# Patient Record
Sex: FEMALE | Race: WHITE | Employment: OTHER | URBAN - METROPOLITAN AREA
[De-identification: names, ages, dates, MRNs, and addresses within clinical notes are randomized per-mention and may not be internally consistent; named-entity substitution may affect disease eponyms.]

---

## 2017-01-16 DIAGNOSIS — E78.00 PURE HYPERCHOLESTEROLEMIA: ICD-10-CM

## 2017-01-16 DIAGNOSIS — K58.0 IRRITABLE BOWEL SYNDROME WITH DIARRHEA: ICD-10-CM

## 2017-01-16 DIAGNOSIS — E03.9 ACQUIRED HYPOTHYROIDISM: ICD-10-CM

## 2017-01-16 LAB
A/G RATIO: 2 (ref 1.1–2.2)
ALBUMIN SERPL-MCNC: 3.9 G/DL (ref 3.4–5)
ALP BLD-CCNC: 48 U/L (ref 40–129)
ALT SERPL-CCNC: 9 U/L (ref 10–40)
ANION GAP SERPL CALCULATED.3IONS-SCNC: 12 MMOL/L (ref 3–16)
AST SERPL-CCNC: 16 U/L (ref 15–37)
BASOPHILS ABSOLUTE: 0.1 K/UL (ref 0–0.2)
BASOPHILS RELATIVE PERCENT: 1 %
BILIRUB SERPL-MCNC: 0.4 MG/DL (ref 0–1)
BUN BLDV-MCNC: 16 MG/DL (ref 7–20)
CALCIUM SERPL-MCNC: 9.9 MG/DL (ref 8.3–10.6)
CHLORIDE BLD-SCNC: 101 MMOL/L (ref 99–110)
CHOLESTEROL, TOTAL: 206 MG/DL (ref 0–199)
CO2: 28 MMOL/L (ref 21–32)
CREAT SERPL-MCNC: 1.1 MG/DL (ref 0.6–1.2)
EOSINOPHILS ABSOLUTE: 0.2 K/UL (ref 0–0.6)
EOSINOPHILS RELATIVE PERCENT: 4.5 %
GFR AFRICAN AMERICAN: 59
GFR NON-AFRICAN AMERICAN: 48
GLOBULIN: 2 G/DL
GLUCOSE BLD-MCNC: 90 MG/DL (ref 70–99)
HCT VFR BLD CALC: 40.3 % (ref 36–48)
HDLC SERPL-MCNC: 72 MG/DL (ref 40–60)
HEMOGLOBIN: 13.3 G/DL (ref 12–16)
LDL CHOLESTEROL CALCULATED: 117 MG/DL
LYMPHOCYTES ABSOLUTE: 1.9 K/UL (ref 1–5.1)
LYMPHOCYTES RELATIVE PERCENT: 36.2 %
MCH RBC QN AUTO: 28.5 PG (ref 26–34)
MCHC RBC AUTO-ENTMCNC: 33 G/DL (ref 31–36)
MCV RBC AUTO: 86.4 FL (ref 80–100)
MONOCYTES ABSOLUTE: 0.7 K/UL (ref 0–1.3)
MONOCYTES RELATIVE PERCENT: 13.6 %
NEUTROPHILS ABSOLUTE: 2.4 K/UL (ref 1.7–7.7)
NEUTROPHILS RELATIVE PERCENT: 44.7 %
PDW BLD-RTO: 13.9 % (ref 12.4–15.4)
PLATELET # BLD: 243 K/UL (ref 135–450)
PMV BLD AUTO: 10 FL (ref 5–10.5)
POTASSIUM SERPL-SCNC: 5 MMOL/L (ref 3.5–5.1)
RBC # BLD: 4.67 M/UL (ref 4–5.2)
SODIUM BLD-SCNC: 141 MMOL/L (ref 136–145)
TOTAL PROTEIN: 5.9 G/DL (ref 6.4–8.2)
TRIGL SERPL-MCNC: 85 MG/DL (ref 0–150)
TSH REFLEX: 2.32 UIU/ML (ref 0.27–4.2)
VLDLC SERPL CALC-MCNC: 17 MG/DL
WBC # BLD: 5.4 K/UL (ref 4–11)

## 2017-01-24 ENCOUNTER — OFFICE VISIT (OUTPATIENT)
Dept: FAMILY MEDICINE CLINIC | Age: 77
End: 2017-01-24

## 2017-01-24 VITALS
SYSTOLIC BLOOD PRESSURE: 104 MMHG | OXYGEN SATURATION: 97 % | HEART RATE: 68 BPM | TEMPERATURE: 98.7 F | DIASTOLIC BLOOD PRESSURE: 70 MMHG | BODY MASS INDEX: 27.19 KG/M2 | RESPIRATION RATE: 18 BRPM | WEIGHT: 163.4 LBS

## 2017-01-24 DIAGNOSIS — N28.9 RENAL INSUFFICIENCY, MILD: ICD-10-CM

## 2017-01-24 DIAGNOSIS — F41.1 ANXIETY STATE: Primary | ICD-10-CM

## 2017-01-24 DIAGNOSIS — E03.9 ACQUIRED HYPOTHYROIDISM: ICD-10-CM

## 2017-01-24 DIAGNOSIS — E78.00 PURE HYPERCHOLESTEROLEMIA: ICD-10-CM

## 2017-01-24 PROCEDURE — 99214 OFFICE O/P EST MOD 30 MIN: CPT | Performed by: FAMILY MEDICINE

## 2017-01-24 RX ORDER — ESCITALOPRAM OXALATE 10 MG/1
10 TABLET ORAL DAILY
Qty: 90 TABLET | Refills: 1 | Status: SHIPPED | OUTPATIENT
Start: 2017-01-24 | End: 2017-05-22 | Stop reason: SDUPTHER

## 2017-02-24 ENCOUNTER — OFFICE VISIT (OUTPATIENT)
Dept: FAMILY MEDICINE CLINIC | Age: 77
End: 2017-02-24

## 2017-02-24 VITALS
TEMPERATURE: 97.3 F | OXYGEN SATURATION: 100 % | SYSTOLIC BLOOD PRESSURE: 120 MMHG | HEART RATE: 84 BPM | BODY MASS INDEX: 27.06 KG/M2 | DIASTOLIC BLOOD PRESSURE: 65 MMHG | WEIGHT: 162.6 LBS

## 2017-02-24 DIAGNOSIS — E78.00 PURE HYPERCHOLESTEROLEMIA: Primary | ICD-10-CM

## 2017-02-24 DIAGNOSIS — N28.9 RENAL INSUFFICIENCY, MILD: ICD-10-CM

## 2017-02-24 DIAGNOSIS — Z12.11 COLON CANCER SCREENING: ICD-10-CM

## 2017-02-24 DIAGNOSIS — F41.1 ANXIETY STATE: ICD-10-CM

## 2017-02-24 PROCEDURE — 99214 OFFICE O/P EST MOD 30 MIN: CPT | Performed by: FAMILY MEDICINE

## 2017-02-24 RX ORDER — FENOFIBRATE 134 MG/1
CAPSULE ORAL
Qty: 90 CAPSULE | Refills: 1
Start: 2017-02-24 | End: 2017-05-22 | Stop reason: SDUPTHER

## 2017-03-28 ENCOUNTER — TELEPHONE (OUTPATIENT)
Dept: FAMILY MEDICINE CLINIC | Age: 77
End: 2017-03-28

## 2017-04-21 ENCOUNTER — TELEPHONE (OUTPATIENT)
Dept: FAMILY MEDICINE CLINIC | Age: 77
End: 2017-04-21

## 2017-04-21 DIAGNOSIS — E78.00 PURE HYPERCHOLESTEROLEMIA: Primary | ICD-10-CM

## 2017-04-25 DIAGNOSIS — E78.00 PURE HYPERCHOLESTEROLEMIA: ICD-10-CM

## 2017-04-25 LAB
A/G RATIO: 2.3 (ref 1.1–2.2)
ALBUMIN SERPL-MCNC: 4.1 G/DL (ref 3.4–5)
ALP BLD-CCNC: 55 U/L (ref 40–129)
ALT SERPL-CCNC: 12 U/L (ref 10–40)
ANION GAP SERPL CALCULATED.3IONS-SCNC: 14 MMOL/L (ref 3–16)
AST SERPL-CCNC: 18 U/L (ref 15–37)
BILIRUB SERPL-MCNC: <0.2 MG/DL (ref 0–1)
BUN BLDV-MCNC: 17 MG/DL (ref 7–20)
CALCIUM SERPL-MCNC: 9.8 MG/DL (ref 8.3–10.6)
CHLORIDE BLD-SCNC: 103 MMOL/L (ref 99–110)
CHOLESTEROL, TOTAL: 205 MG/DL (ref 0–199)
CO2: 27 MMOL/L (ref 21–32)
CREAT SERPL-MCNC: 1.1 MG/DL (ref 0.6–1.2)
GFR AFRICAN AMERICAN: 59
GFR NON-AFRICAN AMERICAN: 48
GLOBULIN: 1.8 G/DL
GLUCOSE BLD-MCNC: 85 MG/DL (ref 70–99)
HDLC SERPL-MCNC: 75 MG/DL (ref 40–60)
LDL CHOLESTEROL CALCULATED: 117 MG/DL
POTASSIUM SERPL-SCNC: 5.2 MMOL/L (ref 3.5–5.1)
SODIUM BLD-SCNC: 144 MMOL/L (ref 136–145)
TOTAL PROTEIN: 5.9 G/DL (ref 6.4–8.2)
TRIGL SERPL-MCNC: 67 MG/DL (ref 0–150)
TSH REFLEX: 2.01 UIU/ML (ref 0.27–4.2)
VLDLC SERPL CALC-MCNC: 13 MG/DL

## 2017-04-28 DIAGNOSIS — F41.1 ANXIETY STATE: ICD-10-CM

## 2017-04-28 RX ORDER — LORAZEPAM 1 MG/1
TABLET ORAL
Qty: 90 TABLET | Refills: 0 | Status: SHIPPED | OUTPATIENT
Start: 2017-04-28 | End: 2017-05-22 | Stop reason: SDUPTHER

## 2017-05-19 DIAGNOSIS — F41.1 ANXIETY STATE: ICD-10-CM

## 2017-05-22 DIAGNOSIS — F41.1 ANXIETY STATE: ICD-10-CM

## 2017-05-22 RX ORDER — LORAZEPAM 1 MG/1
TABLET ORAL
Qty: 90 TABLET | Refills: 0 | Status: CANCELLED | OUTPATIENT
Start: 2017-05-22

## 2017-05-22 RX ORDER — ESCITALOPRAM OXALATE 10 MG/1
10 TABLET ORAL DAILY
Qty: 90 TABLET | Refills: 1 | Status: SHIPPED | OUTPATIENT
Start: 2017-05-22 | End: 2017-10-31 | Stop reason: SDUPTHER

## 2017-05-22 RX ORDER — FENOFIBRATE 134 MG/1
CAPSULE ORAL
Qty: 90 CAPSULE | Refills: 1 | Status: SHIPPED | OUTPATIENT
Start: 2017-05-22 | End: 2017-06-13

## 2017-05-22 RX ORDER — LORAZEPAM 1 MG/1
TABLET ORAL
Qty: 90 TABLET | Refills: 0 | Status: SHIPPED | OUTPATIENT
Start: 2017-05-22 | End: 2017-06-13 | Stop reason: SDUPTHER

## 2017-06-13 ENCOUNTER — OFFICE VISIT (OUTPATIENT)
Dept: FAMILY MEDICINE CLINIC | Age: 77
End: 2017-06-13

## 2017-06-13 VITALS
RESPIRATION RATE: 12 BRPM | OXYGEN SATURATION: 100 % | HEART RATE: 75 BPM | BODY MASS INDEX: 26.83 KG/M2 | SYSTOLIC BLOOD PRESSURE: 129 MMHG | WEIGHT: 161.2 LBS | TEMPERATURE: 97.1 F | DIASTOLIC BLOOD PRESSURE: 70 MMHG

## 2017-06-13 DIAGNOSIS — R35.0 URINARY FREQUENCY: ICD-10-CM

## 2017-06-13 DIAGNOSIS — F41.1 ANXIETY STATE: ICD-10-CM

## 2017-06-13 DIAGNOSIS — N20.0 KIDNEY STONE: ICD-10-CM

## 2017-06-13 DIAGNOSIS — E78.00 PURE HYPERCHOLESTEROLEMIA: Primary | ICD-10-CM

## 2017-06-13 LAB
BILIRUBIN, POC: NORMAL
BLOOD URINE, POC: NORMAL
CLARITY, POC: NORMAL
COLOR, POC: YELLOW
GLUCOSE URINE, POC: NORMAL
KETONES, POC: NORMAL
LEUKOCYTE EST, POC: NORMAL
NITRITE, POC: NORMAL
PH, POC: 8.5
PROTEIN, POC: NORMAL
SPECIFIC GRAVITY, POC: 1
UROBILINOGEN, POC: 0.2

## 2017-06-13 PROCEDURE — 99214 OFFICE O/P EST MOD 30 MIN: CPT | Performed by: FAMILY MEDICINE

## 2017-06-13 PROCEDURE — 81002 URINALYSIS NONAUTO W/O SCOPE: CPT | Performed by: FAMILY MEDICINE

## 2017-06-13 RX ORDER — LORAZEPAM 1 MG/1
TABLET ORAL
Qty: 90 TABLET | Refills: 5 | Status: SHIPPED | OUTPATIENT
Start: 2017-06-13 | End: 2017-10-31 | Stop reason: SDUPTHER

## 2017-06-13 RX ORDER — SIMVASTATIN 20 MG
20 TABLET ORAL EVERY EVENING
Qty: 30 TABLET | Refills: 3 | Status: SHIPPED | OUTPATIENT
Start: 2017-06-13 | End: 2017-07-21

## 2017-06-14 ENCOUNTER — TELEPHONE (OUTPATIENT)
Dept: FAMILY MEDICINE CLINIC | Age: 77
End: 2017-06-14

## 2017-06-15 LAB — URINE CULTURE, ROUTINE: NORMAL

## 2017-06-22 ENCOUNTER — TELEPHONE (OUTPATIENT)
Dept: FAMILY MEDICINE CLINIC | Age: 77
End: 2017-06-22

## 2017-06-26 RX ORDER — OMEPRAZOLE 20 MG/1
CAPSULE, DELAYED RELEASE ORAL
Qty: 90 CAPSULE | Refills: 3 | Status: SHIPPED | OUTPATIENT
Start: 2017-06-26 | End: 2018-08-30 | Stop reason: SDUPTHER

## 2017-07-16 ENCOUNTER — TELEPHONE (OUTPATIENT)
Dept: FAMILY MEDICINE CLINIC | Age: 77
End: 2017-07-16

## 2017-07-21 ENCOUNTER — OFFICE VISIT (OUTPATIENT)
Dept: FAMILY MEDICINE CLINIC | Age: 77
End: 2017-07-21

## 2017-07-21 VITALS
WEIGHT: 161.2 LBS | TEMPERATURE: 97.5 F | RESPIRATION RATE: 16 BRPM | BODY MASS INDEX: 26.83 KG/M2 | HEART RATE: 78 BPM | SYSTOLIC BLOOD PRESSURE: 136 MMHG | DIASTOLIC BLOOD PRESSURE: 75 MMHG

## 2017-07-21 DIAGNOSIS — E78.00 PURE HYPERCHOLESTEROLEMIA: ICD-10-CM

## 2017-07-21 DIAGNOSIS — R91.8 MASS OF LOWER LOBE OF RIGHT LUNG: ICD-10-CM

## 2017-07-21 DIAGNOSIS — R10.9 RIGHT FLANK PAIN: Primary | ICD-10-CM

## 2017-07-21 DIAGNOSIS — R07.89 CHEST WALL PAIN: ICD-10-CM

## 2017-07-21 LAB
BILIRUBIN, POC: NEGATIVE
BLOOD URINE, POC: ABNORMAL
CLARITY, POC: CLEAR
COLOR, POC: YELLOW
GLUCOSE URINE, POC: NEGATIVE
KETONES, POC: NEGATIVE
LEUKOCYTE EST, POC: ABNORMAL
NITRITE, POC: NEGATIVE
PH, POC: 7.5
PROTEIN, POC: NEGATIVE
SPECIFIC GRAVITY, POC: 1
UROBILINOGEN, POC: 0.2

## 2017-07-21 PROCEDURE — 81002 URINALYSIS NONAUTO W/O SCOPE: CPT | Performed by: FAMILY MEDICINE

## 2017-07-21 PROCEDURE — 99214 OFFICE O/P EST MOD 30 MIN: CPT | Performed by: FAMILY MEDICINE

## 2017-07-21 RX ORDER — FENOFIBRATE 134 MG/1
134 CAPSULE ORAL
Qty: 30 CAPSULE | Refills: 5 | Status: SHIPPED | OUTPATIENT
Start: 2017-07-21 | End: 2018-01-16 | Stop reason: SDUPTHER

## 2017-07-23 LAB — URINE CULTURE, ROUTINE: NORMAL

## 2017-07-31 ENCOUNTER — TELEPHONE (OUTPATIENT)
Dept: FAMILY MEDICINE CLINIC | Age: 77
End: 2017-07-31

## 2017-08-01 ENCOUNTER — TELEPHONE (OUTPATIENT)
Dept: FAMILY MEDICINE CLINIC | Age: 77
End: 2017-08-01

## 2017-08-02 ENCOUNTER — TELEPHONE (OUTPATIENT)
Dept: FAMILY MEDICINE CLINIC | Age: 77
End: 2017-08-02

## 2017-08-07 ENCOUNTER — OFFICE VISIT (OUTPATIENT)
Dept: FAMILY MEDICINE CLINIC | Age: 77
End: 2017-08-07

## 2017-08-07 VITALS
HEART RATE: 78 BPM | OXYGEN SATURATION: 96 % | DIASTOLIC BLOOD PRESSURE: 61 MMHG | HEIGHT: 63 IN | SYSTOLIC BLOOD PRESSURE: 120 MMHG | RESPIRATION RATE: 16 BRPM | WEIGHT: 157.6 LBS | BODY MASS INDEX: 27.93 KG/M2

## 2017-08-07 DIAGNOSIS — R91.8 LUNG NODULES: Primary | ICD-10-CM

## 2017-08-07 PROCEDURE — 99213 OFFICE O/P EST LOW 20 MIN: CPT | Performed by: FAMILY MEDICINE

## 2017-09-20 RX ORDER — PROMETHAZINE HYDROCHLORIDE 12.5 MG/1
TABLET ORAL
Qty: 30 TABLET | Refills: 0 | Status: SHIPPED | OUTPATIENT
Start: 2017-09-20 | End: 2018-04-30 | Stop reason: SDUPTHER

## 2017-10-12 ENCOUNTER — TELEPHONE (OUTPATIENT)
Dept: FAMILY MEDICINE CLINIC | Age: 77
End: 2017-10-12

## 2017-10-12 ENCOUNTER — OFFICE VISIT (OUTPATIENT)
Dept: FAMILY MEDICINE CLINIC | Age: 77
End: 2017-10-12

## 2017-10-12 VITALS
DIASTOLIC BLOOD PRESSURE: 78 MMHG | WEIGHT: 158 LBS | SYSTOLIC BLOOD PRESSURE: 134 MMHG | BODY MASS INDEX: 27.65 KG/M2 | OXYGEN SATURATION: 100 % | HEART RATE: 70 BPM

## 2017-10-12 DIAGNOSIS — E03.9 ACQUIRED HYPOTHYROIDISM: ICD-10-CM

## 2017-10-12 DIAGNOSIS — R30.0 DYSURIA: ICD-10-CM

## 2017-10-12 DIAGNOSIS — R42 VERTIGO: Primary | ICD-10-CM

## 2017-10-12 PROCEDURE — 99214 OFFICE O/P EST MOD 30 MIN: CPT | Performed by: FAMILY MEDICINE

## 2017-10-12 RX ORDER — LEVOTHYROXINE SODIUM 0.07 MG/1
TABLET ORAL
Qty: 90 TABLET | Refills: 0 | Status: CANCELLED | OUTPATIENT
Start: 2017-10-12

## 2017-10-12 ASSESSMENT — PATIENT HEALTH QUESTIONNAIRE - PHQ9
1. LITTLE INTEREST OR PLEASURE IN DOING THINGS: 0
SUM OF ALL RESPONSES TO PHQ9 QUESTIONS 1 & 2: 1
2. FEELING DOWN, DEPRESSED OR HOPELESS: 1
SUM OF ALL RESPONSES TO PHQ QUESTIONS 1-9: 1

## 2017-10-12 NOTE — PROGRESS NOTES
Subjective:      Patient ID: Urszula Mac is a 78 y.o. female. HPI  Malina Durand is here for evaluation for dizziness. She complains of vertigo x 2 days. Occurs when lies down or gets up from lying down. Lasts a few seconds. Does not occur while up. No nausea, hearing loss or tinnitus. Has mild nasal congestion, sneezing and itchy eyes. FLonase and Zaditor are effective for this. She saw Dr. Willow Moss for her kidney stone. It is not obstructive. He wants to see her in a hear. She notes mild suprapubic pain on - this is chronic and unchanged. Feels a mild ache in the pelvis that lasts just a few seconds. Has had urine cx for this several times that were negative. Outpatient Prescriptions Marked as Taking for the 10/12/17 encounter (Office Visit) with Rosenda Bartlett MD   Medication Sig Dispense Refill    promethazine (PHENERGAN) 12.5 MG tablet TAKE 1 TABLET BY MOUTH EVERY 6 HOURS AS NEEDED FOR NAUSEA 30 tablet 0    levothyroxine (SYNTHROID) 75 MCG tablet TAKE 1 TABLET BY MOUTH EVERY DAY 90 tablet 0    fenofibrate micronized (LOFIBRA) 134 MG capsule Take 1 capsule by mouth every morning (before breakfast) 30 capsule 5    LORazepam (ATIVAN) 1 MG tablet TAKE 1 TABLET BY MOUTH 3 TIMES DAILY 90 tablet 5    escitalopram (LEXAPRO) 10 MG tablet Take 1 tablet by mouth daily TAKE 1 TABLET BY MOUTH DAILY 90 tablet 1    BIOTIN 5000 PO Take  by mouth.  vitamin D (CHOLECALCIFEROL) 1000 UNIT TABS tablet Take 1,000 Units by mouth 3 times daily.  Coenzyme Q10-Fish Oil-Vit E (CO-Q 10 OMEGA-3 FISH OIL) CAPS Take 1 capsule by mouth daily.  Magnesium 100 MG CAPS Take 1 tablet by mouth daily.  B Complex Vitamins (VITAMIN B COMPLEX PO) Take  by mouth.  Capsicum, Cayenne, (CAYENNE PEPPER PO) Take  by mouth.  Nutritional Supplements (JUICE PLUS FIBRE PO) Take 1 tablet by mouth daily.  Calcium & Magnesium Carbonates 311-232 MG CAPS Take 1 tablet by mouth 2 times daily.       Cranberry 500 MG CAPS Take 2 tablets by mouth daily. PMH, PSH, SH, Problem list reviewed. Social History   Substance Use Topics    Smoking status: Never Smoker    Smokeless tobacco: Never Used    Alcohol use No      Allergies   Allergen Reactions    Biaxin [Clarithromycin]     Erythromycin     Lescol     Lipitor     Macrodantin [Nitrofurantoin]     Paxil [Paroxetine]     Zoloft [Sertraline Hcl] Nausea Only and Other (See Comments)     C/o nausea and headaches     Bactrim [Sulfamethoxazole-Trimethoprim] Rash    Biaxin [Clarithromycin] Nausea Only    Ciprofloxacin Rash     Pain under arms.  Erythromycin Nausea Only      Review of Systems    Objective:   Physical Exam  Mood and affect are anxious. Skin is warm and dry. Well hydrated, no rash. PERRLA EOMI  CN intact.   + hallpike left. Ear exam - bilateral normal, TM intact without perforation or effusion, external canal normal. No significant ceruminosis noted. Nasal exam; septum midline, no deformities, nares patent, normal mucosa without swelling, no polyps, no bleeding. Pharynx normal, no oral lesions, dentition in good repair. No cervical, supraclavicular or submandibular LNS. Chest is clear, no wheezing or rales. Normal symmetric air entry throughout both lung fields. Heart regular with normal rate, no murmer or gallop  The abdomen is soft without tenderness, guarding, mass, rebound or organomegaly. Bowel sounds are normal. No CVA tenderness or inguinal adenopathy noted. Gait is normal  Assessment:      1. Vertigo  External Referral To Physical Therapy  Advised caution to prevent falls. Avoid driving with vertigo   2. Acquired hypothyroidism  levothyroxine refill   3. Dysuria  POCT Urinalysis no Micro - unable to provide spec. Has been ok in the past; will monitor          Plan:      Call or return to clinic prn if these symptoms worsen or fail to improve as anticipated.

## 2017-10-12 NOTE — TELEPHONE ENCOUNTER
Specialist name:Dr. Nati Alexis    Date of appointment: 10/13/17        Reason for referral:Vertigo    Diagnosis: Tomi Ma; 780.4    Insurance Name: [place] [address], [facility] [address] ID#: [id number]    Insurance Group #      Procedure Code:     Specialist IUZ#:[id number]    Specialist Tax VK#[id number]    Call back number is [phone]  Fax number: [phone]          Referrals require 7-10 business days notice for completion. It is the patient's responsibility to ensure the provider is in-network with their insurance company. If an insurance referral is required, authorization must be complete before the appointment or patient may be responsible for the bill.

## 2017-10-12 NOTE — TELEPHONE ENCOUNTER
This is a Referral, which does not go to the PA department. I am re-routing this to the correct inbox. Thank you!

## 2017-10-31 ENCOUNTER — OFFICE VISIT (OUTPATIENT)
Dept: FAMILY MEDICINE CLINIC | Age: 77
End: 2017-10-31

## 2017-10-31 VITALS
TEMPERATURE: 96.5 F | WEIGHT: 158 LBS | RESPIRATION RATE: 16 BRPM | DIASTOLIC BLOOD PRESSURE: 55 MMHG | BODY MASS INDEX: 27.65 KG/M2 | SYSTOLIC BLOOD PRESSURE: 118 MMHG | OXYGEN SATURATION: 99 % | HEART RATE: 71 BPM

## 2017-10-31 DIAGNOSIS — R42 VERTIGO: ICD-10-CM

## 2017-10-31 DIAGNOSIS — F41.1 ANXIETY STATE: ICD-10-CM

## 2017-10-31 DIAGNOSIS — N28.9 RENAL INSUFFICIENCY, MILD: ICD-10-CM

## 2017-10-31 DIAGNOSIS — E03.9 ACQUIRED HYPOTHYROIDISM: ICD-10-CM

## 2017-10-31 DIAGNOSIS — E78.00 PURE HYPERCHOLESTEROLEMIA: Primary | ICD-10-CM

## 2017-10-31 PROCEDURE — 1090F PRES/ABSN URINE INCON ASSESS: CPT | Performed by: FAMILY MEDICINE

## 2017-10-31 PROCEDURE — 4040F PNEUMOC VAC/ADMIN/RCVD: CPT | Performed by: FAMILY MEDICINE

## 2017-10-31 PROCEDURE — G8417 CALC BMI ABV UP PARAM F/U: HCPCS | Performed by: FAMILY MEDICINE

## 2017-10-31 PROCEDURE — 99214 OFFICE O/P EST MOD 30 MIN: CPT | Performed by: FAMILY MEDICINE

## 2017-10-31 PROCEDURE — 3017F COLORECTAL CA SCREEN DOC REV: CPT | Performed by: FAMILY MEDICINE

## 2017-10-31 PROCEDURE — 1036F TOBACCO NON-USER: CPT | Performed by: FAMILY MEDICINE

## 2017-10-31 PROCEDURE — G8399 PT W/DXA RESULTS DOCUMENT: HCPCS | Performed by: FAMILY MEDICINE

## 2017-10-31 PROCEDURE — G8427 DOCREV CUR MEDS BY ELIG CLIN: HCPCS | Performed by: FAMILY MEDICINE

## 2017-10-31 PROCEDURE — G8484 FLU IMMUNIZE NO ADMIN: HCPCS | Performed by: FAMILY MEDICINE

## 2017-10-31 PROCEDURE — 1123F ACP DISCUSS/DSCN MKR DOCD: CPT | Performed by: FAMILY MEDICINE

## 2017-10-31 RX ORDER — LORAZEPAM 1 MG/1
TABLET ORAL
Qty: 90 TABLET | Refills: 5 | Status: SHIPPED | OUTPATIENT
Start: 2017-10-31 | End: 2018-05-15 | Stop reason: SDUPTHER

## 2017-10-31 RX ORDER — ESCITALOPRAM OXALATE 10 MG/1
10 TABLET ORAL DAILY
Qty: 90 TABLET | Refills: 1 | Status: SHIPPED | OUTPATIENT
Start: 2017-10-31 | End: 2018-02-26 | Stop reason: SDUPTHER

## 2017-10-31 RX ORDER — ASCORBIC ACID 500 MG
500 TABLET ORAL DAILY
COMMUNITY
End: 2022-08-10

## 2017-10-31 NOTE — PROGRESS NOTES
90 capsule 3    LORazepam (ATIVAN) 1 MG tablet TAKE 1 TABLET BY MOUTH 3 TIMES DAILY 90 tablet 5    escitalopram (LEXAPRO) 10 MG tablet Take 1 tablet by mouth daily TAKE 1 TABLET BY MOUTH DAILY 90 tablet 1    vitamin D (CHOLECALCIFEROL) 1000 UNIT TABS tablet Take 1,000 Units by mouth 3 times daily.  Coenzyme Q10-Fish Oil-Vit E (CO-Q 10 OMEGA-3 FISH OIL) CAPS Take 1 capsule by mouth daily.  Magnesium 100 MG CAPS Take 1 tablet by mouth daily.  B Complex Vitamins (VITAMIN B COMPLEX PO) Take  by mouth.  Capsicum, Cayenne, (CAYENNE PEPPER PO) Take  by mouth.  Nutritional Supplements (JUICE PLUS FIBRE PO) Take 1 tablet by mouth daily.  Calcium & Magnesium Carbonates 311-232 MG CAPS Take 1 tablet by mouth 2 times daily.  Cranberry 500 MG CAPS Take 2 tablets by mouth daily. Patient Active Problem List   Diagnosis    Esophageal reflux    Anxiety state    Pure hypercholesterolemia    Hypothyroidism    Hydronephrosis    IBS (irritable bowel syndrome)    Seborrheic keratosis    TMJ arthralgia    Pruritic disorder    Lumbar spinal stenosis    Patellofemoral arthritis    Anal pruritus    Perennial allergic rhinitis    Eczema    Gastrocnemius muscle rupture    Renal insufficiency, mild    Kidney stone    Lung nodules      PMH, PSH, SH, Problem list reviewed. Social History   Substance Use Topics    Smoking status: Never Smoker    Smokeless tobacco: Never Used    Alcohol use No      Allergies   Allergen Reactions    Biaxin [Clarithromycin]     Erythromycin     Lescol     Lipitor     Macrodantin [Nitrofurantoin]     Paxil [Paroxetine]     Zoloft [Sertraline Hcl] Nausea Only and Other (See Comments)     C/o nausea and headaches     Bactrim [Sulfamethoxazole-Trimethoprim] Rash    Biaxin [Clarithromycin] Nausea Only    Ciprofloxacin Rash     Pain under arms.     Erythromycin Nausea Only        Review of Systems    Objective:   Physical Exam  NAD  Skin is warm and dry. Mood and affect are mildly anxious. No agitation or psychomotor retardation. No pressured speech, grandiosity or tangential thoughts. Insight and judgement are intact. Not suicidal.   PERRLA  EOMI   The neck is supple and free of adenopathy or masses, the thyroid is normal without enlargement or nodules. Chest is clear, no wheezing or rales. Normal symmetric air entry throughout both lung fields. Heart regular with normal rate, no murmer or gallop    The abdomen is soft without tenderness, guarding, mass, rebound or organomegaly. Bowel sounds are normal. No CVA tenderness or inguinal adenopathy noted. Gait is normal.   Assessment:      1. Pure hypercholesterolemia  Comprehensive Metabolic Panel    Lipid Panel  Tolerating fenofibrate; intolerant statins. Diet and exercise emphasized in decrease CV risk    2. Anxiety state  escitalopram (LEXAPRO) 10 MG tablet    LORazepam (ATIVAN) 1 MG tablet  Ativan risks reviewed; she does not drink alcohol. No pharmacologic strategies addressed. Controlled Substances Monitoring:     Documentation: Possible medication side effects, risk of tolerance and/or dependence, and alternative treatments discussed., No signs of potential drug abuse or diversion identified. Mari Taylor MD)  Chronic Pain: Dose reduction has been attempted., Functional status reviewed - continues with improved or maintaining ADL's. Mari Taylor MD)    3. Acquired hypothyroidism  TSH with Reflex   4. Renal insufficiency, mild  CBC  Continue to avoid NSAIDS and maintain hydration    5. Vertigo  Resolved after PT          Plan:      Side effects of current medications reviewed and questions answered. Follow up in 3 months or prn. Laura Coulter

## 2017-12-06 ENCOUNTER — TELEPHONE (OUTPATIENT)
Dept: FAMILY MEDICINE CLINIC | Age: 77
End: 2017-12-06

## 2017-12-07 DIAGNOSIS — E03.9 ACQUIRED HYPOTHYROIDISM: ICD-10-CM

## 2017-12-07 DIAGNOSIS — E78.00 PURE HYPERCHOLESTEROLEMIA: ICD-10-CM

## 2017-12-07 LAB
A/G RATIO: 2.3 (ref 1.1–2.2)
ALBUMIN SERPL-MCNC: 4.3 G/DL (ref 3.4–5)
ALP BLD-CCNC: 42 U/L (ref 40–129)
ALT SERPL-CCNC: 12 U/L (ref 10–40)
ANION GAP SERPL CALCULATED.3IONS-SCNC: 13 MMOL/L (ref 3–16)
AST SERPL-CCNC: 19 U/L (ref 15–37)
BILIRUB SERPL-MCNC: 0.5 MG/DL (ref 0–1)
BUN BLDV-MCNC: 17 MG/DL (ref 7–20)
CALCIUM SERPL-MCNC: 9.8 MG/DL (ref 8.3–10.6)
CHLORIDE BLD-SCNC: 102 MMOL/L (ref 99–110)
CHOLESTEROL, TOTAL: 193 MG/DL (ref 0–199)
CO2: 27 MMOL/L (ref 21–32)
CREAT SERPL-MCNC: 1 MG/DL (ref 0.6–1.2)
GFR AFRICAN AMERICAN: >60
GFR NON-AFRICAN AMERICAN: 54
GLOBULIN: 1.9 G/DL
GLUCOSE BLD-MCNC: 82 MG/DL (ref 70–99)
HCT VFR BLD CALC: 40.6 % (ref 36–48)
HDLC SERPL-MCNC: 69 MG/DL (ref 40–60)
HEMOGLOBIN: 13.1 G/DL (ref 12–16)
LDL CHOLESTEROL CALCULATED: 105 MG/DL
MCH RBC QN AUTO: 28.7 PG (ref 26–34)
MCHC RBC AUTO-ENTMCNC: 32.2 G/DL (ref 31–36)
MCV RBC AUTO: 89.1 FL (ref 80–100)
PDW BLD-RTO: 15.4 % (ref 12.4–15.4)
PLATELET # BLD: 256 K/UL (ref 135–450)
PMV BLD AUTO: 10.4 FL (ref 5–10.5)
POTASSIUM SERPL-SCNC: 4.3 MMOL/L (ref 3.5–5.1)
RBC # BLD: 4.56 M/UL (ref 4–5.2)
SODIUM BLD-SCNC: 142 MMOL/L (ref 136–145)
TOTAL PROTEIN: 6.2 G/DL (ref 6.4–8.2)
TRIGL SERPL-MCNC: 94 MG/DL (ref 0–150)
TSH REFLEX: 2.2 UIU/ML (ref 0.27–4.2)
VLDLC SERPL CALC-MCNC: 19 MG/DL
WBC # BLD: 6.1 K/UL (ref 4–11)

## 2017-12-12 ENCOUNTER — OFFICE VISIT (OUTPATIENT)
Dept: FAMILY MEDICINE CLINIC | Age: 77
End: 2017-12-12

## 2017-12-12 VITALS
DIASTOLIC BLOOD PRESSURE: 63 MMHG | TEMPERATURE: 97.8 F | WEIGHT: 157 LBS | HEART RATE: 70 BPM | OXYGEN SATURATION: 99 % | SYSTOLIC BLOOD PRESSURE: 114 MMHG | BODY MASS INDEX: 25.23 KG/M2 | RESPIRATION RATE: 20 BRPM | HEIGHT: 66 IN

## 2017-12-12 DIAGNOSIS — N28.9 RENAL INSUFFICIENCY, MILD: Primary | ICD-10-CM

## 2017-12-12 DIAGNOSIS — Z12.39 BREAST CANCER SCREENING: ICD-10-CM

## 2017-12-12 DIAGNOSIS — F41.1 ANXIETY STATE: ICD-10-CM

## 2017-12-12 DIAGNOSIS — E78.00 PURE HYPERCHOLESTEROLEMIA: ICD-10-CM

## 2017-12-12 DIAGNOSIS — R91.8 LUNG NODULES: ICD-10-CM

## 2017-12-12 DIAGNOSIS — Z86.010 HISTORY OF COLON POLYPS: ICD-10-CM

## 2017-12-12 PROCEDURE — 3017F COLORECTAL CA SCREEN DOC REV: CPT | Performed by: FAMILY MEDICINE

## 2017-12-12 PROCEDURE — 1123F ACP DISCUSS/DSCN MKR DOCD: CPT | Performed by: FAMILY MEDICINE

## 2017-12-12 PROCEDURE — 1036F TOBACCO NON-USER: CPT | Performed by: FAMILY MEDICINE

## 2017-12-12 PROCEDURE — 99214 OFFICE O/P EST MOD 30 MIN: CPT | Performed by: FAMILY MEDICINE

## 2017-12-12 PROCEDURE — 1090F PRES/ABSN URINE INCON ASSESS: CPT | Performed by: FAMILY MEDICINE

## 2017-12-12 PROCEDURE — 4040F PNEUMOC VAC/ADMIN/RCVD: CPT | Performed by: FAMILY MEDICINE

## 2017-12-12 PROCEDURE — G8427 DOCREV CUR MEDS BY ELIG CLIN: HCPCS | Performed by: FAMILY MEDICINE

## 2017-12-12 PROCEDURE — G8399 PT W/DXA RESULTS DOCUMENT: HCPCS | Performed by: FAMILY MEDICINE

## 2017-12-12 PROCEDURE — G8484 FLU IMMUNIZE NO ADMIN: HCPCS | Performed by: FAMILY MEDICINE

## 2017-12-12 PROCEDURE — G8417 CALC BMI ABV UP PARAM F/U: HCPCS | Performed by: FAMILY MEDICINE

## 2017-12-13 NOTE — PROGRESS NOTES
Subjective:      Patient ID: Gwen Mandel is a 78 y.o. female. HPI  Ronnell Montano is here for follow up on hyperlipidemia, anxiety, renal insufficiency   She would like to discuss mammogram and colonoscopy. Hyperlipidemia:  No new myalgias or GI upset on fenofibrate (Tricor, Trilipix). Medication compliance: compliant all of the time. Patient is  following a low fat, low cholesterol diet. She is not exercising regularly. She drinks a lot of water and avoids NSAIDS>   Patient denies any exertional chest pain, dyspnea, palpitations, syncope, orthopnea, edema or paroxysmal nocturnal dyspnea. The patient denies any symptoms of neurological impairment or TIA's; no amaurosis, diplopia, dysphasia, or unilateral disturbance of motor or sensory function. No loss of balance or vertigo. Anxiety is increased at the holidays. Overwhelmed. Does not feel depressed. Is trying to use mindfulness and strategies she learned at therapy but admits she often forgets to use these. Uses Ativan regularly and feels it is important to her quality of life. No abuse or misuse. Keeps medication secure. Had a mammogram one year ago. Wonders if she should continue with mammograms. She feels she would treat if cancer sound. She is overdue follow up for colonoscopy. Is concerned about the prep. Had a poly on colonoscopy in 2009. The patient denies abdominal or flank pain, anorexia, nausea or vomiting, dysphagia, change in bowel habits or black or bloody stools or weight loss.      Lab Results   Component Value Date    CHOL 193 12/07/2017    TRIG 94 12/07/2017    HDL 69 (H) 12/07/2017    LDLCALC 105 (H) 12/07/2017     Lab Results   Component Value Date    ALT 12 12/07/2017    AST 19 12/07/2017           Outpatient Prescriptions Marked as Taking for the 12/12/17 encounter (Office Visit) with Juris Fothergill, MD   Medication Sig Dispense Refill    vitamin C (ASCORBIC ACID) 500 MG tablet Take 500 mg by mouth daily      escitalopram (LEXAPRO) 10 MG tablet Take 1 tablet by mouth daily TAKE 1 TABLET BY MOUTH DAILY 90 tablet 1    LORazepam (ATIVAN) 1 MG tablet TAKE 1 TABLET BY MOUTH 3 TIMES DAILY 90 tablet 5    promethazine (PHENERGAN) 12.5 MG tablet TAKE 1 TABLET BY MOUTH EVERY 6 HOURS AS NEEDED FOR NAUSEA 30 tablet 0    levothyroxine (SYNTHROID) 75 MCG tablet TAKE 1 TABLET BY MOUTH EVERY DAY 90 tablet 0    fenofibrate micronized (LOFIBRA) 134 MG capsule Take 1 capsule by mouth every morning (before breakfast) 30 capsule 5    omeprazole (PRILOSEC) 20 MG delayed release capsule TAKE 1 CAPSULE BY MOUTH DAILY 90 capsule 3    vitamin D (CHOLECALCIFEROL) 1000 UNIT TABS tablet Take 1,000 Units by mouth 3 times daily.  Coenzyme Q10-Fish Oil-Vit E (CO-Q 10 OMEGA-3 FISH OIL) CAPS Take 1 capsule by mouth daily.  Magnesium 100 MG CAPS Take 1 tablet by mouth daily.  B Complex Vitamins (VITAMIN B COMPLEX PO) Take  by mouth.  Capsicum, Cayenne, (CAYENNE PEPPER PO) Take  by mouth.  Nutritional Supplements (JUICE PLUS FIBRE PO) Take 1 tablet by mouth daily.  Calcium & Magnesium Carbonates 311-232 MG CAPS Take 1 tablet by mouth 2 times daily.  Cranberry 500 MG CAPS Take 2 tablets by mouth daily. Patient Active Problem List   Diagnosis    Esophageal reflux    Anxiety state    Pure hypercholesterolemia    Hypothyroidism    Hydronephrosis    IBS (irritable bowel syndrome)    Seborrheic keratosis    TMJ arthralgia    Pruritic disorder    Lumbar spinal stenosis    Patellofemoral arthritis    Anal pruritus    Perennial allergic rhinitis    Eczema    Gastrocnemius muscle rupture    Renal insufficiency, mild    Kidney stone    Lung nodules      PMH, PSH, SH, Problem list reviewed. .  Social History     Social History    Marital status:      Spouse name: N/A    Number of children: N/A    Years of education: N/A     Occupational History    Not on file.      Social History Main

## 2018-01-16 DIAGNOSIS — E78.00 PURE HYPERCHOLESTEROLEMIA: ICD-10-CM

## 2018-01-16 RX ORDER — FENOFIBRATE 134 MG/1
134 CAPSULE ORAL
Qty: 30 CAPSULE | Refills: 5 | Status: SHIPPED | OUTPATIENT
Start: 2018-01-16 | End: 2018-07-09 | Stop reason: SDUPTHER

## 2018-02-06 ENCOUNTER — TELEPHONE (OUTPATIENT)
Dept: FAMILY MEDICINE CLINIC | Age: 78
End: 2018-02-06

## 2018-02-06 DIAGNOSIS — R91.8 LUNG NODULES: Primary | ICD-10-CM

## 2018-02-12 ENCOUNTER — TELEPHONE (OUTPATIENT)
Dept: FAMILY MEDICINE CLINIC | Age: 78
End: 2018-02-12

## 2018-02-12 NOTE — TELEPHONE ENCOUNTER
Patient called regarded encounter on 2/6/18. Patient wanted to verify the lesion that was seen on last ct and asking if she could wait until next week to schedule her f/u ct lung.  Advised patient that she could call and schedule her ct for next and that it was advised for her to get a f/u ct on her lung to make her there was no changes from what was seen back in aug

## 2018-02-23 ENCOUNTER — HOSPITAL ENCOUNTER (OUTPATIENT)
Dept: CT IMAGING | Age: 78
Discharge: HOME | End: 2018-02-23
Attending: FAMILY MEDICINE | Admitting: FAMILY MEDICINE

## 2018-02-23 DIAGNOSIS — R91.8 OTHER NONSPECIFIC ABNORMAL FINDING OF LUNG FIELD (CODE): ICD-10-CM

## 2018-02-23 DIAGNOSIS — R91.8 LUNG NODULES: ICD-10-CM

## 2018-02-26 ENCOUNTER — OFFICE VISIT (OUTPATIENT)
Dept: FAMILY MEDICINE CLINIC | Age: 78
End: 2018-02-26

## 2018-02-26 VITALS
DIASTOLIC BLOOD PRESSURE: 72 MMHG | HEART RATE: 70 BPM | TEMPERATURE: 96.8 F | WEIGHT: 157 LBS | RESPIRATION RATE: 12 BRPM | SYSTOLIC BLOOD PRESSURE: 124 MMHG | OXYGEN SATURATION: 100 % | BODY MASS INDEX: 25.34 KG/M2

## 2018-02-26 DIAGNOSIS — R10.31 RIGHT LOWER QUADRANT ABDOMINAL PAIN: ICD-10-CM

## 2018-02-26 DIAGNOSIS — G25.0 BENIGN HEAD TREMOR: ICD-10-CM

## 2018-02-26 DIAGNOSIS — N20.0 KIDNEY STONE: ICD-10-CM

## 2018-02-26 DIAGNOSIS — R91.8 PULMONARY NODULES: Primary | ICD-10-CM

## 2018-02-26 LAB
BILIRUBIN, POC: NEGATIVE
BLOOD URINE, POC: NEGATIVE
CLARITY, POC: CLEAR
COLOR, POC: YELLOW
GLUCOSE URINE, POC: NEGATIVE
KETONES, POC: NEGATIVE
LEUKOCYTE EST, POC: NORMAL
NITRITE, POC: NEGATIVE
PH, POC: 7.5
PROTEIN, POC: NORMAL
SPECIFIC GRAVITY, POC: 1
UROBILINOGEN, POC: 0.2

## 2018-02-26 PROCEDURE — 81002 URINALYSIS NONAUTO W/O SCOPE: CPT | Performed by: FAMILY MEDICINE

## 2018-02-26 PROCEDURE — 99214 OFFICE O/P EST MOD 30 MIN: CPT | Performed by: FAMILY MEDICINE

## 2018-02-26 RX ORDER — ESCITALOPRAM OXALATE 10 MG/1
10 TABLET ORAL DAILY
Qty: 90 TABLET | Refills: 1 | Status: SHIPPED | OUTPATIENT
Start: 2018-02-26 | End: 2018-09-14 | Stop reason: SDUPTHER

## 2018-02-26 NOTE — PROGRESS NOTES
2 times daily.  Cranberry 500 MG CAPS Take 2 tablets by mouth daily. Patient Active Problem List   Diagnosis    Esophageal reflux    Anxiety state    Pure hypercholesterolemia    Hypothyroidism    Hydronephrosis    IBS (irritable bowel syndrome)    Seborrheic keratosis    TMJ arthralgia    Pruritic disorder    Lumbar spinal stenosis    Patellofemoral arthritis    Anal pruritus    Perennial allergic rhinitis    Eczema    Gastrocnemius muscle rupture    Renal insufficiency, mild    Kidney stone    Lung nodules      PMH, PSH, SH, Problem list reviewed. Social History   Substance Use Topics    Smoking status: Never Smoker    Smokeless tobacco: Never Used    Alcohol use No     Allergies   Allergen Reactions    Biaxin [Clarithromycin]     Erythromycin     Lescol     Lipitor     Macrodantin [Nitrofurantoin]     Paxil [Paroxetine]     Zoloft [Sertraline Hcl] Nausea Only and Other (See Comments)     C/o nausea and headaches     Bactrim [Sulfamethoxazole-Trimethoprim] Rash    Biaxin [Clarithromycin] Nausea Only    Ciprofloxacin Rash     Pain under arms.  Erythromycin Nausea Only      . CT chest 2/23/18  1. Numerous bilateral parenchymal nodules majority 1-3 mm in size   as well as several larger nodules 4 mm. Some of the lower lobe   nodules were noted on prior CT 3/12 unchanged and therefore benign   in view of stable appearance. Following the Fleischner Society   2017 guidelines for multiple solid nodules <6 mm, if patient is   low risk no routine follow-up is suggested.  If patient is high   risk, then optional CT at 12 months is suggested.  qzxv       Remainder exam unremarkable. Review of Systems    Objective:   Physical Exam  NAD  Skin is warm and dry. Well hydrated, no rash. Mild tremor head; high frequency, low amplitude. No extremity tremor. The neck is supple and free of adenopathy or masses, the thyroid is normal without enlargement or nodules.   Chest is

## 2018-02-28 LAB — URINE CULTURE, ROUTINE: NORMAL

## 2018-05-01 RX ORDER — PROMETHAZINE HYDROCHLORIDE 12.5 MG/1
TABLET ORAL
Qty: 30 TABLET | Refills: 0 | Status: SHIPPED | OUTPATIENT
Start: 2018-05-01 | End: 2019-10-07

## 2018-05-15 ENCOUNTER — OFFICE VISIT (OUTPATIENT)
Dept: FAMILY MEDICINE CLINIC | Age: 78
End: 2018-05-15

## 2018-05-15 VITALS
OXYGEN SATURATION: 99 % | HEART RATE: 73 BPM | WEIGHT: 156.4 LBS | SYSTOLIC BLOOD PRESSURE: 135 MMHG | BODY MASS INDEX: 25.24 KG/M2 | DIASTOLIC BLOOD PRESSURE: 69 MMHG | RESPIRATION RATE: 12 BRPM | TEMPERATURE: 97.9 F

## 2018-05-15 DIAGNOSIS — K64.8 INTERNAL HEMORRHOID: ICD-10-CM

## 2018-05-15 DIAGNOSIS — F41.1 ANXIETY STATE: ICD-10-CM

## 2018-05-15 DIAGNOSIS — R42 VERTIGO: Primary | ICD-10-CM

## 2018-05-15 DIAGNOSIS — R30.0 DYSURIA: ICD-10-CM

## 2018-05-15 DIAGNOSIS — Z12.31 ENCOUNTER FOR SCREENING MAMMOGRAM FOR BREAST CANCER: ICD-10-CM

## 2018-05-15 LAB
BILIRUBIN, POC: NEGATIVE
BLOOD URINE, POC: NEGATIVE
CLARITY, POC: NORMAL
COLOR, POC: NORMAL
GLUCOSE URINE, POC: NEGATIVE
KETONES, POC: NEGATIVE
LEUKOCYTE EST, POC: NORMAL
NITRITE, POC: NEGATIVE
PH, POC: 8.5
PROTEIN, POC: NEGATIVE
SPECIFIC GRAVITY, POC: 1.02
UROBILINOGEN, POC: 0.2

## 2018-05-15 PROCEDURE — 81002 URINALYSIS NONAUTO W/O SCOPE: CPT | Performed by: FAMILY MEDICINE

## 2018-05-15 PROCEDURE — 99214 OFFICE O/P EST MOD 30 MIN: CPT | Performed by: FAMILY MEDICINE

## 2018-05-15 PROCEDURE — 1111F DSCHRG MED/CURRENT MED MERGE: CPT | Performed by: FAMILY MEDICINE

## 2018-05-15 RX ORDER — LORAZEPAM 1 MG/1
TABLET ORAL
Qty: 90 TABLET | Refills: 5 | Status: SHIPPED | OUTPATIENT
Start: 2018-05-15 | End: 2018-10-12 | Stop reason: SDUPTHER

## 2018-05-18 LAB
ORGANISM: ABNORMAL
URINE CULTURE, ROUTINE: ABNORMAL
URINE CULTURE, ROUTINE: ABNORMAL

## 2018-05-18 RX ORDER — CEPHALEXIN 500 MG/1
500 CAPSULE ORAL 3 TIMES DAILY
Qty: 15 CAPSULE | Refills: 0 | Status: SHIPPED | OUTPATIENT
Start: 2018-05-18 | End: 2018-05-23

## 2018-07-09 DIAGNOSIS — E78.00 PURE HYPERCHOLESTEROLEMIA: ICD-10-CM

## 2018-07-09 RX ORDER — FENOFIBRATE 134 MG/1
134 CAPSULE ORAL
Qty: 30 CAPSULE | Refills: 5 | Status: SHIPPED | OUTPATIENT
Start: 2018-07-09 | End: 2019-01-13 | Stop reason: SDUPTHER

## 2018-07-13 ENCOUNTER — HOSPITAL ENCOUNTER (EMERGENCY)
Dept: EMERGENCY DEPT | Age: 78
Discharge: ANOTHER HOSPITAL | End: 2018-07-14
Attending: EMERGENCY MEDICINE
Payer: MEDICARE

## 2018-07-13 VITALS
WEIGHT: 145 LBS | HEIGHT: 67 IN | TEMPERATURE: 97.4 F | OXYGEN SATURATION: 100 % | DIASTOLIC BLOOD PRESSURE: 70 MMHG | HEART RATE: 58 BPM | RESPIRATION RATE: 26 BRPM | SYSTOLIC BLOOD PRESSURE: 134 MMHG | BODY MASS INDEX: 22.76 KG/M2

## 2018-07-13 DIAGNOSIS — S27.321A CONTUSION OF RIGHT LUNG, INITIAL ENCOUNTER: ICD-10-CM

## 2018-07-13 DIAGNOSIS — J94.2 HEMOPNEUMOTHORAX ON RIGHT: ICD-10-CM

## 2018-07-13 DIAGNOSIS — S22.41XA CLOSED FRACTURE OF MULTIPLE RIBS OF RIGHT SIDE, INITIAL ENCOUNTER: ICD-10-CM
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
## 2018-07-13 LAB
APTT: 23.6 SEC (ref 26–36)
BASOPHILS ABSOLUTE: 0 K/UL (ref 0–0.2)
BASOPHILS RELATIVE PERCENT: 0.5 %
CALCIUM IONIZED: 1.22 MMOL/L (ref 1.12–1.32)
CO2: 30 MMOL/L (ref 21–32)
EOSINOPHILS ABSOLUTE: 0.1 K/UL (ref 0–0.6)
EOSINOPHILS RELATIVE PERCENT: 1.6 %
GFR AFRICAN AMERICAN: 58
GFR NON-AFRICAN AMERICAN: 48
GLUCOSE BLD-MCNC: 104 MG/DL (ref 70–99)
HCT VFR BLD CALC: 38.5 % (ref 36–48)
HEMOGLOBIN: 12.7 G/DL (ref 12–16)
INR BLD: 0.99 (ref 0.86–1.14)
LYMPHOCYTES ABSOLUTE: 2 K/UL (ref 1–5.1)
LYMPHOCYTES RELATIVE PERCENT: 23.4 %
MCH RBC QN AUTO: 28.3 PG (ref 26–34)
MCHC RBC AUTO-ENTMCNC: 32.9 G/DL (ref 31–36)
MCV RBC AUTO: 86 FL (ref 80–100)
MONOCYTES ABSOLUTE: 0.8 K/UL (ref 0–1.3)
MONOCYTES RELATIVE PERCENT: 9.5 %
NEUTROPHILS ABSOLUTE: 5.5 K/UL (ref 1.7–7.7)
NEUTROPHILS RELATIVE PERCENT: 65 %
PDW BLD-RTO: 14.9 % (ref 12.4–15.4)
PERFORMED ON: ABNORMAL
PLATELET # BLD: 258 K/UL (ref 135–450)
PMV BLD AUTO: 9.7 FL (ref 5–10.5)
POC ANION GAP: 10 (ref 10–20)
POC BUN: 26 MG/DL (ref 7–18)
POC CHLORIDE: 101 MMOL/L (ref 99–110)
POC CREATININE: 1.1 MG/DL (ref 0.6–1.2)
POC POTASSIUM: 4.5 MMOL/L (ref 3.5–5.1)
POC SAMPLE TYPE: ABNORMAL
POC SODIUM: 141 MMOL/L (ref 136–145)
PROTHROMBIN TIME: 11.3 SEC (ref 9.8–13)
RBC # BLD: 4.47 M/UL (ref 4–5.2)
WBC # BLD: 8.5 K/UL (ref 4–11)

## 2018-07-13 PROCEDURE — 85730 THROMBOPLASTIN TIME PARTIAL: CPT

## 2018-07-13 PROCEDURE — 71046 X-RAY EXAM CHEST 2 VIEWS: CPT

## 2018-07-13 PROCEDURE — 80047 BASIC METABLC PNL IONIZED CA: CPT

## 2018-07-13 PROCEDURE — 96374 THER/PROPH/DIAG INJ IV PUSH: CPT

## 2018-07-13 PROCEDURE — 86900 BLOOD TYPING SEROLOGIC ABO: CPT

## 2018-07-13 PROCEDURE — 90715 TDAP VACCINE 7 YRS/> IM: CPT

## 2018-07-13 PROCEDURE — 90471 IMMUNIZATION ADMIN: CPT

## 2018-07-13 PROCEDURE — 94664 DEMO&/EVAL PT USE INHALER: CPT

## 2018-07-13 PROCEDURE — 74177 CT ABD & PELVIS W/CONTRAST: CPT

## 2018-07-13 PROCEDURE — 99284 EMERGENCY DEPT VISIT MOD MDM: CPT

## 2018-07-13 PROCEDURE — 94150 VITAL CAPACITY TEST: CPT

## 2018-07-13 PROCEDURE — 9990 CHARGE CONVERSION

## 2018-07-13 PROCEDURE — 94761 N-INVAS EAR/PLS OXIMETRY MLT: CPT

## 2018-07-13 PROCEDURE — 71260 CT THORAX DX C+: CPT

## 2018-07-13 PROCEDURE — 73090 X-RAY EXAM OF FOREARM: CPT

## 2018-07-13 PROCEDURE — 96375 TX/PRO/DX INJ NEW DRUG ADDON: CPT

## 2018-07-13 PROCEDURE — 86901 BLOOD TYPING SEROLOGIC RH(D): CPT

## 2018-07-13 PROCEDURE — 86850 RBC ANTIBODY SCREEN: CPT

## 2018-07-13 PROCEDURE — 85025 COMPLETE CBC W/AUTO DIFF WBC: CPT

## 2018-07-13 PROCEDURE — 72125 CT NECK SPINE W/O DYE: CPT

## 2018-07-13 PROCEDURE — 85610 PROTHROMBIN TIME: CPT

## 2018-07-13 RX ORDER — ONDANSETRON 2 MG/ML
4 INJECTION INTRAMUSCULAR; INTRAVENOUS ONCE
Status: COMPLETED | OUTPATIENT
Start: 2018-07-13 | End: 2018-07-13

## 2018-07-13 RX ORDER — BACITRACIN ZINC AND POLYMYXIN B SULFATE 500; 1000 [USP'U]/G; [USP'U]/G
OINTMENT TOPICAL ONCE
Status: COMPLETED | OUTPATIENT
Start: 2018-07-13 | End: 2018-07-13

## 2018-07-13 RX ORDER — FENTANYL CITRATE 50 UG/ML
50 INJECTION, SOLUTION INTRAMUSCULAR; INTRAVENOUS ONCE
Status: COMPLETED | OUTPATIENT
Start: 2018-07-13 | End: 2018-07-13

## 2018-07-13 RX ORDER — HYDROCODONE BITARTRATE AND ACETAMINOPHEN 5; 325 MG/1; MG/1
1 TABLET ORAL ONCE
Status: COMPLETED | OUTPATIENT
Start: 2018-07-13 | End: 2018-07-13

## 2018-07-13 RX ADMIN — BACITRACIN ZINC AND POLYMYXIN B SULFATE: 500; 1000 OINTMENT TOPICAL at 21:59

## 2018-07-13 RX ADMIN — ONDANSETRON 4 MG: 2 INJECTION INTRAMUSCULAR; INTRAVENOUS at 21:17

## 2018-07-13 RX ADMIN — HYDROCODONE BITARTRATE AND ACETAMINOPHEN 1 TABLET: 5; 325 TABLET ORAL at 21:59

## 2018-07-13 RX ADMIN — FENTANYL CITRATE 50 MCG: 50 INJECTION, SOLUTION INTRAMUSCULAR; INTRAVENOUS at 21:18

## 2018-07-13 ASSESSMENT — ENCOUNTER SYMPTOMS
VOMITING: 0
RESPIRATORY NEGATIVE: 1
SHORTNESS OF BREATH: 0
NAUSEA: 0
BACK PAIN: 0
GASTROINTESTINAL NEGATIVE: 1
ABDOMINAL PAIN: 0

## 2018-07-13 ASSESSMENT — PAIN DESCRIPTION - DESCRIPTORS: DESCRIPTORS: ACHING

## 2018-07-13 ASSESSMENT — PAIN DESCRIPTION - LOCATION: LOCATION: BACK;CHEST

## 2018-07-13 ASSESSMENT — PAIN SCALES - GENERAL
PAINLEVEL_OUTOF10: 10

## 2018-07-13 ASSESSMENT — PAIN DESCRIPTION - FREQUENCY: FREQUENCY: CONTINUOUS

## 2018-07-13 ASSESSMENT — PAIN DESCRIPTION - PAIN TYPE: TYPE: ACUTE PAIN

## 2018-07-14 LAB
ABO/RH: NORMAL
ANTIBODY SCREEN: NORMAL
BACTERIA: ABNORMAL /HPF
BILIRUBIN URINE: NEGATIVE
BILIRUBIN URINE: NEGATIVE MG/DL
BLOOD, URINE: NEGATIVE
BLOOD, URINE: NEGATIVE
CLARITY: ABNORMAL
CLARITY: CLEAR
COLOR: ABNORMAL
COLOR: YELLOW
EPITHELIAL CELLS, UA: ABNORMAL /HPF
GLUCOSE URINE: NEGATIVE MG/DL
GLUCOSE URINE: NEGATIVE MG/DL
KETONES, URINE: NEGATIVE MG/DL
KETONES, URINE: NEGATIVE MG/DL
LEUKOCYTE ESTERASE, URINE: ABNORMAL
LEUKOCYTE ESTERASE, URINE: ABNORMAL
MICROSCOPIC EXAMINATION: YES
MICROSCOPIC EXAMINATION: YES
NITRITE, URINE: NEGATIVE
NITRITE, URINE: NEGATIVE
PH UA: 8.5
PH UA: 8.5
PROTEIN UA: ABNORMAL MG/DL
PROTEIN UA: ABNORMAL MG/DL
RBC UA: ABNORMAL /HPF (ref 0–2)
RENAL EPITHELIAL, UA: ABNORMAL /HPF
SPECIFIC GRAVITY UA: 1.01
SPECIFIC GRAVITY UA: 1.01
URINE REFLEX TO CULTURE: YES
URINE TYPE: ABNORMAL
UROBILINOGEN, URINE: 0.2 E.U./DL
UROBILINOGEN, URINE: 0.2 E.U./DL
WBC UA: ABNORMAL /HPF (ref 0–5)

## 2018-07-14 PROCEDURE — 87086 URINE CULTURE/COLONY COUNT: CPT

## 2018-07-14 PROCEDURE — 81001 URINALYSIS AUTO W/SCOPE: CPT

## 2018-07-14 NOTE — ED PROVIDER NOTES
never used smokeless tobacco. She reports that she does not drink alcohol or use drugs. Medications     Discharge Medication List as of 7/14/2018  6:24 AM      CONTINUE these medications which have NOT CHANGED    Details   fenofibrate micronized (LOFIBRA) 134 MG capsule TAKE 1 CAPSULE BY MOUTH EVERY MORNING (BEFORE BREAKFAST), Disp-30 capsule, R-5Normal      LORazepam (ATIVAN) 1 MG tablet TAKE 1 TABLET BY MOUTH 3 TIMES DAILY. , Disp-90 tablet, R-5Normal      promethazine (PHENERGAN) 12.5 MG tablet TAKE 1 TABLET BY MOUTH EVERY 6 HOURS AS NEEDED FOR NAUSEA, Disp-30 tablet, R-0Normal      escitalopram (LEXAPRO) 10 MG tablet Take 1 tablet by mouth daily TAKE 1 TABLET BY MOUTH DAILY, Disp-90 tablet, R-1Normal      levothyroxine (SYNTHROID) 75 MCG tablet TAKE 1 TABLET BY MOUTH EVERY DAY, Disp-90 tablet, R-3Normal      vitamin C (ASCORBIC ACID) 500 MG tablet Take 500 mg by mouth dailyHistorical Med      omeprazole (PRILOSEC) 20 MG delayed release capsule TAKE 1 CAPSULE BY MOUTH DAILY, Disp-90 capsule, R-3Normal      vitamin D (CHOLECALCIFEROL) 1000 UNIT TABS tablet Take 2,000 Units by mouth daily Historical Med      Coenzyme Q10-Fish Oil-Vit E (CO-Q 10 OMEGA-3 FISH OIL) CAPS Take 1 capsule by mouth daily. Magnesium 100 MG CAPS Take 1 tablet by mouth daily. Capsicum, Cayenne, (CAYENNE PEPPER PO) Take  by mouth. Nutritional Supplements (JUICE PLUS FIBRE PO) Take 1 tablet by mouth daily. Calcium & Magnesium Carbonates 311-232 MG CAPS Take 1 tablet by mouth 2 times daily. Cranberry 500 MG CAPS Take 2 tablets by mouth daily. Allergies     She is allergic to biaxin [clarithromycin]; erythromycin; lescol; lipitor; macrodantin [nitrofurantoin]; paxil [paroxetine]; zoloft [sertraline hcl]; bactrim [sulfamethoxazole-trimethoprim]; biaxin [clarithromycin]; ciprofloxacin; and erythromycin.     Physical Exam     INITIAL VITALS: BP: (!) 116/54, Temp: 97.4 °F (36.3 °C), Pulse: 52, Resp: 16, SpO2: 100 %   Physical Exam   Constitutional: She is oriented to person, place, and time. She appears well-developed and well-nourished. No distress. HENT:   Head: Normocephalic and atraumatic. Right Ear: External ear normal.   Left Ear: External ear normal.   Mouth/Throat: Oropharynx is clear and moist.   Eyes: Conjunctivae and EOM are normal. Pupils are equal, round, and reactive to light. Neck: Normal range of motion. Neck supple. No midline tenderness. Cardiovascular: Normal rate, regular rhythm, normal heart sounds and intact distal pulses. Pulmonary/Chest: Effort normal and breath sounds normal. No respiratory distress. Tenderness to the right chest wall anteriorly and posteriorly. No open wounds. No ecchymosis. No crepitus. Breath sounds auscultated bilaterally. Abdominal: Soft. She exhibits no distension. There is no tenderness. Musculoskeletal: Normal range of motion. No cervical, thoracic, lumbar midline tenderness. Negative logroll bilaterally. Extensive skin tears to the right forearm without bony tenderness. Neurological: She is alert and oriented to person, place, and time. No cranial nerve deficit. She exhibits normal muscle tone. Coordination normal.   Skin: Skin is warm and dry. She is not diaphoretic. Psychiatric: She has a normal mood and affect. Her behavior is normal. Judgment and thought content normal.   Nursing note and vitals reviewed. Diagnostic Results       RADIOLOGY:  CT CHEST W CONTRAST   Final Result      1. Right-sided hemopneumothorax, pleural air measures 1 cm in AP dimension. 2.  Small contusion involving the anterior basilar segment of the right lower lobe. 3.  Displaced right lateral sixth through ninth rib fractures, with the fracture ninth rib displaced into the pleura. 4.  No acute CT abnormality in the abdomen and pelvis.          Electronically signed by:  Mars Brunson M.D.    7/14/2018 12:22:00 AM      CT ABDOMEN PELVIS W IV CONTRAST

## 2018-07-14 NOTE — ED NOTES
Pt was walking to [place] and slipped on wood floor and fell on a dressed and trash can. Pt denies losing consciousness Pt does not use an ambulatory aid. Pt this may have hit head. Pt has full range on motion of neck. Pt states fell on right side and has a skin tear on right arm. Pt complains is sob because of pain form fall.       Chun Horn RN  07/13/18 [id number]

## 2018-07-16 LAB — URINE CULTURE, ROUTINE: NORMAL

## 2018-07-24 DIAGNOSIS — L30.9 ECZEMA, UNSPECIFIED TYPE: ICD-10-CM

## 2018-07-24 RX ORDER — MOMETASONE FUROATE 1 MG/G
CREAM TOPICAL DAILY
Qty: 1 TUBE | Refills: 3 | Status: SHIPPED | OUTPATIENT
Start: 2018-07-24 | End: 2019-02-11 | Stop reason: ALTCHOICE

## 2018-08-07 ENCOUNTER — TELEPHONE (OUTPATIENT)
Dept: PHARMACY | Facility: CLINIC | Age: 78
End: 2018-08-07
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
## 2018-08-07 PROCEDURE — 1111F DSCHRG MED/CURRENT MED MERGE: CPT

## 2018-08-07 RX ORDER — ACETAMINOPHEN 325 MG/1
975 TABLET ORAL EVERY 8 HOURS PRN
COMMUNITY

## 2018-08-07 RX ORDER — OXYCODONE HYDROCHLORIDE 5 MG/1
5 TABLET ORAL EVERY 6 HOURS PRN
COMMUNITY
End: 2018-09-14 | Stop reason: ALTCHOICE

## 2018-08-07 RX ORDER — BACITRACIN ZINC AND POLYMYXIN B SULFATE 500; 1000 [USP'U]/G; [USP'U]/G
OINTMENT TOPICAL 2 TIMES DAILY PRN
COMMUNITY
End: 2018-10-12 | Stop reason: ALTCHOICE

## 2018-08-07 NOTE — TELEPHONE ENCOUNTER
CLINICAL PHARMACY NOTE  Post-Discharge Transitions of Care (HEIDI)    Non-face-to-face services provided:  Assessment and support for treatment adherence and medication management-Medication Reconciliation    Subjective/Objective:  Earlene Dunbar is a 70 y.o. female. Patient was discharged from [facility] on 7/19/18 with a diagnosis of Fall. Patient outreach to review discharge medications and provide medication review and management. Spoke with patient    Allergies   Allergen Reactions    Biaxin [Clarithromycin]     Erythromycin     Lescol     Lipitor     Macrodantin [Nitrofurantoin]     Paxil [Paroxetine]     Zoloft [Sertraline Hcl] Nausea Only and Other (See Comments)     C/o nausea and headaches     Bactrim [Sulfamethoxazole-Trimethoprim] Rash    Biaxin [Clarithromycin] Nausea Only    Ciprofloxacin Rash     Pain under arms.  Erythromycin Nausea Only       Discharge Medications (as per discharging medication list found in Care Everywhere):  Medication Sig Comments    acetaminophen (TYLENOL) 325 MG tablet Take 975 mg by mouth every 8 hours as needed for Pain Added per patient and UC list.    bacitracin-polymyxin b (POLYSPORIN) 500-20390 UNIT/GM ointment Apply topically 2 times daily as needed (skin tear on forearm)  Added per patient and UC list.    oxyCODONE (ROXICODONE) 5 MG immediate release tablet Take 5 mg by mouth every 6 hours as needed for Pain. . Added per patient and UC list. Patient states she only has a few pills left and has been splitting them in half.  mometasone (ELOCON) 0.1 % cream Apply topically daily Apply topically daily. (Patient taking differently: Apply topically daily as needed (ezcema) ) Updated per patient. States she uses as needed for ezcema.     fenofibrate micronized (LOFIBRA) 134 MG capsule TAKE 1 CAPSULE BY MOUTH EVERY MORNING (BEFORE BREAKFAST) Taking as prescribed      LORazepam (ATIVAN) 1 MG tablet TAKE 1 TABLET BY MOUTH 3 TIMES DAILY. (Patient taking differently: Take 1 mg by mouth every 8 hours as needed for Anxiety. Costa Jaramillo ) Updated per patient and UC List. Patient takes approximately two times per day.  promethazine (PHENERGAN) 12.5 MG tablet TAKE 1 TABLET BY MOUTH EVERY 6 HOURS AS NEEDED FOR NAUSEA Not on UC list. Patient states she has if she needs it, but has not needed for a while.  escitalopram (LEXAPRO) 10 MG tablet Take 1 tablet by mouth daily TAKE 1 TABLET BY MOUTH DAILY Taking as prescribed        levothyroxine (SYNTHROID) 75 MCG tablet TAKE 1 TABLET BY MOUTH EVERY DAY Taking as prescribed      vitamin C (ASCORBIC ACID) 500 MG tablet Take 500 mg by mouth daily Not on UC list. Patient unable to confirm dose. States she is in pain and cannot go to her bottles. Asked her to bring in bottles to upcoming office visit. She replied \"my doctor knows what I am on\".  omeprazole (PRILOSEC) 20 MG delayed release capsule TAKE 1 CAPSULE BY MOUTH DAILY Taking as prescribed      vitamin D (CHOLECALCIFEROL) 1000 UNIT TABS tablet Take 2,000 Units by mouth daily  Not on UC list. Patient unable to confirm dose. States she is in pain and cannot go to her bottles. Asked her to bring in bottles to upcoming office visit. She replied \"my doctor knows what I am on\".  Coenzyme Q10-Fish Oil-Vit E (CO-Q 10 OMEGA-3 FISH OIL) CAPS Take 1 capsule by mouth daily. Not on UC list. Patient unable to confirm dose. States she is in pain and cannot go to her bottles. Asked her to bring in bottles to upcoming office visit. She replied \"my doctor knows what I am on\".  Nutritional Supplements (JUICE PLUS FIBRE PO) Take 1 tablet by mouth daily. Not on UC list. Patient unable to confirm dose. States she is in pain and cannot go to her bottles. Asked her to bring in bottles to upcoming office visit. She replied \"my doctor knows what I am on\".  Cranberry 500 MG CAPS Take 2 tablets by mouth daily.  Not on UC list. Patient unable to confirm dose. States she is in pain and cannot go to her bottles. Asked her to bring in bottles to upcoming office visit. She replied \"my doctor knows what I am on\".  Magnesium 100 MG CAPS Take 1 tablet by mouth daily. Not on UC list. Patient unable to confirm dose. States she is in pain and cannot go to her bottles. Asked her to bring in bottles to upcoming office visit. She replied \"my doctor knows what I am on\".  Capsicum, Cayenne, (CAYENNE PEPPER PO) Take  by mouth. Not on UC list. Patient unable to confirm dose. States she is in pain and cannot go to her bottles. Asked her to bring in bottles to upcoming office visit. She replied \"my doctor knows what I am on\".  Calcium & Magnesium Carbonates 311-232 MG CAPS Take 1 tablet by mouth 2 times daily. Not on UC list. Patient unable to confirm dose. States she is in pain and cannot go to her bottles. Asked her to bring in bottles to upcoming office visit. She replied \"my doctor knows what I am on\". Patient states she is no longer taking senna-docusate as prescribed at discharge. Denies issues with constipation while on opioid. Meds to Beds:NA    Estimated Creatinine Clearance: 42 mL/min (based on SCr of 1.1 mg/dL). Assessment/Plan:  - Medication reconciliation completed. Number of medications reviewed: 18    - Pt is taking medications as directed by discharging physician.    Number of discrepancies: 0.     - [facility] active medication list updated:  · Medications Added (3):  Tylenol, Polysporin, Oxycodone  · Medications Removed (0):    · Medications Changed (2):  Ativan, Elocon    - Identified Potential Medication Interactions: No clinically significant interactions identified via Lexicomp Interaction Analysis as category D or higher.    - Renal Dosing: No renal adjustments necessary.    - Follow up appointment date (7 days for more severe illness, 14 days for others):    · Patient encouraged to schedule follow up with PCP - she said she would

## 2018-08-23 PROBLEM — J94.2 HEMOTHORAX ON RIGHT: Status: ACTIVE | Noted: 2018-08-23

## 2018-08-23 PROBLEM — S22.41XD CLOSED FRACTURE OF MULTIPLE RIBS OF RIGHT SIDE WITH ROUTINE HEALING: Status: ACTIVE | Noted: 2018-08-23
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
## 2018-08-23 NOTE — PROGRESS NOTES
needed (ezcema) ) 1 Tube 3    fenofibrate micronized (LOFIBRA) 134 MG capsule TAKE 1 CAPSULE BY MOUTH EVERY MORNING (BEFORE BREAKFAST) 30 capsule 5    LORazepam (ATIVAN) 1 MG tablet TAKE 1 TABLET BY MOUTH 3 TIMES DAILY. (Patient taking differently: Take 1 mg by mouth every 8 hours as needed for Anxiety. Costa Clint ) 90 tablet 5    promethazine (PHENERGAN) 12.5 MG tablet TAKE 1 TABLET BY MOUTH EVERY 6 HOURS AS NEEDED FOR NAUSEA 30 tablet 0    escitalopram (LEXAPRO) 10 MG tablet Take 1 tablet by mouth daily TAKE 1 TABLET BY MOUTH DAILY 90 tablet 1    levothyroxine (SYNTHROID) 75 MCG tablet TAKE 1 TABLET BY MOUTH EVERY DAY 90 tablet 3    vitamin C (ASCORBIC ACID) 500 MG tablet Take 500 mg by mouth daily      omeprazole (PRILOSEC) 20 MG delayed release capsule TAKE 1 CAPSULE BY MOUTH DAILY 90 capsule 3    vitamin D (CHOLECALCIFEROL) 1000 UNIT TABS tablet Take 2,000 Units by mouth daily       Coenzyme Q10-Fish Oil-Vit E (CO-Q 10 OMEGA-3 FISH OIL) CAPS Take 1 capsule by mouth daily.  Magnesium 100 MG CAPS Take 1 tablet by mouth daily.  Capsicum, Cayenne, (CAYENNE PEPPER PO) Take  by mouth.  Nutritional Supplements (JUICE PLUS FIBRE PO) Take 1 tablet by mouth daily.  Calcium & Magnesium Carbonates 311-232 MG CAPS Take 1 tablet by mouth 2 times daily.  Cranberry 500 MG CAPS Take 2 tablets by mouth daily. Allergies   Allergen Reactions    Biaxin [Clarithromycin]     Erythromycin     Lescol     Lipitor     Macrodantin [Nitrofurantoin]     Paxil [Paroxetine]     Zoloft [Sertraline Hcl] Nausea Only and Other (See Comments)     C/o nausea and headaches     Bactrim [Sulfamethoxazole-Trimethoprim] Rash    Biaxin [Clarithromycin] Nausea Only    Ciprofloxacin Rash     Pain under arms.     Erythromycin Nausea Only       Patient Active Problem List   Diagnosis    Esophageal reflux    Anxiety state    Pure hypercholesterolemia    Hypothyroidism    Hydronephrosis    IBS (irritable bowel syndrome)    Seborrheic keratosis    TMJ arthralgia    Pruritic disorder    Lumbar spinal stenosis    Patellofemoral arthritis    Anal pruritus    Perennial allergic rhinitis    Eczema    Gastrocnemius muscle rupture    Renal insufficiency, mild    Kidney stone    Lung nodules    Benign head tremor    Closed fracture of multiple ribs of right side with routine healing    Hemothorax on right    Fall       Past Medical History:   Diagnosis Date    Diastolic dysfunction 0/24/6597    Falls 07/13/2018    Postmenopausal atrophic vaginitis 11/14/2011    Shingles 9/18/2014       Past Surgical History:   Procedure Laterality Date    TONSILLECTOMY          Family History   Problem Relation Age of Onset    Thyroid Cancer Daughter 52       Social History   Substance Use Topics    Smoking status: Never Smoker    Smokeless tobacco: Never Used    Alcohol use No            Review of Systems  Review of Systems    Objective:   Physical Exam  Vitals:    08/24/18 1451   BP: 124/63   Pulse: 84   Resp: 10   Temp: 97.9 °F (36.6 °C)   TempSrc: Oral   SpO2: 97%   Weight: 150 lb 9.6 oz (68.3 kg)   Height: 5' 3\" (1.6 m)       Physical Exam  NAD  Skin is warm and dry. Right forearm skin tears healing well; no induration or discharge. No bony tenderness. Mood and affect are mildly anxious. No agitation or psychomotor retardation. No pressured speech, grandiosity or tangential thoughts. Insight and judgement are intact. Not suicidal.   PERRLA  EOMI  Fundi are grossly normal.  CN intact. The neck is supple and free of adenopathy or masses, the thyroid is normal without enlargement or nodules. Chest is clear, no wheezing or rales. Normal symmetric air entry throughout both lung fields. Mild right lateral chest wall tenderness; no deformity. Heart regular with normal rate, no murmer or gallop   The abdomen is soft without tenderness, guarding, mass, rebound or organomegaly.  Bowel sounds are normal.  Aorta,

## 2018-08-24 ENCOUNTER — TELEPHONE (OUTPATIENT)
Dept: FAMILY MEDICINE CLINIC | Age: 78
End: 2018-08-24

## 2018-08-24 ENCOUNTER — OFFICE VISIT (OUTPATIENT)
Dept: FAMILY MEDICINE CLINIC | Age: 78
End: 2018-08-24

## 2018-08-24 VITALS
TEMPERATURE: 97.9 F | DIASTOLIC BLOOD PRESSURE: 63 MMHG | OXYGEN SATURATION: 97 % | HEIGHT: 63 IN | BODY MASS INDEX: 26.68 KG/M2 | RESPIRATION RATE: 10 BRPM | HEART RATE: 84 BPM | SYSTOLIC BLOOD PRESSURE: 124 MMHG | WEIGHT: 150.6 LBS
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
## 2018-08-24 DIAGNOSIS — S41.111S SKIN TEAR OF RIGHT UPPER ARM WITHOUT COMPLICATION, SEQUELA: ICD-10-CM

## 2018-08-24 DIAGNOSIS — Z91.81 AT HIGH RISK FOR FALLS: ICD-10-CM

## 2018-08-24 DIAGNOSIS — H53.8 BLURRED VISION: ICD-10-CM

## 2018-08-24 DIAGNOSIS — J94.2 HEMOPNEUMOTHORAX ON RIGHT: ICD-10-CM

## 2018-08-24 DIAGNOSIS — R35.0 URINARY FREQUENCY: ICD-10-CM

## 2018-08-24 DIAGNOSIS — S22.41XD CLOSED FRACTURE OF MULTIPLE RIBS OF RIGHT SIDE WITH ROUTINE HEALING: Primary | ICD-10-CM

## 2018-08-24 DIAGNOSIS — F41.1 ANXIETY STATE: ICD-10-CM

## 2018-08-24 LAB
BILIRUBIN, POC: NORMAL
BLOOD URINE, POC: NORMAL
CLARITY, POC: NORMAL
COLOR, POC: YELLOW
GLUCOSE URINE, POC: NORMAL
KETONES, POC: NORMAL
LEUKOCYTE EST, POC: NORMAL
NITRITE, POC: NORMAL
PH, POC: 7.5
PROTEIN, POC: NORMAL
SPECIFIC GRAVITY, POC: 1.01
UROBILINOGEN, POC: 0.2

## 2018-08-24 PROCEDURE — 81002 URINALYSIS NONAUTO W/O SCOPE: CPT | Performed by: FAMILY MEDICINE

## 2018-08-24 PROCEDURE — 99214 OFFICE O/P EST MOD 30 MIN: CPT | Performed by: FAMILY MEDICINE

## 2018-08-24 NOTE — LETTER
MEDICATION AGREEMENT     Manish Riley  [birth year]      For certain conditions, multiple classes of medications may be used to help better manage your symptoms, and to improve your ability to function at home, work and in social settings. However, these medications do have risks, which will be discussed with you, including addiction and dependency. The following prescribed medications need frequent monitoring and will require you to partner and assist in your healthcare. Medication  Dose, instructions and quantity as indicated on current prescription bottle Diagnosis/Reason(s) for Taking Category     Ativan 1 mg Anxiety  Benzodiazepine. Benefits and goals of Controlled Substance Medications: There are two potential goals for your treatment: (1) decreased pain and suffering (2) improved daily life functions. There are many possible treatments for your chronic condition(s), and, in addition to controlled substance medications, we will try alternatives such as physical therapy, yoga, massage, home daily exercise, meditation, relaxation techniques, injections, chiropractic manipulations, surgery, cognitive therapy, hypnosis and many medications that are not habit-forming. Use of controlled substance medications may be helpful, but they are unlikely to resolve all of your symptoms or restore all function. Risks of Controlled Substance Medications:    Opioid pain medications: These medications can lead to problems such as addiction/dependence, sedation, lightheadedness/dizziness, memory issues, falls, constipation, nausea, or vomiting. They may also impair the ability to drive or operate machinery. Additionally, these medications may lower testosterone levels, leading to loss of bone strength, stamina and sex drive.   They may cause problems with breathing, sleep apnea and reduced coughing, which are especially dangerous for patients with lung disease. Overdose or dangerous interactions with alcohol and other medications may occur, leading to death. Hyperalgesia may develop, in which patients receiving opioids for the treatment of pain may actually become more sensitive to certain painful stimuli, and in some cases, experience pain from ordinarily non-painful stimuli. Women between the ages of 14-53 who could become pregnant should carefully weigh the risks and benefits of opioids with their physicians, as these medications increase the risk of pregnancy complications, including miscarriage,  delivery and stillbirth. It is also possible for babies to be born addicted to opioids. Opioid dependence withdrawal symptoms may include; feelings of uneasiness, increased pain, irritability, belly pain, diarrhea, sweats and goose-flesh. Benzodiazepines and non-benzodiazepine sleep medications: These medications can lead to problems such as addiction/dependence, sedation, fatigue, lightheadedness, dizziness, incoordination, falls, depression, hallucinations, and impaired judgment, memory and concentration. The ability to drive and operate machinery may also be affected. Abnormal sleep-related behaviors have been reported, including sleep walking, driving, making telephone calls, eating, or having sex while not fully awake. These medications can suppress breathing and worsen sleep apnea, particularly when combined with alcohol or other sedating medications, potentially leading to death. Dependence withdrawal symptoms may include tremors, anxiety, hallucinations and seizures. Stimulants:  Common adverse effects include addiction/dependence, increased blood pressure and heart rate, decreased appetite, nausea, involuntary weight loss, insomnia, irritability, and headaches.   These risks may increase when these medications are combined with other stimulants, such as caffeine pills or energy drinks, certain weight loss assessments recommended by my provider. · I will actively participate in any program designed to improve function, including social, physical, psychological and daily or work activities. 2. I will not use illegal or street drugs or another person's prescription. If I have an addiction problem with drugs or alcohol and my provider asks me to enter a program to address this issue, I agree to follow through. Such programs may include:  · 12-Step program and securing a sponsor  · Individual counseling   · Inpatient or outpatient treatment  · Other:_____________________________________________________________________________________________________________________________________________    If in treatment, I will request that a copy of the programs initial evaluation and treatment recommendations be sent to this provider and will not expect refills until that is received. I will also request written monthly updates be sent to this provider to verify my continuing treatment. 3. I will consent to drug screening upon my providers request to assure I am only taking the prescribed drugs, described in this MEDICATION AGREEMENT. I understand that a drug screen is a laboratory test in which a sample of my urine, blood or saliva is checked to see what drugs I have been taking. 4. I agree that I will treat the providers and staff at this office with respect at all times. I will keep all of my scheduled appointments, but if I need to cancel my appointment, I will do so a minimum of 24 hours before it is scheduled. 5. I understand that this provider may stop prescribing the medications listed if:  · I do not show any improvement in pain, or my activity has not improved. · I develop rapid tolerance or loss of improvement, as described in my treatment plan. · I develop significant side effects from the medication.   · My behavior is inconsistent with the responsibilities outlined above, which may also result in my being prevented from receiving further care from this office. · Other:____________________________________________________________________    AGREEMENT:    I have read the above and have had all of my questions answered. For chronic disease management, I know that my symptoms can be managed with many types of treatments. A chronic medication trial may be part of my treatment, but I must be an active participant in my care. Medication therapy is only one part of my symptom management plan. In some cases, there may be limited scientific evidence to support the chronic use of certain medications to improve symptoms and daily function. Furthermore, in certain circumstances, there may be scientific information that suggests that use of chronic controlled substances may actually worsen my symptoms and increase my risk of unintentional death directly related to this medication therapy. I know that if my provider feels my risk from controlled medications is greater than my benefit, I will have my controlled substance medication(s) compassionately lowered or removed altogether. I agree to a controlled substance medication trial.      I further agree to allow this office to contact family or friends if there are concerns about my safety and use of the controlled medications. I have agreed to use the following medications above as instructed by my physician and as stated in this [facility] [id number].      Patient Signature:  ______________________  Date:8/24/2018 or _____________    Provider Signature:______________________  Date:8/24/2018 or _____________

## 2018-08-27 LAB
ORGANISM: ABNORMAL
URINE CULTURE, ROUTINE: ABNORMAL
URINE CULTURE, ROUTINE: ABNORMAL

## 2018-08-27 RX ORDER — AMPICILLIN 500 MG/1
500 CAPSULE ORAL 3 TIMES DAILY
Qty: 15 CAPSULE | Refills: 0 | Status: SHIPPED | OUTPATIENT
Start: 2018-08-27 | End: 2018-09-01

## 2018-08-31 RX ORDER — OMEPRAZOLE 20 MG/1
CAPSULE, DELAYED RELEASE ORAL
Qty: 90 CAPSULE | Refills: 3 | Status: SHIPPED | OUTPATIENT
Start: 2018-08-31 | End: 2019-10-07 | Stop reason: SDUPTHER

## 2018-09-14 ENCOUNTER — OFFICE VISIT (OUTPATIENT)
Dept: FAMILY MEDICINE CLINIC | Age: 78
End: 2018-09-14

## 2018-09-14 VITALS
BODY MASS INDEX: 25.79 KG/M2 | HEART RATE: 72 BPM | TEMPERATURE: 97.8 F | OXYGEN SATURATION: 100 % | RESPIRATION RATE: 12 BRPM | SYSTOLIC BLOOD PRESSURE: 126 MMHG | WEIGHT: 145.6 LBS | DIASTOLIC BLOOD PRESSURE: 78 MMHG

## 2018-09-14 DIAGNOSIS — F41.1 ANXIETY STATE: ICD-10-CM

## 2018-09-14 DIAGNOSIS — R48.2 GAIT APRAXIA: ICD-10-CM

## 2018-09-14 DIAGNOSIS — B35.9 TINEA: ICD-10-CM

## 2018-09-14 DIAGNOSIS — M70.61 TROCHANTERIC BURSITIS OF RIGHT HIP: Primary | ICD-10-CM

## 2018-09-14 DIAGNOSIS — Z23 NEED FOR INFLUENZA VACCINATION: ICD-10-CM

## 2018-09-14 DIAGNOSIS — R30.0 DYSURIA: ICD-10-CM

## 2018-09-14 PROBLEM — S22.41XD CLOSED FRACTURE OF MULTIPLE RIBS OF RIGHT SIDE WITH ROUTINE HEALING: Status: RESOLVED | Noted: 2018-08-23 | Resolved: 2018-09-14

## 2018-09-14 LAB
BILIRUBIN, POC: NEGATIVE
BLOOD URINE, POC: ABNORMAL
CLARITY, POC: ABNORMAL
COLOR, POC: YELLOW
GLUCOSE URINE, POC: NEGATIVE
KETONES, POC: NEGATIVE
LEUKOCYTE EST, POC: ABNORMAL
NITRITE, POC: NEGATIVE
PH, POC: 70
PROTEIN, POC: ABNORMAL
SPECIFIC GRAVITY, POC: 1010
UROBILINOGEN, POC: 0.2

## 2018-09-14 PROCEDURE — G0008 ADMIN INFLUENZA VIRUS VAC: HCPCS | Performed by: FAMILY MEDICINE

## 2018-09-14 PROCEDURE — 81002 URINALYSIS NONAUTO W/O SCOPE: CPT | Performed by: FAMILY MEDICINE

## 2018-09-14 PROCEDURE — 90662 IIV NO PRSV INCREASED AG IM: CPT | Performed by: FAMILY MEDICINE

## 2018-09-14 PROCEDURE — 99214 OFFICE O/P EST MOD 30 MIN: CPT | Performed by: FAMILY MEDICINE

## 2018-09-14 RX ORDER — ESCITALOPRAM OXALATE 10 MG/1
10 TABLET ORAL DAILY
Qty: 90 TABLET | Refills: 1 | Status: SHIPPED | OUTPATIENT
Start: 2018-09-14 | End: 2019-02-11 | Stop reason: SDUPTHER

## 2018-09-14 ASSESSMENT — PATIENT HEALTH QUESTIONNAIRE - PHQ9
SUM OF ALL RESPONSES TO PHQ QUESTIONS 1-9: 0
SUM OF ALL RESPONSES TO PHQ9 QUESTIONS 1 & 2: 0
SUM OF ALL RESPONSES TO PHQ QUESTIONS 1-9: 0
1. LITTLE INTEREST OR PLEASURE IN DOING THINGS: 0
2. FEELING DOWN, DEPRESSED OR HOPELESS: 0

## 2018-09-14 NOTE — PROGRESS NOTES
Vaccine Information Sheet, \"Influenza - Inactivated\"  given to Lulu Chavez, or parent/legal guardian of  Lulu Chavez and verbalized understanding. Patient responses:    Have you ever had a reaction to a flu vaccine? No  Are you able to eat eggs without adverse effects? Yes  Do you have any current illness? No  Have you ever had Guillian Frederick Syndrome? No    Flu vaccine given per order. Please see immunization tab. Current Influenza vaccine VIS given to patient. Influenza consent form/questionnaire completed and signed. Patient responses to  Influenza consent form / questionnaire  reviewed. Vaccine given per protocol.
MORNING (BEFORE BREAKFAST) 30 capsule 5    LORazepam (ATIVAN) 1 MG tablet TAKE 1 TABLET BY MOUTH 3 TIMES DAILY. (Patient taking differently: Take 1 mg by mouth every 8 hours as needed for Anxiety. Lella Isaiah ) 90 tablet 5    promethazine (PHENERGAN) 12.5 MG tablet TAKE 1 TABLET BY MOUTH EVERY 6 HOURS AS NEEDED FOR NAUSEA 30 tablet 0    escitalopram (LEXAPRO) 10 MG tablet Take 1 tablet by mouth daily TAKE 1 TABLET BY MOUTH DAILY 90 tablet 1    levothyroxine (SYNTHROID) 75 MCG tablet TAKE 1 TABLET BY MOUTH EVERY DAY 90 tablet 3    vitamin C (ASCORBIC ACID) 500 MG tablet Take 500 mg by mouth daily      vitamin D (CHOLECALCIFEROL) 1000 UNIT TABS tablet Take 2,000 Units by mouth daily       Coenzyme Q10-Fish Oil-Vit E (CO-Q 10 OMEGA-3 FISH OIL) CAPS Take 1 capsule by mouth daily.  Magnesium 100 MG CAPS Take 1 tablet by mouth daily.  Capsicum, Cayenne, (CAYENNE PEPPER PO) Take  by mouth.  Nutritional Supplements (JUICE PLUS FIBRE PO) Take 1 tablet by mouth daily.  Calcium & Magnesium Carbonates 311-232 MG CAPS Take 1 tablet by mouth 2 times daily.  Cranberry 500 MG CAPS Take 2 tablets by mouth daily. Allergies   Allergen Reactions    Ampicillin Nausea Only    Biaxin [Clarithromycin]     Erythromycin     Lescol     Lipitor     Macrodantin [Nitrofurantoin]     Paxil [Paroxetine]     Zoloft [Sertraline Hcl] Nausea Only and Other (See Comments)     C/o nausea and headaches     Bactrim [Sulfamethoxazole-Trimethoprim] Rash    Biaxin [Clarithromycin] Nausea Only    Ciprofloxacin Rash     Pain under arms.     Erythromycin Nausea Only       Patient Active Problem List   Diagnosis    Esophageal reflux    Anxiety state    Pure hypercholesterolemia    Hypothyroidism    Hydronephrosis    IBS (irritable bowel syndrome)    TMJ arthralgia    Lumbar spinal stenosis    Patellofemoral arthritis    Perennial allergic rhinitis    Eczema    Renal insufficiency, mild    Kidney stone   

## 2018-09-16 LAB
ORGANISM: ABNORMAL
URINE CULTURE, ROUTINE: ABNORMAL
URINE CULTURE, ROUTINE: ABNORMAL

## 2018-09-16 RX ORDER — CEFUROXIME AXETIL 500 MG/1
500 TABLET ORAL 2 TIMES DAILY
Qty: 10 TABLET | Refills: 0 | Status: SHIPPED | OUTPATIENT
Start: 2018-09-16 | End: 2018-09-21

## 2018-09-20 RX ORDER — KETOCONAZOLE 20 MG/G
CREAM TOPICAL
Qty: 30 G | Refills: 1 | Status: SHIPPED | OUTPATIENT
Start: 2018-09-20 | End: 2019-02-11 | Stop reason: ALTCHOICE
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
## 2018-10-10 PROBLEM — J94.2 HEMOPNEUMOTHORAX ON RIGHT: Status: RESOLVED | Noted: 2018-08-23 | Resolved: 2018-10-10

## 2018-10-12 ENCOUNTER — OFFICE VISIT (OUTPATIENT)
Dept: FAMILY MEDICINE CLINIC | Age: 78
End: 2018-10-12
Payer: MEDICARE

## 2018-10-12 VITALS
OXYGEN SATURATION: 98 % | TEMPERATURE: 95.9 F | HEART RATE: 70 BPM | SYSTOLIC BLOOD PRESSURE: 119 MMHG | WEIGHT: 148.4 LBS | DIASTOLIC BLOOD PRESSURE: 58 MMHG | BODY MASS INDEX: 26.29 KG/M2

## 2018-10-12 DIAGNOSIS — R35.0 URINARY FREQUENCY: ICD-10-CM

## 2018-10-12 DIAGNOSIS — M70.61 TROCHANTERIC BURSITIS OF RIGHT HIP: ICD-10-CM

## 2018-10-12 DIAGNOSIS — Z87.81 HISTORY OF RIB FRACTURE: ICD-10-CM

## 2018-10-12 DIAGNOSIS — B35.4 TINEA CORPORIS: ICD-10-CM

## 2018-10-12 DIAGNOSIS — M76.31 IT BAND SYNDROME, RIGHT: ICD-10-CM

## 2018-10-12 DIAGNOSIS — F41.1 ANXIETY STATE: Primary | ICD-10-CM

## 2018-10-12 LAB
BILIRUBIN, POC: NORMAL
BLOOD URINE, POC: NORMAL
CLARITY, POC: CLEAR
COLOR, POC: YELLOW
GLUCOSE URINE, POC: NORMAL
KETONES, POC: NORMAL
LEUKOCYTE EST, POC: NORMAL
NITRITE, POC: NORMAL
PH, POC: 7
PROTEIN, POC: NORMAL
SPECIFIC GRAVITY, POC: 1
UROBILINOGEN, POC: 0.2

## 2018-10-12 PROCEDURE — 99214 OFFICE O/P EST MOD 30 MIN: CPT | Performed by: FAMILY MEDICINE

## 2018-10-12 PROCEDURE — 81002 URINALYSIS NONAUTO W/O SCOPE: CPT | Performed by: FAMILY MEDICINE

## 2018-10-12 RX ORDER — LORAZEPAM 1 MG/1
TABLET ORAL
Qty: 90 TABLET | Refills: 2 | Status: SHIPPED | OUTPATIENT
Start: 2018-10-12 | End: 2019-02-07 | Stop reason: SDUPTHER

## 2019-01-05 DIAGNOSIS — E03.9 ACQUIRED HYPOTHYROIDISM: ICD-10-CM

## 2019-01-07 RX ORDER — LEVOTHYROXINE SODIUM 0.07 MG/1
TABLET ORAL
Qty: 90 TABLET | Refills: 0 | Status: SHIPPED | OUTPATIENT
Start: 2019-01-07 | End: 2019-04-13 | Stop reason: SDUPTHER

## 2019-02-01 ENCOUNTER — TELEPHONE (OUTPATIENT)
Dept: FAMILY MEDICINE CLINIC | Age: 79
End: 2019-02-01

## 2019-02-07 ENCOUNTER — TELEPHONE (OUTPATIENT)
Dept: FAMILY MEDICINE CLINIC | Age: 79
End: 2019-02-07

## 2019-02-07 DIAGNOSIS — F41.1 ANXIETY STATE: ICD-10-CM

## 2019-02-08 RX ORDER — LORAZEPAM 1 MG/1
TABLET ORAL
Qty: 21 TABLET | Refills: 0 | Status: SHIPPED | OUTPATIENT
Start: 2019-02-08 | End: 2019-02-11 | Stop reason: SDUPTHER

## 2019-02-10 PROBLEM — R91.8 LUNG NODULES: Status: RESOLVED | Noted: 2017-08-07 | Resolved: 2019-02-10

## 2019-02-11 ENCOUNTER — OFFICE VISIT (OUTPATIENT)
Dept: FAMILY MEDICINE CLINIC | Age: 79
End: 2019-02-11
Payer: MEDICARE

## 2019-02-11 VITALS
TEMPERATURE: 96.8 F | BODY MASS INDEX: 26.57 KG/M2 | SYSTOLIC BLOOD PRESSURE: 116 MMHG | WEIGHT: 150 LBS | DIASTOLIC BLOOD PRESSURE: 56 MMHG | RESPIRATION RATE: 12 BRPM | OXYGEN SATURATION: 100 % | HEART RATE: 73 BPM

## 2019-02-11 DIAGNOSIS — N28.9 RENAL INSUFFICIENCY, MILD: ICD-10-CM

## 2019-02-11 DIAGNOSIS — F41.1 ANXIETY STATE: ICD-10-CM

## 2019-02-11 DIAGNOSIS — M48.061 SPINAL STENOSIS OF LUMBAR REGION WITHOUT NEUROGENIC CLAUDICATION: ICD-10-CM

## 2019-02-11 DIAGNOSIS — E78.00 PURE HYPERCHOLESTEROLEMIA: Primary | ICD-10-CM

## 2019-02-11 DIAGNOSIS — M70.61 TROCHANTERIC BURSITIS OF RIGHT HIP: ICD-10-CM

## 2019-02-11 DIAGNOSIS — R30.0 DYSURIA: ICD-10-CM

## 2019-02-11 LAB
BILIRUBIN, POC: NEGATIVE
BLOOD URINE, POC: NORMAL
CLARITY, POC: CLEAR
COLOR, POC: YELLOW
GLUCOSE URINE, POC: NEGATIVE
KETONES, POC: NEGATIVE
LEUKOCYTE EST, POC: NORMAL
NITRITE, POC: POSITIVE
PH, POC: 8
PROTEIN, POC: NEGATIVE
SPECIFIC GRAVITY, POC: 1
UROBILINOGEN, POC: 0.2

## 2019-02-11 PROCEDURE — 99214 OFFICE O/P EST MOD 30 MIN: CPT | Performed by: FAMILY MEDICINE

## 2019-02-11 PROCEDURE — 81002 URINALYSIS NONAUTO W/O SCOPE: CPT | Performed by: FAMILY MEDICINE

## 2019-02-11 RX ORDER — FENOFIBRATE 134 MG/1
134 CAPSULE ORAL
Qty: 30 CAPSULE | Refills: 2 | Status: SHIPPED | OUTPATIENT
Start: 2019-02-11 | End: 2019-05-06 | Stop reason: SDUPTHER

## 2019-02-11 RX ORDER — LORAZEPAM 1 MG/1
TABLET ORAL
Qty: 90 TABLET | Refills: 2 | Status: SHIPPED | OUTPATIENT
Start: 2019-02-11 | End: 2019-05-06 | Stop reason: SDUPTHER

## 2019-02-11 RX ORDER — ESCITALOPRAM OXALATE 10 MG/1
10 TABLET ORAL DAILY
Qty: 30 TABLET | Refills: 2 | Status: SHIPPED | OUTPATIENT
Start: 2019-02-11 | End: 2019-05-06 | Stop reason: SDUPTHER

## 2019-02-11 ASSESSMENT — PATIENT HEALTH QUESTIONNAIRE - PHQ9
1. LITTLE INTEREST OR PLEASURE IN DOING THINGS: 1
SUM OF ALL RESPONSES TO PHQ QUESTIONS 1-9: 2
SUM OF ALL RESPONSES TO PHQ QUESTIONS 1-9: 2
2. FEELING DOWN, DEPRESSED OR HOPELESS: 1
SUM OF ALL RESPONSES TO PHQ9 QUESTIONS 1 & 2: 2

## 2019-02-13 DIAGNOSIS — N30.00 ACUTE CYSTITIS WITHOUT HEMATURIA: Primary | ICD-10-CM

## 2019-02-13 LAB
ORGANISM: ABNORMAL
URINE CULTURE, ROUTINE: ABNORMAL
URINE CULTURE, ROUTINE: ABNORMAL

## 2019-02-13 RX ORDER — AMOXICILLIN AND CLAVULANATE POTASSIUM 875; 125 MG/1; MG/1
1 TABLET, FILM COATED ORAL 2 TIMES DAILY
Qty: 10 TABLET | Refills: 0 | Status: SHIPPED | OUTPATIENT
Start: 2019-02-13 | End: 2019-02-18

## 2019-02-14 RX ORDER — ONDANSETRON 4 MG/1
4 TABLET, ORALLY DISINTEGRATING ORAL 3 TIMES DAILY PRN
Qty: 6 TABLET | Refills: 0 | Status: SHIPPED | OUTPATIENT
Start: 2019-02-14 | End: 2019-02-27 | Stop reason: SDUPTHER

## 2019-02-14 RX ORDER — CEPHALEXIN 500 MG/1
500 CAPSULE ORAL 3 TIMES DAILY
Qty: 15 CAPSULE | Refills: 0 | Status: SHIPPED | OUTPATIENT
Start: 2019-02-14 | End: 2019-02-19

## 2019-04-30 ENCOUNTER — TELEPHONE (OUTPATIENT)
Dept: FAMILY MEDICINE CLINIC | Age: 79
End: 2019-04-30

## 2019-04-30 DIAGNOSIS — E78.00 PURE HYPERCHOLESTEROLEMIA: ICD-10-CM

## 2019-04-30 DIAGNOSIS — N28.9 RENAL INSUFFICIENCY, MILD: ICD-10-CM

## 2019-04-30 LAB
A/G RATIO: 1.7 (ref 1.1–2.2)
ALBUMIN SERPL-MCNC: 4.1 G/DL (ref 3.4–5)
ALP BLD-CCNC: 57 U/L (ref 40–129)
ALT SERPL-CCNC: 12 U/L (ref 10–40)
ANION GAP SERPL CALCULATED.3IONS-SCNC: 13 MMOL/L (ref 3–16)
AST SERPL-CCNC: 19 U/L (ref 15–37)
BILIRUB SERPL-MCNC: 0.3 MG/DL (ref 0–1)
BUN BLDV-MCNC: 21 MG/DL (ref 7–20)
CALCIUM SERPL-MCNC: 10.6 MG/DL (ref 8.3–10.6)
CHLORIDE BLD-SCNC: 107 MMOL/L (ref 99–110)
CHOLESTEROL, TOTAL: 190 MG/DL (ref 0–199)
CO2: 26 MMOL/L (ref 21–32)
CREAT SERPL-MCNC: 1.2 MG/DL (ref 0.6–1.2)
GFR AFRICAN AMERICAN: 53
GFR NON-AFRICAN AMERICAN: 44
GLOBULIN: 2.4 G/DL
GLUCOSE BLD-MCNC: 101 MG/DL (ref 70–99)
HCT VFR BLD CALC: 40.9 % (ref 36–48)
HDLC SERPL-MCNC: 66 MG/DL (ref 40–60)
HEMOGLOBIN: 13.1 G/DL (ref 12–16)
LDL CHOLESTEROL CALCULATED: 108 MG/DL
MCH RBC QN AUTO: 27.5 PG (ref 26–34)
MCHC RBC AUTO-ENTMCNC: 32 G/DL (ref 31–36)
MCV RBC AUTO: 85.9 FL (ref 80–100)
PDW BLD-RTO: 15.8 % (ref 12.4–15.4)
PLATELET # BLD: 262 K/UL (ref 135–450)
PMV BLD AUTO: 10.2 FL (ref 5–10.5)
POTASSIUM SERPL-SCNC: 4.6 MMOL/L (ref 3.5–5.1)
RBC # BLD: 4.76 M/UL (ref 4–5.2)
SODIUM BLD-SCNC: 146 MMOL/L (ref 136–145)
T4 FREE: 1.6 NG/DL (ref 0.9–1.8)
TOTAL PROTEIN: 6.5 G/DL (ref 6.4–8.2)
TRIGL SERPL-MCNC: 81 MG/DL (ref 0–150)
TSH REFLEX: 7.24 UIU/ML (ref 0.27–4.2)
VLDLC SERPL CALC-MCNC: 16 MG/DL
WBC # BLD: 5.1 K/UL (ref 4–11)

## 2019-04-30 NOTE — TELEPHONE ENCOUNTER
Pt states she is going to get her blood drawn today and she needs a appointment on Friday to come in and be seen to talk about her results. Please advise.

## 2019-04-30 NOTE — TELEPHONE ENCOUNTER
Patient is requesting an appointment on Friday. Nothing available except \"same-day\". Is it ok to schedule in one of those slots? Please advise. Thanks.

## 2019-05-06 DIAGNOSIS — E78.00 PURE HYPERCHOLESTEROLEMIA: ICD-10-CM

## 2019-05-06 DIAGNOSIS — F41.1 ANXIETY STATE: ICD-10-CM

## 2019-05-07 RX ORDER — ESCITALOPRAM OXALATE 10 MG/1
10 TABLET ORAL DAILY
Qty: 30 TABLET | Refills: 2 | Status: SHIPPED | OUTPATIENT
Start: 2019-05-07 | End: 2019-09-14 | Stop reason: SDUPTHER

## 2019-05-07 RX ORDER — LORAZEPAM 1 MG/1
TABLET ORAL
Qty: 90 TABLET | Refills: 0 | Status: SHIPPED | OUTPATIENT
Start: 2019-05-12 | End: 2019-05-13 | Stop reason: SDUPTHER

## 2019-05-07 RX ORDER — FENOFIBRATE 134 MG/1
CAPSULE ORAL
Qty: 30 CAPSULE | Refills: 5 | Status: SHIPPED | OUTPATIENT
Start: 2019-05-07 | End: 2019-10-07 | Stop reason: SDUPTHER

## 2019-05-07 NOTE — TELEPHONE ENCOUNTER
Controlled Substances Monitoring:     RX Monitoring 5/7/2019   Attestation The Prescription Monitoring Report for this patient was reviewed today.    Chronic Pain Routine Monitoring No signs of potential drug abuse or diversion identified: otherwise, see note documentation   Chronic Pain > 80 MEDD -

## 2019-05-12 NOTE — PROGRESS NOTES
Medications Marked as Taking for the 5/13/19 encounter (Office Visit) with Emelia Cruz MD   Medication Sig Dispense Refill    LORazepam (ATIVAN) 1 MG tablet TAKE 1 TABLET BY MOUTH THREE TIMES A DAY 90 tablet 0    fenofibrate micronized (LOFIBRA) 134 MG capsule TAKE 1 CAPSULE BY MOUTH EVERY DAY IN THE MORNING BEFORE BREAKFAST 30 capsule 5    escitalopram (LEXAPRO) 10 MG tablet TAKE 1 TABLET BY MOUTH DAILY TAKE 1 TABLET BY MOUTH DAILY 30 tablet 2    levothyroxine (SYNTHROID) 75 MCG tablet TAKE 1 TABLET BY MOUTH EVERY DAY 90 tablet 0    ondansetron (ZOFRAN-ODT) 4 MG disintegrating tablet TAKE 1 TABLET BY MOUTH 3 TIMES DAILY AS NEEDED FOR NAUSEA OR VOMITING 6 tablet 0    omeprazole (PRILOSEC) 20 MG delayed release capsule TAKE 1 CAPSULE BY MOUTH DAILY 90 capsule 3    acetaminophen (TYLENOL) 325 MG tablet Take 975 mg by mouth every 8 hours as needed for Pain      vitamin C (ASCORBIC ACID) 500 MG tablet Take 500 mg by mouth daily      vitamin D (CHOLECALCIFEROL) 1000 UNIT TABS tablet Take 2,000 Units by mouth daily       Coenzyme Q10-Fish Oil-Vit E (CO-Q 10 OMEGA-3 FISH OIL) CAPS Take 1 capsule by mouth daily.  Magnesium 100 MG CAPS Take 1 tablet by mouth daily.  Capsicum, Cayenne, (CAYENNE PEPPER PO) Take  by mouth.  Nutritional Supplements (JUICE PLUS FIBRE PO) Take 1 tablet by mouth daily.  Calcium & Magnesium Carbonates 311-232 MG CAPS Take 1 tablet by mouth 2 times daily.  Cranberry 500 MG CAPS Take 2 tablets by mouth daily. Allergies   Allergen Reactions    Biaxin [Clarithromycin]     Erythromycin     Lescol     Lipitor     Macrodantin [Nitrofurantoin]     Paxil [Paroxetine]     Zoloft [Sertraline Hcl] Nausea Only and Other (See Comments)     C/o nausea and headaches     Bactrim [Sulfamethoxazole-Trimethoprim] Rash    Biaxin [Clarithromycin] Nausea Only    Ciprofloxacin Rash     Pain under arms.     Erythromycin Nausea Only       Patient Active Problem List   Diagnosis    Esophageal reflux    Anxiety state    Pure hypercholesterolemia    Hypothyroidism    Hydronephrosis    IBS (irritable bowel syndrome)    TMJ arthralgia    Lumbar spinal stenosis    Patellofemoral arthritis    Perennial allergic rhinitis    Eczema    Renal insufficiency, mild    Kidney stone    Benign head tremor       Past Medical History:   Diagnosis Date    Anal pruritus 7/21/2015    Closed fracture of multiple ribs of right side with routine healing 7/89/2786    Diastolic dysfunction 7/09/4906    Falls 07/13/2018    Gastrocnemius muscle rupture 7/18/2016    Hemopneumothorax on right 8/23/2018    Lung nodules 8/7/2017     CT chest 2/2018 stable; no follow up needed    Postmenopausal atrophic vaginitis 11/14/2011    Pruritic disorder 9/27/2013    Seborrheic keratosis 5/15/2013    Shingles 9/18/2014       Past Surgical History:   Procedure Laterality Date    TONSILLECTOMY          Family History   Problem Relation Age of Onset    Thyroid Cancer Daughter 52       Social History     Tobacco Use    Smoking status: Never Smoker    Smokeless tobacco: Never Used   Substance Use Topics    Alcohol use: No    Drug use: No            Review of Systems  Review of Systems    Objective:   Physical Exam  Vitals:    05/13/19 1552   BP: 132/60   Pulse: 67   Resp: 12   Temp: 96.4 °F (35.8 °C)   TempSrc: Oral   SpO2: 99%   Weight: 154 lb 3.2 oz (69.9 kg)       Physical Exam    NAD    Skin is warm and dry. No rash. Well hydrated  Alert and oriented x 3. Mood and affect are normal.  The neck is supple and free of adenopathy or masses, the thyroid is normal without enlargement or nodules. Chest: clear with no wheezes or rales. No retractions, or use of accessory muscles noted. Cardiovascular: PMI is not displaced, and no thrill noted. Regular rate and rhythm with no rub, murmur or gallop. No peripheral edema. No carotid bruits.     The abdomen is soft without tenderness, guarding, mass, rebound or organomegaly. Aorta, femoral, DP and PT pulses intact. Assessment:       Diagnosis Orders   1. Pure hypercholesterolemia  At goal LDL < 130 with fenofibrate. Intolerant statins   2. Acquired hypothyroidism  levothyroxine (SYNTHROID) 88 MCG tablet    TSH with Reflex  TSH not at goal 1 -2. Increase levothyroxine   TSH in 6 weeks. 3. Renal insufficiency, mild  Basic Metabolic Panel  Worsening renal fx. Push fluids, erum water - she admits to not drinking enough recently. Avoid NSAIDS. Repeat labs 6 weeks        5. Anxiety state  LORazepam (ATIVAN) 1 MG tablet  Controlled Substances Monitoring:     RX Monitoring 5/13/2019   Attestation The Prescription Monitoring Report for this patient was reviewed today. Chronic Pain Routine Monitoring Possible medication side effects, risk of tolerance/dependence & alternative treatments discussed. ;No signs of potential drug abuse or diversion identified: otherwise, see note documentation   Chronic Pain > 80 MEDD -       6. Hematuria, unspecified type  POCT Urinalysis no Micro    Urine Culture  Hold tx pending cx  Continue follow up with Dr. Andrew Alcaraz:      Side effects of current medications reviewed and questions answered. Follow up in 6 months or prn.

## 2019-05-13 ENCOUNTER — OFFICE VISIT (OUTPATIENT)
Dept: FAMILY MEDICINE CLINIC | Age: 79
End: 2019-05-13
Payer: MEDICARE

## 2019-05-13 VITALS
RESPIRATION RATE: 12 BRPM | HEART RATE: 67 BPM | OXYGEN SATURATION: 99 % | SYSTOLIC BLOOD PRESSURE: 128 MMHG | WEIGHT: 154.2 LBS | DIASTOLIC BLOOD PRESSURE: 68 MMHG | BODY MASS INDEX: 27.32 KG/M2 | TEMPERATURE: 96.4 F

## 2019-05-13 DIAGNOSIS — F41.1 ANXIETY STATE: ICD-10-CM

## 2019-05-13 DIAGNOSIS — E03.9 ACQUIRED HYPOTHYROIDISM: ICD-10-CM

## 2019-05-13 DIAGNOSIS — N28.9 RENAL INSUFFICIENCY, MILD: ICD-10-CM

## 2019-05-13 DIAGNOSIS — E78.00 PURE HYPERCHOLESTEROLEMIA: Primary | ICD-10-CM

## 2019-05-13 DIAGNOSIS — R31.9 HEMATURIA, UNSPECIFIED TYPE: ICD-10-CM

## 2019-05-13 DIAGNOSIS — N18.30 CHRONIC KIDNEY DISEASE, STAGE III (MODERATE) (HCC): ICD-10-CM

## 2019-05-13 LAB
BILIRUBIN, POC: NEGATIVE
BLOOD URINE, POC: ABNORMAL
CLARITY, POC: ABNORMAL
COLOR, POC: YELLOW
GLUCOSE URINE, POC: NEGATIVE
KETONES, POC: NEGATIVE
LEUKOCYTE EST, POC: ABNORMAL
NITRITE, POC: NEGATIVE
PH, POC: 8.5
PROTEIN, POC: ABNORMAL
SPECIFIC GRAVITY, POC: 1.01
UROBILINOGEN, POC: 0.2

## 2019-05-13 PROCEDURE — 99214 OFFICE O/P EST MOD 30 MIN: CPT | Performed by: FAMILY MEDICINE

## 2019-05-13 PROCEDURE — 81002 URINALYSIS NONAUTO W/O SCOPE: CPT | Performed by: FAMILY MEDICINE

## 2019-05-13 RX ORDER — LEVOTHYROXINE SODIUM 88 UG/1
TABLET ORAL
Qty: 90 TABLET | Refills: 1 | Status: SHIPPED | OUTPATIENT
Start: 2019-05-13 | End: 2019-11-03 | Stop reason: SDUPTHER

## 2019-05-13 RX ORDER — ONDANSETRON 4 MG/1
4 TABLET, ORALLY DISINTEGRATING ORAL 3 TIMES DAILY PRN
Qty: 6 TABLET | Refills: 0 | Status: CANCELLED | OUTPATIENT
Start: 2019-05-13

## 2019-05-13 RX ORDER — LORAZEPAM 1 MG/1
1 TABLET ORAL 3 TIMES DAILY
Qty: 90 TABLET | Refills: 2 | Status: SHIPPED | OUTPATIENT
Start: 2019-05-13 | End: 2019-07-08 | Stop reason: SDUPTHER

## 2019-05-13 RX ORDER — ONDANSETRON 4 MG/1
4 TABLET, ORALLY DISINTEGRATING ORAL EVERY 8 HOURS PRN
Qty: 30 TABLET | Refills: 5 | Status: SHIPPED | OUTPATIENT
Start: 2019-05-13 | End: 2020-04-03 | Stop reason: SDUPTHER

## 2019-05-16 LAB
ORGANISM: ABNORMAL
URINE CULTURE, ROUTINE: ABNORMAL
URINE CULTURE, ROUTINE: ABNORMAL

## 2019-05-16 RX ORDER — CEPHALEXIN 250 MG/1
250 CAPSULE ORAL 3 TIMES DAILY
Qty: 15 CAPSULE | Refills: 0 | Status: SHIPPED | OUTPATIENT
Start: 2019-05-16 | End: 2019-05-21

## 2019-07-03 DIAGNOSIS — E03.9 ACQUIRED HYPOTHYROIDISM: ICD-10-CM

## 2019-07-03 DIAGNOSIS — N28.9 RENAL INSUFFICIENCY, MILD: ICD-10-CM

## 2019-07-04 LAB
ANION GAP SERPL CALCULATED.3IONS-SCNC: 12 MMOL/L (ref 3–16)
BUN BLDV-MCNC: 19 MG/DL (ref 7–20)
CALCIUM SERPL-MCNC: 10.1 MG/DL (ref 8.3–10.6)
CHLORIDE BLD-SCNC: 102 MMOL/L (ref 99–110)
CO2: 27 MMOL/L (ref 21–32)
CREAT SERPL-MCNC: 1 MG/DL (ref 0.6–1.2)
GFR AFRICAN AMERICAN: >60
GFR NON-AFRICAN AMERICAN: 54
GLUCOSE BLD-MCNC: 102 MG/DL (ref 70–99)
POTASSIUM SERPL-SCNC: 4.3 MMOL/L (ref 3.5–5.1)
SODIUM BLD-SCNC: 141 MMOL/L (ref 136–145)
TSH REFLEX: 1.75 UIU/ML (ref 0.27–4.2)

## 2019-07-08 ENCOUNTER — OFFICE VISIT (OUTPATIENT)
Dept: FAMILY MEDICINE CLINIC | Age: 79
End: 2019-07-08
Payer: MEDICARE

## 2019-07-08 VITALS
DIASTOLIC BLOOD PRESSURE: 62 MMHG | SYSTOLIC BLOOD PRESSURE: 112 MMHG | TEMPERATURE: 98 F | HEART RATE: 72 BPM | RESPIRATION RATE: 12 BRPM | BODY MASS INDEX: 24.17 KG/M2 | OXYGEN SATURATION: 98 % | HEIGHT: 67 IN | WEIGHT: 154 LBS

## 2019-07-08 DIAGNOSIS — E78.00 PURE HYPERCHOLESTEROLEMIA: Primary | ICD-10-CM

## 2019-07-08 DIAGNOSIS — E03.9 ACQUIRED HYPOTHYROIDISM: ICD-10-CM

## 2019-07-08 DIAGNOSIS — R30.0 DYSURIA: ICD-10-CM

## 2019-07-08 DIAGNOSIS — F41.1 ANXIETY STATE: ICD-10-CM

## 2019-07-08 LAB
BILIRUBIN, POC: NEGATIVE
BLOOD URINE, POC: NEGATIVE
CLARITY, POC: ABNORMAL
COLOR, POC: YELLOW
GLUCOSE URINE, POC: NEGATIVE
KETONES, POC: NEGATIVE
LEUKOCYTE EST, POC: ABNORMAL
NITRITE, POC: NEGATIVE
PH, POC: 7
PROTEIN, POC: NEGATIVE
SPECIFIC GRAVITY, POC: 1.01
UROBILINOGEN, POC: 0.2

## 2019-07-08 PROCEDURE — 99214 OFFICE O/P EST MOD 30 MIN: CPT | Performed by: FAMILY MEDICINE

## 2019-07-08 PROCEDURE — 81002 URINALYSIS NONAUTO W/O SCOPE: CPT | Performed by: FAMILY MEDICINE

## 2019-07-08 RX ORDER — LORAZEPAM 1 MG/1
1 TABLET ORAL 3 TIMES DAILY
Qty: 90 TABLET | Refills: 2 | Status: SHIPPED | OUTPATIENT
Start: 2019-08-03 | End: 2019-10-07 | Stop reason: SDUPTHER

## 2019-07-11 LAB
ORGANISM: ABNORMAL
URINE CULTURE, ROUTINE: ABNORMAL
URINE CULTURE, ROUTINE: ABNORMAL

## 2019-07-11 RX ORDER — CEFUROXIME AXETIL 500 MG/1
500 TABLET ORAL 2 TIMES DAILY
Qty: 10 TABLET | Refills: 0 | Status: SHIPPED | OUTPATIENT
Start: 2019-07-11 | End: 2019-07-16

## 2019-08-19 ENCOUNTER — TELEPHONE (OUTPATIENT)
Dept: FAMILY MEDICINE CLINIC | Age: 79
End: 2019-08-19

## 2019-08-19 DIAGNOSIS — R30.0 DYSURIA: Primary | ICD-10-CM

## 2019-08-20 DIAGNOSIS — R30.0 DYSURIA: ICD-10-CM

## 2019-08-20 LAB
BACTERIA: ABNORMAL /HPF
BILIRUBIN URINE: NEGATIVE
BLOOD, URINE: NEGATIVE
CLARITY: ABNORMAL
COLOR: YELLOW
EPITHELIAL CELLS, UA: 1 /HPF (ref 0–5)
GLUCOSE URINE: NEGATIVE MG/DL
HYALINE CASTS: 0 /LPF (ref 0–8)
KETONES, URINE: NEGATIVE MG/DL
LEUKOCYTE ESTERASE, URINE: ABNORMAL
MICROSCOPIC EXAMINATION: YES
NITRITE, URINE: NEGATIVE
PH UA: 8 (ref 5–8)
PROTEIN UA: NEGATIVE MG/DL
RBC UA: 4 /HPF (ref 0–4)
SPECIFIC GRAVITY UA: 1.01 (ref 1–1.03)
URINE REFLEX TO CULTURE: YES
URINE TYPE: ABNORMAL
UROBILINOGEN, URINE: 0.2 E.U./DL
WBC UA: 21 /HPF (ref 0–5)

## 2019-08-22 ENCOUNTER — TELEPHONE (OUTPATIENT)
Dept: FAMILY MEDICINE CLINIC | Age: 79
End: 2019-08-22

## 2019-08-22 NOTE — TELEPHONE ENCOUNTER
Pharmacy calling, asking PCP Caro to allow override for pt's LORazepam (ATIVAN) 1 MG tablet. Per their records, pt is not due for next fill until 8/29/19, but pt states her  was in hospital a few days ago, and admitted to taking more than usual due to anxiety. OK to allow early fill?     Pharmacy Contact # [phone]    Pharmacy:  [facility] [address] [address] [address], [address] [facility] - F [phone]

## 2019-08-22 NOTE — TELEPHONE ENCOUNTER
I will ok this time but she really needs to let me know BEFORE she increases the dose since this is a controlled substance.

## 2019-08-23 RX ORDER — CEPHALEXIN 500 MG/1
500 CAPSULE ORAL 3 TIMES DAILY
Qty: 21 CAPSULE | Refills: 0 | Status: SHIPPED | OUTPATIENT
Start: 2019-08-23 | End: 2019-08-30

## 2019-08-23 RX ORDER — TRIAMCINOLONE ACETONIDE 0.1 %
PASTE (GRAM) DENTAL 3 TIMES DAILY
Qty: 1 TUBE | Refills: 1 | Status: SHIPPED | OUTPATIENT
Start: 2019-08-23 | End: 2019-10-07

## 2019-08-24 LAB
ORGANISM: ABNORMAL
ORGANISM: ABNORMAL
URINE CULTURE, ROUTINE: ABNORMAL
URINE CULTURE, ROUTINE: ABNORMAL

## 2019-08-27 ENCOUNTER — OFFICE VISIT (OUTPATIENT)
Dept: FAMILY MEDICINE CLINIC | Age: 79
End: 2019-08-27
Payer: MEDICARE

## 2019-08-27 VITALS
HEART RATE: 70 BPM | TEMPERATURE: 97 F | RESPIRATION RATE: 12 BRPM | OXYGEN SATURATION: 98 % | DIASTOLIC BLOOD PRESSURE: 78 MMHG | BODY MASS INDEX: 23.02 KG/M2 | SYSTOLIC BLOOD PRESSURE: 124 MMHG | WEIGHT: 144.8 LBS

## 2019-08-27 DIAGNOSIS — F41.1 GAD (GENERALIZED ANXIETY DISORDER): ICD-10-CM

## 2019-08-27 DIAGNOSIS — N30.00 ACUTE CYSTITIS WITHOUT HEMATURIA: ICD-10-CM

## 2019-08-27 DIAGNOSIS — K12.0 APHTHOUS ULCER: Primary | ICD-10-CM

## 2019-08-27 PROCEDURE — 99214 OFFICE O/P EST MOD 30 MIN: CPT | Performed by: FAMILY MEDICINE

## 2019-08-27 NOTE — PROGRESS NOTES
Systems  Review of Systems    Objective:   Physical Exam  Vitals:    08/27/19 1539   BP: 124/78   Pulse: 70   Resp: 12   Temp: 97 °F (36.1 °C)   TempSrc: Oral   SpO2: 98%   Weight: 144 lb 12.8 oz (65.7 kg)       Physical Exam    NAD  Skin is warm and dry. Mood and affect are mildly anxious. No agitation or psychomotor retardation. No pressured speech, grandiosity or tangential thoughts. Insight and judgement are intact. Not suicidal.   Right lower jaw distal to the last molar mild erythema. No induration, dental tenderness, abscess. No cervical, supraclavicular or submandibular LNS. The neck is supple and free of adenopathy or masses, the thyroid is normal without enlargement or nodules. Chest is clear, no wheezing or rales. Normal symmetric air entry throughout both lung fields. Heart regular with normal rate, no murmer or gallop     The abdomen is soft without tenderness, guarding, mass, rebound or organomegaly. Bowel sounds are normal. No CVA tenderness  noted. Assessment:       Diagnosis Orders   1. Aphthous ulcer  Improving    2. CAMI (generalized anxiety disorder)  Strategies addressed    3. Acute cystitis without hematuria  Resolving   Advise to push fluids. Plan:      Side effects of current medications reviewed and questions answered. Call or return to clinic prn if these symptoms worsen or fail to improve as anticipated.

## 2019-08-30 ENCOUNTER — TELEPHONE (OUTPATIENT)
Dept: FAMILY MEDICINE CLINIC | Age: 79
End: 2019-08-30

## 2019-09-04 ENCOUNTER — OFFICE VISIT (OUTPATIENT)
Dept: FAMILY MEDICINE CLINIC | Age: 79
End: 2019-09-04
Payer: MEDICARE

## 2019-09-04 VITALS
TEMPERATURE: 98.8 F | WEIGHT: 146.6 LBS | HEART RATE: 74 BPM | OXYGEN SATURATION: 99 % | RESPIRATION RATE: 12 BRPM | DIASTOLIC BLOOD PRESSURE: 76 MMHG | SYSTOLIC BLOOD PRESSURE: 129 MMHG | BODY MASS INDEX: 23.31 KG/M2

## 2019-09-04 DIAGNOSIS — K04.7 DENTAL ABSCESS: Primary | ICD-10-CM

## 2019-09-04 PROCEDURE — 99213 OFFICE O/P EST LOW 20 MIN: CPT | Performed by: FAMILY MEDICINE

## 2019-09-04 PROCEDURE — 3288F FALL RISK ASSESSMENT DOCD: CPT | Performed by: FAMILY MEDICINE

## 2019-09-04 RX ORDER — MOMETASONE FUROATE 1 MG/G
CREAM TOPICAL DAILY
COMMUNITY
End: 2019-09-04

## 2019-09-04 RX ORDER — MOMETASONE FUROATE 1 MG/G
CREAM TOPICAL DAILY
Qty: 1 TUBE | Refills: 2 | Status: SHIPPED | OUTPATIENT
Start: 2019-09-04 | End: 2021-07-05

## 2019-09-04 RX ORDER — CEPHALEXIN 500 MG/1
500 CAPSULE ORAL 3 TIMES DAILY
Qty: 30 CAPSULE | Refills: 0 | Status: SHIPPED | OUTPATIENT
Start: 2019-09-04 | End: 2019-09-14

## 2019-10-02 ENCOUNTER — APPOINTMENT (OUTPATIENT)
Dept: CT IMAGING | Age: 79
End: 2019-10-02
Payer: MEDICARE

## 2019-10-02 ENCOUNTER — HOSPITAL ENCOUNTER (EMERGENCY)
Age: 79
Discharge: HOME | End: 2019-10-03
Attending: EMERGENCY MEDICINE
Payer: MEDICARE

## 2019-10-02 VITALS
WEIGHT: 135 LBS | HEART RATE: 65 BPM | DIASTOLIC BLOOD PRESSURE: 72 MMHG | RESPIRATION RATE: 16 BRPM | OXYGEN SATURATION: 100 % | SYSTOLIC BLOOD PRESSURE: 150 MMHG | TEMPERATURE: 98 F | BODY MASS INDEX: 21.46 KG/M2

## 2019-10-02 DIAGNOSIS — N12 PYELONEPHRITIS: Primary | ICD-10-CM

## 2019-10-02 LAB
ANION GAP SERPL CALCULATED.3IONS-SCNC: 11 MMOL/L (ref 3–16)
BACTERIA: ABNORMAL /HPF
BASOPHILS ABSOLUTE: 0 K/UL (ref 0–0.2)
BASOPHILS RELATIVE PERCENT: 0.5 %
BILIRUBIN URINE: NEGATIVE
BLOOD, URINE: ABNORMAL
BUN BLDV-MCNC: 16 MG/DL (ref 7–20)
CALCIUM SERPL-MCNC: 9.5 MG/DL (ref 8.3–10.6)
CHLORIDE BLD-SCNC: 110 MMOL/L (ref 99–110)
CLARITY: CLEAR
CO2: 32 MMOL/L (ref 21–32)
COLOR: YELLOW
CREAT SERPL-MCNC: 1 MG/DL (ref 0.6–1.2)
EOSINOPHILS ABSOLUTE: 0.1 K/UL (ref 0–0.6)
EOSINOPHILS RELATIVE PERCENT: 0.9 %
EPITHELIAL CELLS, UA: ABNORMAL /HPF
GFR AFRICAN AMERICAN: >60
GFR NON-AFRICAN AMERICAN: 54
GLUCOSE BLD-MCNC: 97 MG/DL (ref 70–99)
GLUCOSE URINE: NEGATIVE MG/DL
HCT VFR BLD CALC: 40.4 % (ref 36–48)
HEMOGLOBIN: 13.2 G/DL (ref 12–16)
KETONES, URINE: NEGATIVE MG/DL
LEUKOCYTE ESTERASE, URINE: ABNORMAL
LYMPHOCYTES ABSOLUTE: 1.9 K/UL (ref 1–5.1)
LYMPHOCYTES RELATIVE PERCENT: 21.7 %
MCH RBC QN AUTO: 28.3 PG (ref 26–34)
MCHC RBC AUTO-ENTMCNC: 32.6 G/DL (ref 31–36)
MCV RBC AUTO: 86.9 FL (ref 80–100)
MICROSCOPIC EXAMINATION: YES
MONOCYTES ABSOLUTE: 0.7 K/UL (ref 0–1.3)
MONOCYTES RELATIVE PERCENT: 8.4 %
NEUTROPHILS ABSOLUTE: 6 K/UL (ref 1.7–7.7)
NEUTROPHILS RELATIVE PERCENT: 68.5 %
NITRITE, URINE: NEGATIVE
PDW BLD-RTO: 14.8 % (ref 12.4–15.4)
PH UA: 7.5 (ref 5–8)
PLATELET # BLD: 258 K/UL (ref 135–450)
PMV BLD AUTO: 10.1 FL (ref 5–10.5)
POTASSIUM SERPL-SCNC: 4.1 MMOL/L (ref 3.5–5.1)
PROTEIN UA: NEGATIVE MG/DL
RBC # BLD: 4.65 M/UL (ref 4–5.2)
RBC UA: ABNORMAL /HPF (ref 0–2)
SODIUM BLD-SCNC: 153 MMOL/L (ref 136–145)
SPECIFIC GRAVITY UA: 1.01 (ref 1–1.03)
URINE TYPE: ABNORMAL
UROBILINOGEN, URINE: 0.2 E.U./DL
WBC # BLD: 8.8 K/UL (ref 4–11)
WBC UA: ABNORMAL /HPF (ref 0–5)

## 2019-10-02 PROCEDURE — 6360000002 HC RX W HCPCS: Performed by: PHYSICIAN ASSISTANT

## 2019-10-02 PROCEDURE — 99284 EMERGENCY DEPT VISIT MOD MDM: CPT

## 2019-10-02 PROCEDURE — 87086 URINE CULTURE/COLONY COUNT: CPT

## 2019-10-02 PROCEDURE — 2580000003 HC RX 258: Performed by: PHYSICIAN ASSISTANT

## 2019-10-02 PROCEDURE — 85025 COMPLETE CBC W/AUTO DIFF WBC: CPT

## 2019-10-02 PROCEDURE — 80048 BASIC METABOLIC PNL TOTAL CA: CPT

## 2019-10-02 PROCEDURE — 74176 CT ABD & PELVIS W/O CONTRAST: CPT

## 2019-10-02 PROCEDURE — 87186 SC STD MICRODIL/AGAR DIL: CPT

## 2019-10-02 PROCEDURE — 87077 CULTURE AEROBIC IDENTIFY: CPT

## 2019-10-02 PROCEDURE — 96365 THER/PROPH/DIAG IV INF INIT: CPT

## 2019-10-02 PROCEDURE — 81003 URINALYSIS AUTO W/O SCOPE: CPT

## 2019-10-02 RX ORDER — CEPHALEXIN 500 MG/1
500 CAPSULE ORAL 4 TIMES DAILY
Qty: 28 CAPSULE | Refills: 0 | Status: SHIPPED | OUTPATIENT
Start: 2019-10-02 | End: 2019-10-07

## 2019-10-02 RX ORDER — KETOROLAC TROMETHAMINE 30 MG/ML
15 INJECTION, SOLUTION INTRAMUSCULAR; INTRAVENOUS ONCE
Status: DISCONTINUED | OUTPATIENT
Start: 2019-10-02 | End: 2019-10-03 | Stop reason: HOSPADM

## 2019-10-02 RX ORDER — ONDANSETRON 2 MG/ML
4 INJECTION INTRAMUSCULAR; INTRAVENOUS ONCE
Status: DISCONTINUED | OUTPATIENT
Start: 2019-10-02 | End: 2019-10-03 | Stop reason: HOSPADM

## 2019-10-02 RX ADMIN — DEXTROSE MONOHYDRATE 1 G: 5 INJECTION INTRAVENOUS at 22:45

## 2019-10-02 ASSESSMENT — PAIN DESCRIPTION - ORIENTATION: ORIENTATION: RIGHT

## 2019-10-02 ASSESSMENT — ENCOUNTER SYMPTOMS
BACK PAIN: 0
VOMITING: 0
ABDOMINAL PAIN: 1
SHORTNESS OF BREATH: 0
DIARRHEA: 1
NAUSEA: 0
COLOR CHANGE: 0

## 2019-10-02 ASSESSMENT — PAIN DESCRIPTION - PAIN TYPE: TYPE: ACUTE PAIN

## 2019-10-02 ASSESSMENT — PAIN DESCRIPTION - PROGRESSION: CLINICAL_PROGRESSION: GRADUALLY WORSENING

## 2019-10-02 ASSESSMENT — PAIN DESCRIPTION - ONSET: ONSET: PROGRESSIVE

## 2019-10-02 ASSESSMENT — PAIN SCALES - WONG BAKER: WONGBAKER_NUMERICALRESPONSE: 4

## 2019-10-02 ASSESSMENT — PAIN DESCRIPTION - FREQUENCY: FREQUENCY: CONTINUOUS

## 2019-10-02 ASSESSMENT — PAIN DESCRIPTION - LOCATION: LOCATION: FLANK

## 2019-10-02 ASSESSMENT — PAIN SCALES - GENERAL: PAINLEVEL_OUTOF10: 4

## 2019-10-02 ASSESSMENT — PAIN DESCRIPTION - DESCRIPTORS: DESCRIPTORS: DULL;THROBBING

## 2019-10-05 LAB
ORGANISM: ABNORMAL
URINE CULTURE, ROUTINE: ABNORMAL

## 2019-10-07 ENCOUNTER — OFFICE VISIT (OUTPATIENT)
Dept: FAMILY MEDICINE CLINIC | Age: 79
End: 2019-10-07
Payer: MEDICARE

## 2019-10-07 VITALS
HEART RATE: 64 BPM | OXYGEN SATURATION: 99 % | TEMPERATURE: 96.5 F | DIASTOLIC BLOOD PRESSURE: 66 MMHG | WEIGHT: 139.8 LBS | BODY MASS INDEX: 22.23 KG/M2 | RESPIRATION RATE: 12 BRPM | SYSTOLIC BLOOD PRESSURE: 113 MMHG

## 2019-10-07 DIAGNOSIS — E03.9 ACQUIRED HYPOTHYROIDISM: ICD-10-CM

## 2019-10-07 DIAGNOSIS — K21.9 GASTROESOPHAGEAL REFLUX DISEASE, ESOPHAGITIS PRESENCE NOT SPECIFIED: ICD-10-CM

## 2019-10-07 DIAGNOSIS — N28.9 RENAL INSUFFICIENCY, MILD: ICD-10-CM

## 2019-10-07 DIAGNOSIS — F41.1 ANXIETY STATE: ICD-10-CM

## 2019-10-07 DIAGNOSIS — E87.0 HYPERNATREMIA: ICD-10-CM

## 2019-10-07 DIAGNOSIS — Z23 NEED FOR INFLUENZA VACCINATION: Primary | ICD-10-CM

## 2019-10-07 DIAGNOSIS — N30.00 ACUTE CYSTITIS WITHOUT HEMATURIA: ICD-10-CM

## 2019-10-07 DIAGNOSIS — E78.00 PURE HYPERCHOLESTEROLEMIA: ICD-10-CM

## 2019-10-07 PROCEDURE — 90662 IIV NO PRSV INCREASED AG IM: CPT | Performed by: FAMILY MEDICINE

## 2019-10-07 PROCEDURE — G0008 ADMIN INFLUENZA VIRUS VAC: HCPCS | Performed by: FAMILY MEDICINE

## 2019-10-07 PROCEDURE — 99214 OFFICE O/P EST MOD 30 MIN: CPT | Performed by: FAMILY MEDICINE

## 2019-10-07 RX ORDER — LORAZEPAM 1 MG/1
1 TABLET ORAL 3 TIMES DAILY
Qty: 90 TABLET | Refills: 2 | Status: SHIPPED | OUTPATIENT
Start: 2019-10-07 | End: 2020-01-17 | Stop reason: SDUPTHER

## 2019-10-07 RX ORDER — FENOFIBRATE 134 MG/1
CAPSULE ORAL
Qty: 90 CAPSULE | Refills: 1 | Status: SHIPPED | OUTPATIENT
Start: 2019-10-07 | End: 2020-04-03 | Stop reason: SDUPTHER

## 2019-10-07 RX ORDER — OMEPRAZOLE 20 MG/1
CAPSULE, DELAYED RELEASE ORAL
Qty: 90 CAPSULE | Refills: 3 | Status: CANCELLED | OUTPATIENT
Start: 2019-10-07

## 2019-10-07 RX ORDER — OMEPRAZOLE 20 MG/1
CAPSULE, DELAYED RELEASE ORAL
Qty: 90 CAPSULE | Refills: 3 | Status: SHIPPED | OUTPATIENT
Start: 2019-10-07

## 2019-11-22 DIAGNOSIS — E87.0 HYPERNATREMIA: ICD-10-CM

## 2019-11-22 LAB
ANION GAP SERPL CALCULATED.3IONS-SCNC: 12 MMOL/L (ref 3–16)
BUN BLDV-MCNC: 20 MG/DL (ref 7–20)
CALCIUM SERPL-MCNC: 10.6 MG/DL (ref 8.3–10.6)
CHLORIDE BLD-SCNC: 103 MMOL/L (ref 99–110)
CO2: 27 MMOL/L (ref 21–32)
CREAT SERPL-MCNC: 1.1 MG/DL (ref 0.6–1.2)
GFR AFRICAN AMERICAN: 58
GFR NON-AFRICAN AMERICAN: 48
GLUCOSE BLD-MCNC: 106 MG/DL (ref 70–99)
POTASSIUM SERPL-SCNC: 4.5 MMOL/L (ref 3.5–5.1)
SODIUM BLD-SCNC: 142 MMOL/L (ref 136–145)

## 2020-01-06 NOTE — TELEPHONE ENCOUNTER
LM with  as per HIPAA. Pt needs an appointment for med refill.   Pt to call back tomorrow and schedule

## 2020-01-07 RX ORDER — LORAZEPAM 1 MG/1
TABLET ORAL
Qty: 90 TABLET | Refills: 0 | OUTPATIENT
Start: 2020-01-07 | End: 2020-02-06

## 2020-01-07 NOTE — TELEPHONE ENCOUNTER
Patient scheduled appointment for 1-17-19 and she thinks she does have enough medication to last her until then.  She will call back if she does not     [place]

## 2020-01-17 ENCOUNTER — NURSE TRIAGE (OUTPATIENT)
Dept: OTHER | Facility: CLINIC | Age: 80
End: 2020-01-17

## 2020-01-17 RX ORDER — LORAZEPAM 1 MG/1
1 TABLET ORAL 3 TIMES DAILY
Qty: 15 TABLET | Refills: 0 | Status: SHIPPED | OUTPATIENT
Start: 2020-01-17 | End: 2020-01-21 | Stop reason: SDUPTHER

## 2020-01-21 ENCOUNTER — TELEPHONE (OUTPATIENT)
Dept: FAMILY MEDICINE CLINIC | Age: 80
End: 2020-01-21

## 2020-01-21 DIAGNOSIS — F41.1 ANXIETY STATE: ICD-10-CM

## 2020-01-21 RX ORDER — LORAZEPAM 1 MG/1
1 TABLET ORAL 3 TIMES DAILY
Qty: 21 TABLET | Refills: 0 | Status: SHIPPED | OUTPATIENT
Start: 2020-01-21 | End: 2020-01-28 | Stop reason: SDUPTHER

## 2020-01-27 ENCOUNTER — TELEPHONE (OUTPATIENT)
Dept: FAMILY MEDICINE CLINIC | Age: 80
End: 2020-01-27

## 2020-01-27 NOTE — PROGRESS NOTES
Subjective:      Patient ID: Lorrie Tipton 70 y.o. female. The primary encounter diagnosis was Syncope, unspecified syncope type. A diagnosis of Anxiety state was also pertinent to this visit. HPI Haroldine Ormond was admitted to the hospital from 1/17/ - 19/2-. She was admitted after feeling light headed when she got out of the shower. Her  reported a brief syncopal episode. CBC, BMP and troponin were negative. No arrhythmia was identified. She was told she needs a echocardiogram.   This occurred again today. Both time she was standing up when she got out of the shower. She did not faint today. Both times she had had very little to eat or drink all day. Today she ate something when she felt whoozy and the sxs resolved. She has been 8 glasses of water a day. She is not drinking cola. She has been trying to wean her ativan. She feels very very anxious about not being able to take any. She has cut 1/2 tab BID and 1 at HS. Once in the past week she had to take extra house dose. Her family is very concerned that she sleeps 17 hours a day. This has been going on for months; she denies this but her  reports it. Gets up, takes her levothyroxine and then goes back to bed. She admits to being depressed and bored. Not suicidal.  She worries excessively. She has nothing to do all day except some housework. She does not like to cook and never cooks. She and her  eat take out or go out to eat. She is doing the exercises that PT taught her. She is not experiencing any vertigo. She complains of very itchy skin all in the chest, breast, back x few months. No rash. Outpatient Medications Marked as Taking for the 1/28/20 encounter (Office Visit) with Victoriano Garcia MD   Medication Sig Dispense Refill    Specialty Vitamins Products (ONE-A-DAY BONE STRENGTH PO) Take by mouth      LORazepam (ATIVAN) 1 MG tablet Take 1 tablet by mouth 3 times daily for 7 days.  21 tablet 0 2. Anxiety state  LORazepam (ATIVAN) 1 MG tablet  Wean to 1/2 tab BID and 1 at HS for now. Discussed gradually weaning further. 3. Pruritic dermatitis  hydrocortisone (HYTONE) 2.5 % lotion   4. At high risk for falls  Continue HEP from previous PT   5. Moderate episode of recurrent major depressive disorder (HCC)  Increase Lexapro 20 mg. Encouraged appt with Dr. Iris Sotelo.  She will consider. Plan:      Side effects of current medications reviewed and questions answered. Follow up in 2 weeks or prn. On the basis of positive falls risk screening, assessment and plan is as follows: continue HEP.

## 2020-01-28 ENCOUNTER — OFFICE VISIT (OUTPATIENT)
Dept: FAMILY MEDICINE CLINIC | Age: 80
End: 2020-01-28
Payer: MEDICARE

## 2020-01-28 VITALS
DIASTOLIC BLOOD PRESSURE: 64 MMHG | BODY MASS INDEX: 21.84 KG/M2 | OXYGEN SATURATION: 97 % | WEIGHT: 137.4 LBS | SYSTOLIC BLOOD PRESSURE: 128 MMHG | TEMPERATURE: 97.1 F | RESPIRATION RATE: 16 BRPM | HEART RATE: 80 BPM

## 2020-01-28 PROCEDURE — 99214 OFFICE O/P EST MOD 30 MIN: CPT | Performed by: FAMILY MEDICINE

## 2020-01-28 RX ORDER — LORAZEPAM 1 MG/1
TABLET ORAL
Qty: 60 TABLET | Refills: 2 | Status: SHIPPED | OUTPATIENT
Start: 2020-01-28 | End: 2020-04-03 | Stop reason: SDUPTHER

## 2020-01-28 RX ORDER — HYDROCORTISONE 25 MG/ML
LOTION TOPICAL 3 TIMES DAILY PRN
Qty: 59 ML | Refills: 2 | Status: SHIPPED | OUTPATIENT
Start: 2020-01-28 | End: 2021-07-05

## 2020-01-28 RX ORDER — ESCITALOPRAM OXALATE 20 MG/1
20 TABLET ORAL DAILY
Qty: 90 TABLET | Refills: 1 | Status: SHIPPED | OUTPATIENT
Start: 2020-01-28 | End: 2020-04-03

## 2020-01-28 ASSESSMENT — PATIENT HEALTH QUESTIONNAIRE - PHQ9
SUM OF ALL RESPONSES TO PHQ QUESTIONS 1-9: 6
1. LITTLE INTEREST OR PLEASURE IN DOING THINGS: 3
SUM OF ALL RESPONSES TO PHQ9 QUESTIONS 1 & 2: 6
2. FEELING DOWN, DEPRESSED OR HOPELESS: 3
SUM OF ALL RESPONSES TO PHQ QUESTIONS 1-9: 6

## 2020-04-03 ENCOUNTER — OFFICE VISIT (OUTPATIENT)
Dept: FAMILY MEDICINE CLINIC | Age: 80
End: 2020-04-03
Payer: MEDICARE

## 2020-04-03 VITALS
SYSTOLIC BLOOD PRESSURE: 125 MMHG | DIASTOLIC BLOOD PRESSURE: 76 MMHG | BODY MASS INDEX: 21.78 KG/M2 | TEMPERATURE: 97.8 F | HEART RATE: 66 BPM | WEIGHT: 137 LBS | OXYGEN SATURATION: 100 % | RESPIRATION RATE: 12 BRPM

## 2020-04-03 PROCEDURE — 99214 OFFICE O/P EST MOD 30 MIN: CPT | Performed by: FAMILY MEDICINE

## 2020-04-03 RX ORDER — ESCITALOPRAM OXALATE 10 MG/1
10 TABLET ORAL DAILY
Qty: 90 TABLET | Refills: 1 | Status: SHIPPED | OUTPATIENT
Start: 2020-04-03 | End: 2021-04-02

## 2020-04-03 RX ORDER — LORAZEPAM 1 MG/1
1 TABLET ORAL 3 TIMES DAILY PRN
Qty: 90 TABLET | Refills: 2 | Status: SHIPPED | OUTPATIENT
Start: 2020-04-17 | End: 2020-07-14

## 2020-04-03 RX ORDER — ONDANSETRON 4 MG/1
4 TABLET, ORALLY DISINTEGRATING ORAL EVERY 8 HOURS PRN
Qty: 30 TABLET | Refills: 5 | Status: SHIPPED | OUTPATIENT
Start: 2020-04-03 | End: 2021-06-29

## 2020-04-03 RX ORDER — FENOFIBRATE 134 MG/1
CAPSULE ORAL
Qty: 90 CAPSULE | Refills: 1 | Status: SHIPPED | OUTPATIENT
Start: 2020-04-03 | End: 2020-10-13 | Stop reason: SDUPTHER

## 2020-05-09 ENCOUNTER — NURSE TRIAGE (OUTPATIENT)
Dept: OTHER | Facility: CLINIC | Age: 80
End: 2020-05-09

## 2020-05-09 ENCOUNTER — TELEPHONE (OUTPATIENT)
Dept: FAMILY MEDICINE CLINIC | Age: 80
End: 2020-05-09

## 2020-07-15 ENCOUNTER — TELEPHONE (OUTPATIENT)
Dept: FAMILY MEDICINE CLINIC | Age: 80
End: 2020-07-15

## 2020-07-15 NOTE — TELEPHONE ENCOUNTER
Pt called back, and informed med[facility] OV is needed before next lorazepam refill. They state they will call back to schedule once they know their availability.

## 2020-07-15 NOTE — TELEPHONE ENCOUNTER
[facility] received a call from:    Name of Caller: lasha santos    Relationship to patient:self    Organization name:     Best contact number: [phone]    Reason for call: patient is returning call  Received on 7/15/20  Please leave a voicemail if no one

## 2020-08-05 ENCOUNTER — OFFICE VISIT (OUTPATIENT)
Dept: FAMILY MEDICINE CLINIC | Age: 80
End: 2020-08-05
Payer: MEDICARE

## 2020-08-05 VITALS
HEIGHT: 64 IN | HEART RATE: 71 BPM | RESPIRATION RATE: 12 BRPM | DIASTOLIC BLOOD PRESSURE: 64 MMHG | SYSTOLIC BLOOD PRESSURE: 122 MMHG | BODY MASS INDEX: 22.81 KG/M2 | TEMPERATURE: 97.6 F | WEIGHT: 133.6 LBS

## 2020-08-05 LAB
BILIRUBIN, POC: NEGATIVE
BLOOD URINE, POC: ABNORMAL
CLARITY, POC: ABNORMAL
COLOR, POC: YELLOW
GLUCOSE URINE, POC: NEGATIVE
KETONES, POC: NEGATIVE
LEUKOCYTE EST, POC: ABNORMAL
NITRITE, POC: NEGATIVE
PH, POC: 6.5
PROTEIN, POC: ABNORMAL
SPECIFIC GRAVITY, POC: 1.01
UROBILINOGEN, POC: 0.2

## 2020-08-05 PROCEDURE — 81002 URINALYSIS NONAUTO W/O SCOPE: CPT | Performed by: FAMILY MEDICINE

## 2020-08-05 PROCEDURE — 99214 OFFICE O/P EST MOD 30 MIN: CPT | Performed by: FAMILY MEDICINE

## 2020-08-05 RX ORDER — LORAZEPAM 1 MG/1
TABLET ORAL
Qty: 90 TABLET | Refills: 0 | Status: SHIPPED | OUTPATIENT
Start: 2020-08-20 | End: 2020-09-21

## 2020-08-05 RX ORDER — LEVOTHYROXINE SODIUM 88 UG/1
88 TABLET ORAL DAILY
Qty: 30 TABLET | Refills: 2 | Status: SHIPPED | OUTPATIENT
Start: 2020-08-05 | End: 2020-10-26

## 2020-08-05 NOTE — PROGRESS NOTES
Subjective:      Patient ID: Melinda Chandler 68 y.o. female. The primary encounter diagnosis was Urinary urgency. A diagnosis of Anxiety state was also pertinent to this visit. [facility]    Ban Martínez is here for follow up on anxiety. She has increased anxiety with COVID restrictions. She is staying home. Is not doing any exercise. She is eating well but not as much and weight is down 4 lbs. The Ativan is helping - she grinds her teeth less and is much less anxious with the Ativan. She is sleeping well. Occasionally has trouble falling to sleep but once asleep she stays asleep. Not suicidal.        IBS is stable. occ self limiting diarrhea. Rarely has constipation. occ urgency. She has no dysuria, hematuria. She has urinary frequency when she has diarrhea. No maddison or hematochezia. Denies abdominal pain. Hyperlipidemia:  No new myalgias or GI upset on fenofibrate (Tricor, Trilipix). Medication compliance: compliant most of the time. Patient is  following a low fat, low cholesterol diet. She is  exercising regularly.      Lab Results   Component Value Date    CHOL 190 04/30/2019    TRIG 81 04/30/2019    HDL 66 (H) 04/30/2019    LDLCALC 108 (H) 04/30/2019     Lab Results   Component Value Date    ALT 12 04/30/2019    AST 19 04/30/2019        Labs Renal Latest Ref Rng & Units 1/18/2020 11/22/2019 10/2/2019 7/3/2019 4/30/2019   BUN 7 - 25 mg/dL 20 20 16 19 21(H)   Cr 0.60 - 1.30 mg/dL 1.02 1.1 1.0 1.0 1.2   K 3.5 - 5.3 mmol/L 4.0 4.5 4.1 4.3 4.6   Na 133 - 146 mmol/L 143 142 153(H) 141 146(H)     TSH   Date Value Ref Range Status   07/03/2019 1.75 0.27 - 4.20 uIU/mL Final   04/30/2019 7.24 (H) 0.27 - 4.20 uIU/mL Final   12/07/2017 2.20 0.27 - 4.20 uIU/mL Final   04/21/2015 1.34 0.27 - 4.20 uIU/mL Final   11/19/2012 2.19 0.35 - 5.5 uIU/ml Final   10/01/2012 0.41 0.35 - 5.5 uIU/ml Final   02/13/2012 0.46 0.35 - 5.5 uIU/ml Final         Outpatient Medications Marked as Taking for the 8/5/20 encounter Zoloft [Sertraline Hcl] Nausea Only and Other (See Comments)     C/o nausea and headaches     Bactrim [Sulfamethoxazole-Trimethoprim] Rash    Biaxin [Clarithromycin] Nausea Only    Ciprofloxacin Rash     Pain under arms.  Erythromycin Nausea Only       Patient Active Problem List   Diagnosis    Esophageal reflux    Anxiety state    Pure hypercholesterolemia    Hypothyroidism    Hydronephrosis    IBS (irritable bowel syndrome)    TMJ arthralgia    Lumbar spinal stenosis    Patellofemoral arthritis    Perennial allergic rhinitis    Eczema    Renal insufficiency, mild    Kidney stone    Benign head tremor       Past Medical History:   Diagnosis Date    Anal pruritus 7/21/2015    Closed fracture of multiple ribs of right side with routine healing 2/68/9108    Diastolic dysfunction 6/86/5157    Falls 07/13/2018    Gastrocnemius muscle rupture 7/18/2016    Hemopneumothorax on right 8/23/2018    Lung nodules 8/7/2017     CT chest 2/2018 stable; no follow up needed    Postmenopausal atrophic vaginitis 11/14/2011    Pruritic disorder 9/27/2013    Seborrheic keratosis 5/15/2013    Shingles 9/18/2014       Past Surgical History:   Procedure Laterality Date    TONSILLECTOMY          Family History   Problem Relation Age of Onset    Thyroid Cancer Daughter 52       Social History     Tobacco Use    Smoking status: Never Smoker    Smokeless tobacco: Never Used   Substance Use Topics    Alcohol use: No    Drug use: No            Review of Systems  Review of Systems    Objective:   Physical Exam  Vitals:    08/05/20 1501 08/05/20 1542   BP: (!) 126/54 122/64   Pulse: 71    Resp: 12    Temp: 97.6 °F (36.4 °C)    TempSrc: Temporal    Weight: 133 lb 9.6 oz (60.6 kg)    Height: 5' 3.75\" (1.619 m)      Wt Readings from Last 3 Encounters:   08/05/20 133 lb 9.6 oz (60.6 kg)   04/03/20 137 lb (62.1 kg)   01/28/20 137 lb 6.4 oz (62.3 kg)       Physical Exam    NAD    Skin is warm and dry. No rash.  Well

## 2020-09-10 DIAGNOSIS — E78.00 PURE HYPERCHOLESTEROLEMIA: ICD-10-CM

## 2020-09-10 DIAGNOSIS — E03.9 ACQUIRED HYPOTHYROIDISM: ICD-10-CM

## 2020-09-10 LAB
A/G RATIO: 2.2 (ref 1.1–2.2)
ALBUMIN SERPL-MCNC: 4.1 G/DL (ref 3.4–5)
ALP BLD-CCNC: 58 U/L (ref 40–129)
ALT SERPL-CCNC: 13 U/L (ref 10–40)
ANION GAP SERPL CALCULATED.3IONS-SCNC: 10 MMOL/L (ref 3–16)
AST SERPL-CCNC: 23 U/L (ref 15–37)
BILIRUB SERPL-MCNC: 0.4 MG/DL (ref 0–1)
BUN BLDV-MCNC: 25 MG/DL (ref 7–20)
CALCIUM SERPL-MCNC: 10.1 MG/DL (ref 8.3–10.6)
CHLORIDE BLD-SCNC: 103 MMOL/L (ref 99–110)
CHOLESTEROL, TOTAL: 180 MG/DL (ref 0–199)
CO2: 29 MMOL/L (ref 21–32)
CREAT SERPL-MCNC: 1.2 MG/DL (ref 0.6–1.2)
GFR AFRICAN AMERICAN: 52
GFR NON-AFRICAN AMERICAN: 43
GLOBULIN: 1.9 G/DL
GLUCOSE BLD-MCNC: 96 MG/DL (ref 70–99)
HDLC SERPL-MCNC: 71 MG/DL (ref 40–60)
LDL CHOLESTEROL CALCULATED: 94 MG/DL
POTASSIUM SERPL-SCNC: 4.7 MMOL/L (ref 3.5–5.1)
SODIUM BLD-SCNC: 142 MMOL/L (ref 136–145)
TOTAL PROTEIN: 6 G/DL (ref 6.4–8.2)
TRIGL SERPL-MCNC: 73 MG/DL (ref 0–150)
TSH REFLEX: 0.54 UIU/ML (ref 0.27–4.2)
VLDLC SERPL CALC-MCNC: 15 MG/DL

## 2020-09-21 RX ORDER — LORAZEPAM 1 MG/1
TABLET ORAL
Qty: 90 TABLET | Refills: 1 | Status: SHIPPED | OUTPATIENT
Start: 2020-09-21 | End: 2020-12-02 | Stop reason: SDUPTHER

## 2020-09-21 NOTE — TELEPHONE ENCOUNTER
Requested Prescriptions     Pending Prescriptions Disp Refills    LORazepam (ATIVAN) 1 MG tablet [Pharmacy Med Name: LORAZEPAM 1 MG TABLET] 90 tablet 0     Sig: TAKE 1 TABLET BY MOUTH THREE TIMES A DAY AS NEEDED FOR ANXIETY     Gael Dao

## 2020-10-07 ENCOUNTER — TELEPHONE (OUTPATIENT)
Dept: FAMILY MEDICINE CLINIC | Age: 80
End: 2020-10-07

## 2020-10-07 NOTE — TELEPHONE ENCOUNTER
Patient calling today for lab results. She was given the results.      No changes for patient    Patient voiced understanding

## 2020-10-07 NOTE — TELEPHONE ENCOUNTER
Most sinus infections are caused by viruses and do not require antibx. If her sxs are present < 7-10 days she should use over-the-counter nasal spray such as Flonase.   If . 7-10 days schedule VV with me tomorrow (telephone is ok)

## 2020-10-07 NOTE — TELEPHONE ENCOUNTER
Pt has a sinus infection. Her cheeks are sore,pressure around the sinuses. Nose is stopped up. [facility] [address] [facility], [address] [place] [address] - F [phone]

## 2020-10-07 NOTE — TELEPHONE ENCOUNTER
Fever-no  SOB- no   Loss of taste - no   Body aches-no  Clear discharge with a little blood. Eyes are watery. Just sinus pressure and nasal congestion. Pt would like to know if you can send in an antibiotic for a sinus infection?

## 2020-10-13 RX ORDER — FENOFIBRATE 134 MG/1
CAPSULE ORAL
Qty: 90 CAPSULE | Refills: 1 | Status: SHIPPED | OUTPATIENT
Start: 2020-10-13 | End: 2021-05-06

## 2020-12-02 ENCOUNTER — TELEPHONE (OUTPATIENT)
Dept: FAMILY MEDICINE CLINIC | Age: 80
End: 2020-12-02

## 2020-12-02 RX ORDER — LORAZEPAM 1 MG/1
1 TABLET ORAL 3 TIMES DAILY
Qty: 21 TABLET | Refills: 0 | Status: SHIPPED | OUTPATIENT
Start: 2020-12-02 | End: 2020-12-09 | Stop reason: SDUPTHER

## 2020-12-02 NOTE — TELEPHONE ENCOUNTER
I will do it this time only. She needs to be sure to schedule every 3 months. This is required by law.

## 2020-12-02 NOTE — TELEPHONE ENCOUNTER
Pt scheduled for F/U 12/9/20  Please advise  Thank you     ----- Message from Rebecca Ontiveros sent at 12/2/2020  2:30 PM EST -----  Subject: Message to Provider    QUESTIONS  Information for Provider? Patient is needing LORazepam (ATIVAN) 1 MG   tablet patent is almost out and is really needing it. Patient is getting   very nervous about running out just wanted it refilled or enough to hold   over til appointment on the 9th.     ---------------------------------------------------------------------------  --------------  Toole Pilon INFO  What is the best way for the office to contact you? OK to leave message on   voicemail  Preferred Call Back Phone Number? [phone]  ---------------------------------------------------------------------------  --------------  SCRIPT ANSWERS  Relationship to Patient?  Self

## 2020-12-09 ENCOUNTER — OFFICE VISIT (OUTPATIENT)
Dept: FAMILY MEDICINE CLINIC | Age: 80
End: 2020-12-09
Payer: MEDICARE

## 2020-12-09 ENCOUNTER — TELEPHONE (OUTPATIENT)
Dept: FAMILY MEDICINE CLINIC | Age: 80
End: 2020-12-09

## 2020-12-09 VITALS
RESPIRATION RATE: 14 BRPM | WEIGHT: 136 LBS | SYSTOLIC BLOOD PRESSURE: 117 MMHG | TEMPERATURE: 96.9 F | BODY MASS INDEX: 23.53 KG/M2 | DIASTOLIC BLOOD PRESSURE: 62 MMHG | OXYGEN SATURATION: 99 % | HEART RATE: 75 BPM

## 2020-12-09 PROCEDURE — 3288F FALL RISK ASSESSMENT DOCD: CPT | Performed by: FAMILY MEDICINE

## 2020-12-09 PROCEDURE — 90662 IIV NO PRSV INCREASED AG IM: CPT | Performed by: FAMILY MEDICINE

## 2020-12-09 PROCEDURE — G0008 ADMIN INFLUENZA VIRUS VAC: HCPCS | Performed by: FAMILY MEDICINE

## 2020-12-09 PROCEDURE — 99214 OFFICE O/P EST MOD 30 MIN: CPT | Performed by: FAMILY MEDICINE

## 2020-12-09 RX ORDER — LORAZEPAM 1 MG/1
1 TABLET ORAL 3 TIMES DAILY
Qty: 90 TABLET | Refills: 2 | Status: SHIPPED | OUTPATIENT
Start: 2020-12-09 | End: 2021-03-17 | Stop reason: SDUPTHER

## 2020-12-09 NOTE — PROGRESS NOTES
Current Influenza vaccine VIS given to patient. Influenza consent form/questionnaire completed and signed. Patient responses to  Influenza consent form / questionnaire  reviewed. Vaccine given per protocol. Vaccine Information Sheet, \"Influenza - Inactivated\"  given to Dinesh Banerjee, or parent/legal guardian of  Dinesh Banerjee and verbalized understanding. Patient responses:    Have you ever had a reaction to a flu vaccine? No  Do you have any current illness? No  Have you ever had Guillian York Syndrome? No  Do you have a serious allergy to any of the follow: Neomycin, Polymyxin, Thimerosal, eggs or egg products? No    Flu vaccine given per order. Please see immunization tab. Risks and benefits explained. Current VIS given.       Immunizations Administered     Name Date Dose Route    Influenza, High-dose, Quadv, 65 yrs +, IM (Fluzone) 12/9/2020 0.7 mL Intramuscular    Site: Deltoid- Left    Lot: [id number]    NDC: 41114-720-24

## 2020-12-09 NOTE — TELEPHONE ENCOUNTER
I can refer her to PT for balance exercises. I think it is because she is so sedentary. Let me know if she wants to go.

## 2020-12-09 NOTE — PROGRESS NOTES
Subjective:      Patient ID: Rosalba Alvarado 68 y.o. female. The primary encounter diagnosis was Anxiety state. Diagnoses of Renal insufficiency, mild, Pure hypercholesterolemia, and Acquired hypothyroidism were also pertinent to this visit. HPI  FLU VACCINE!!!!!!!!!!!!!!!!!!!!!!!!!!!!    Jerry Harmon is here for follow up on chronic anxiety that she manages with Ativan. Attempts to wean have been unsuccessful. She has been anxious since childhood when she reports her mom was frequently taken to the hospital in the middle of the night for unclear medical problems. She notes an increase in anxiety with COVID and isolation. She is sleeping well and appetite is very good. She is not exercising. The Ativan is effective in helping her to manage the stress. She keeps medication secure and takes only as directed. She drinks no alcohol. Thyroid: Patient presents for evaluation of hypothyroidism. Current symptoms include denies weight changes, heat/cold intolerance, bowel/skin changes or CVS symptoms. Hyperlipidemia:  No new myalgias or GI upset on fenofibrate (Tricor, Trilipix). Medication compliance: compliant most of the time. Patient is  following a low fat, low cholesterol diet. She is  exercising regularly.      Lab Results   Component Value Date    CHOL 180 09/10/2020    TRIG 73 09/10/2020    HDL 71 (H) 09/10/2020    LDLCALC 94 09/10/2020     Lab Results   Component Value Date    ALT 13 09/10/2020    AST 23 09/10/2020        Labs Renal Latest Ref Rng & Units 9/10/2020 1/18/2020 11/22/2019 10/2/2019 7/3/2019   BUN 7 - 20 mg/dL 25(H) 20 20 16 19   Cr 0.6 - 1.2 mg/dL 1.2 1.02 1.1 1.0 1.0   K 3.5 - 5.1 mmol/L 4.7 4.0 4.5 4.1 4.3   Na 136 - 145 mmol/L 142 143 142 153(H) 141     TSH   Date Value Ref Range Status   09/10/2020 0.54 0.27 - 4.20 uIU/mL Final   07/03/2019 1.75 0.27 - 4.20 uIU/mL Final   04/30/2019 7.24 (H) 0.27 - 4.20 uIU/mL Final   04/21/2015 1.34 0.27 - 4.20 uIU/mL Final   11/19/2012 2.19 0.35 - 5.5 uIU/ml Final   10/01/2012 0.41 0.35 - 5.5 uIU/ml Final   02/13/2012 0.46 0.35 - 5.5 uIU/ml Final     Lab Results   Component Value Date    LABA1C 5.2 04/21/2015     Lab Results   Component Value Date    .5 04/21/2015          Outpatient Medications Marked as Taking for the 12/9/20 encounter (Office Visit) with April Reynolds MD   Medication Sig Dispense Refill    ZINC PO Take 1 tablet by mouth daily      LORazepam (ATIVAN) 1 MG tablet Take 1 tablet by mouth 3 times daily for 7 days. 21 tablet 0    levothyroxine (SYNTHROID) 88 MCG tablet TAKE 1 TABLET BY MOUTH EVERY DAY 90 tablet 3    fenofibrate micronized (LOFIBRA) 134 MG capsule TAKE 1 CAPSULE BY MOUTH EVERY DAY IN THE MORNING BEFORE BREAKFAST 90 capsule 1    Fexofenadine HCl (ALLEGRA PO) Take by mouth      ondansetron (ZOFRAN ODT) 4 MG disintegrating tablet Take 1 tablet by mouth every 8 hours as needed for Nausea or Vomiting 30 tablet 5    escitalopram (LEXAPRO) 10 MG tablet Take 1 tablet by mouth daily 90 tablet 1    Specialty Vitamins Products (ONE-A-DAY BONE STRENGTH PO) Take by mouth      hydrocortisone (HYTONE) 2.5 % lotion Apply topically 3 times daily as needed (itching) Apply topically 2 times daily. 59 mL 2    AMOXICILLIN PO Take by mouth      omeprazole (PRILOSEC) 20 MG delayed release capsule TAKE 1 CAPSULE BY MOUTH DAILY 90 capsule 3    Probiotic Product (PROBIOTIC PO) Take by mouth      mometasone (ELOCON) 0.1 % cream Apply topically daily Apply topically daily. 1 Tube 2    acetaminophen (TYLENOL) 325 MG tablet Take 975 mg by mouth every 8 hours as needed for Pain      vitamin C (ASCORBIC ACID) 500 MG tablet Take 500 mg by mouth daily      vitamin D (CHOLECALCIFEROL) 1000 UNIT TABS tablet Take 2,000 Units by mouth daily       Coenzyme Q10-Fish Oil-Vit E (CO-Q 10 OMEGA-3 FISH OIL) CAPS Take 1 capsule by mouth daily.  Magnesium 100 MG CAPS Take 1 tablet by mouth daily.       Capsicum, Cayenne, (CAYENNE PEPPER PO) Take  by mouth.  Nutritional Supplements (JUICE PLUS FIBRE PO) Take 1 tablet by mouth daily.  Calcium & Magnesium Carbonates 311-232 MG CAPS Take 1 tablet by mouth 2 times daily. Allergies   Allergen Reactions    Biaxin [Clarithromycin]     Erythromycin     Lescol     Lipitor     Macrodantin [Nitrofurantoin]     Paxil [Paroxetine]     Zoloft [Sertraline Hcl] Nausea Only and Other (See Comments)     C/o nausea and headaches     Bactrim [Sulfamethoxazole-Trimethoprim] Rash    Biaxin [Clarithromycin] Nausea Only    Ciprofloxacin Rash     Pain under arms.     Erythromycin Nausea Only       Patient Active Problem List   Diagnosis    Esophageal reflux    Anxiety state    Pure hypercholesterolemia    Hypothyroidism    Hydronephrosis    IBS (irritable bowel syndrome)    TMJ arthralgia    Lumbar spinal stenosis    Patellofemoral arthritis    Perennial allergic rhinitis    Eczema    Renal insufficiency, mild    Kidney stone    Benign head tremor       Past Medical History:   Diagnosis Date    Anal pruritus 7/21/2015    Closed fracture of multiple ribs of right side with routine healing 2/15/9957    Diastolic dysfunction 2/90/8609    Falls 07/13/2018    Gastrocnemius muscle rupture 7/18/2016    Hemopneumothorax on right 8/23/2018    Lung nodules 8/7/2017     CT chest 2/2018 stable; no follow up needed    Postmenopausal atrophic vaginitis 11/14/2011    Pruritic disorder 9/27/2013    Seborrheic keratosis 5/15/2013    Shingles 9/18/2014       Past Surgical History:   Procedure Laterality Date    TONSILLECTOMY          Family History   Problem Relation Age of Onset    Thyroid Cancer Daughter 52       Social History     Tobacco Use    Smoking status: Never Smoker    Smokeless tobacco: Never Used   Substance Use Topics    Alcohol use: No    Drug use: No            Review of Systems  Review of Systems    Objective:   Physical Exam  Vitals:    12/09/20 1334   BP: 117/62 Pulse: 75   Resp: 14   Temp: 96.9 °F (36.1 °C)   TempSrc: Temporal   SpO2: 99%   Weight: 136 lb (61.7 kg)     Wt Readings from Last 3 Encounters:   12/09/20 136 lb (61.7 kg)   08/05/20 133 lb 9.6 oz (60.6 kg)   04/03/20 137 lb (62.1 kg)        Physical Exam  NAD  Skin is warm and dry. Mood and affect are mildly anxious. No agitation or psychomotor retardation. No pressured speech, grandiosity or tangential thoughts. Insight and judgement are intact. Not suicidal.   The neck is supple and free of adenopathy or masses, the thyroid is normal without enlargement or nodules. Chest is clear, no wheezing or rales. Normal symmetric air entry throughout both lung fields. Heart regular with normal rate, no murmer or gallop       Assessment:       Diagnosis Orders   1. Anxiety state  LORazepam (ATIVAN) 1 MG tablet  PDMP reviewed and no concerns noted. Non-pharmacologic treatment including meditation and exercise addressed. 2. Renal insufficiency, mild  continue to avoid nephrotoxins and maintain hydration      3. Pure hypercholesterolemia  LDL at goal < 130  Tolerating Tricor   4. Acquired hypothyroidism  Clinically euthyroid. TSH annually          Plan:      Side effects of current medications reviewed and questions answered. Follow up in 3 months or prn.

## 2021-02-23 ENCOUNTER — TELEPHONE (OUTPATIENT)
Dept: FAMILY MEDICINE CLINIC | Age: 81
End: 2021-02-23

## 2021-02-23 NOTE — TELEPHONE ENCOUNTER
Ambar Berger,  from [facility] called, requesting a referral for the pt to see  as a new patient. Is  accepting new patients? Please advise. Thanks.           Ambar Berger: [phone]

## 2021-03-03 ENCOUNTER — TELEPHONE (OUTPATIENT)
Dept: FAMILY MEDICINE CLINIC | Age: 81
End: 2021-03-03

## 2021-03-03 NOTE — TELEPHONE ENCOUNTER
Neisha Mccormack from [facility] called, stating that the pt was discharged from [facility] to back home. She is requesting a verbal order for skilled nursing and PT/OT services. Please call the director of Nursing: [facility] at [phone]. She is scheduled for an in office visit 3/10/2021. Please advise. Thanks.  (Written by Myron Melendez)

## 2021-03-04 ENCOUNTER — TELEPHONE (OUTPATIENT)
Dept: FAMILY MEDICINE CLINIC | Age: 81
End: 2021-03-04

## 2021-03-04 NOTE — TELEPHONE ENCOUNTER
Patient was placed on amlodipine 5 mg 1 tablet daily during her recent hospital stay. Should she continue this now that she is home? Please reach out to patient at # [phone].

## 2021-03-05 ENCOUNTER — TELEPHONE (OUTPATIENT)
Dept: FAMILY MEDICINE CLINIC | Age: 81
End: 2021-03-05

## 2021-03-05 NOTE — TELEPHONE ENCOUNTER
Pt called, stating she will call back to schedule for . She states she was not aware of her early morning VV with  on 3/5. She also request that the VV be over the phone because she has no camera phone. Please advise. Thanks.  (Written by Jessie Harrell)

## 2021-03-08 ENCOUNTER — TELEPHONE (OUTPATIENT)
Dept: FAMILY MEDICINE CLINIC | Age: 81
End: 2021-03-08

## 2021-03-08 NOTE — TELEPHONE ENCOUNTER
Please advise   LOV-12/9/2020  LL-2/22/2021  3/17/2021 upcoming apt    **bp at last office visit - (12/9/2020)117/62  (8/5/2020) - 122/64

## 2021-03-08 NOTE — TELEPHONE ENCOUNTER
Patient states her nurse was there today and took her BP and it was 118/63, patient states she took only 1/2 blood pressure tab yesterday and wanted to know will she still need to take this medication with her BP being at a regular level. She have not took any today. Patients said is okay to leave response on her voicemail. Please advise. Thanks.    [phone]

## 2021-03-09 ENCOUNTER — TELEPHONE (OUTPATIENT)
Dept: FAMILY MEDICINE CLINIC | Age: 81
End: 2021-03-09

## 2021-03-09 RX ORDER — LISINOPRIL 5 MG/1
5 TABLET ORAL DAILY
Qty: 30 TABLET | Refills: 2 | Status: SHIPPED | OUTPATIENT
Start: 2021-03-09 | End: 2021-03-17

## 2021-03-09 NOTE — TELEPHONE ENCOUNTER
Patient states she is taking amlodipine. Patient states since she started taking it she is unable to walk straight and is feeling dizzy. Pharmacist thinks she might be allergic to it. Please contact patient as soon as possible at # [phone].

## 2021-03-09 NOTE — TELEPHONE ENCOUNTER
Oneda Quick. She can stop taking it and start Lisinopril instead. Tell I will put her on a very low dose. If it causes a cough she should let me know.

## 2021-03-10 ENCOUNTER — TELEPHONE (OUTPATIENT)
Dept: FAMILY MEDICINE CLINIC | Age: 81
End: 2021-03-10

## 2021-03-10 NOTE — TELEPHONE ENCOUNTER
/63 a few days ago by a visiting nurse    She will be having a nurse come tomorrow to check her reading and she will have PT on Friday who will check her BP as well. Patient would rather not start this medication (lisinopril) at this time. She would like to see what her readings are in the next week.      Please advise      Please have someone call her either way  [phone] (home)

## 2021-03-10 NOTE — TELEPHONE ENCOUNTER
Pt states that she stopped taking the amlodipine last week. She does not remember the exact date. She also states that she does not want to take the new medication yet because the new medication makes her dizzy, and gives her a cough. She states that she is already having some sinus problems and she does not want the new medication to make her sinuses worse. She asks that the office give her call back tomorrow morning. Please advise. Thanks.  (Written by Reynold Solorio)

## 2021-03-17 ENCOUNTER — OFFICE VISIT (OUTPATIENT)
Dept: FAMILY MEDICINE CLINIC | Age: 81
End: 2021-03-17
Payer: MEDICARE

## 2021-03-17 VITALS
DIASTOLIC BLOOD PRESSURE: 74 MMHG | HEART RATE: 78 BPM | RESPIRATION RATE: 14 BRPM | BODY MASS INDEX: 23.01 KG/M2 | SYSTOLIC BLOOD PRESSURE: 134 MMHG | WEIGHT: 133 LBS | TEMPERATURE: 97 F | OXYGEN SATURATION: 97 %

## 2021-03-17 DIAGNOSIS — F33.1 MODERATE EPISODE OF RECURRENT MAJOR DEPRESSIVE DISORDER (HCC): ICD-10-CM

## 2021-03-17 DIAGNOSIS — F41.1 ANXIETY STATE: Primary | ICD-10-CM

## 2021-03-17 DIAGNOSIS — R55 PRE-SYNCOPE: ICD-10-CM

## 2021-03-17 DIAGNOSIS — N30.00 ACUTE CYSTITIS WITHOUT HEMATURIA: ICD-10-CM

## 2021-03-17 DIAGNOSIS — N28.9 RENAL INSUFFICIENCY, MILD: ICD-10-CM

## 2021-03-17 PROCEDURE — 99214 OFFICE O/P EST MOD 30 MIN: CPT | Performed by: FAMILY MEDICINE

## 2021-03-17 RX ORDER — LORAZEPAM 0.5 MG/1
TABLET ORAL
Qty: 90 TABLET | Refills: 2 | Status: SHIPPED | OUTPATIENT
Start: 2021-03-17 | End: 2021-05-02 | Stop reason: DRUGHIGH

## 2021-03-17 RX ORDER — ESCITALOPRAM OXALATE 10 MG/1
10 TABLET ORAL DAILY
Qty: 90 TABLET | Refills: 1 | Status: CANCELLED | OUTPATIENT
Start: 2021-03-17

## 2021-03-17 NOTE — PROGRESS NOTES
Subjective:      Patient ID: Terence Snellen 80 y.o. female. The primary encounter diagnosis was Anxiety state. Diagnoses of Pre-syncope, Renal insufficiency, mild, and Acute cystitis without hematuria were also pertinent to this visit. HPI  Renea Malin is here for follow up after hospitalization for pre-syncopal event that occurred while brushing her hair. Per ER notes her dter states she is spending a lot of time in bed and is very inactive. She was found to have a UTI. Ativan dose was decreased. She was discharged to the [facility] for rehab. She did not have a good experience. She was there for a week. She has home health and is seeing PT. She feels she is almost back to normal.     Renal insufficiency:      CAMI: long hx of CAMI. Her mother was ill or a hypochondriac when she was little and was often taken to the hospital in the middle of the night. Renea Malin remembers always being on edge her whole life. She saw Dr. Brian Narayanan briefly; she did not find it helpful. She has declined referral to another therapist.   She still feels very nervous all the time. Does not feel depressed. Is sleeping well. Appetite is very good. She is taking Ativan 0.5 mg once a day and 2 tabs at HS. She is taking a second 0.5 mg during the day every other day. Hypertension: Patient here for follow-up of elevated blood pressure. She is exercising and is adherent to low salt diet. Blood pressure is well controlled at home. 120/70s. Cardiac symptoms none. Patient denies chest pressure/discomfort, exertional chest pressure/discomfort, lower extremity edema, palpitations and paroxysmal nocturnal dyspnea. Cardiovascular risk factors: advanced age (older than 54 for men, 72 for women). Use of agents associated with hypertension: none. History of target organ damage: none.      Labs Renal Latest Ref Rng & Units 2/21/2021 9/10/2020 1/18/2020 11/22/2019 10/2/2019   BUN 7 - 25 mg/dL 23 25(H) 20 20 16   Cr 0.60 - 1.30 mg/dL 1.06 1.2 1. 02 1.1 1.0   K 3.5 - 5.3 mmol/L 3.9 4.7 4.0 4.5 4.1   Na 133 - 146 mmol/L 141 142 143 142 153(H)        TSH   Date Value Ref Range Status   09/10/2020 0.54 0.27 - 4.20 uIU/mL Final   07/03/2019 1.75 0.27 - 4.20 uIU/mL Final   04/30/2019 7.24 (H) 0.27 - 4.20 uIU/mL Final   04/21/2015 1.34 0.27 - 4.20 uIU/mL Final   11/19/2012 2.19 0.35 - 5.5 uIU/ml Final   10/01/2012 0.41 0.35 - 5.5 uIU/ml Final   02/13/2012 0.46 0.35 - 5.5 uIU/ml Final     TSH, 3RD GENERATION   Date Value Ref Range Status   02/21/2021 3.82 0.45 - 4.12 uIU/mL Final      Lab Results   Component Value Date     02/21/2021    K 3.9 02/21/2021     02/21/2021    CO2 24 02/21/2021    BUN 23 02/21/2021    CREATININE 1.06 02/21/2021    GLUCOSE 86 02/21/2021    CALCIUM 9.0 02/21/2021    PROT 6.0 (L) 09/10/2020    LABALBU 4.1 09/10/2020    BILITOT 0.4 09/10/2020    ALKPHOS 58 09/10/2020    AST 23 09/10/2020    ALT 13 09/10/2020    LABGLOM 43 (A) 09/10/2020    GFRAA 52 (A) 09/10/2020    AGRATIO 2.2 09/10/2020    GLOB 1.9 09/10/2020        Lab Results   Component Value Date    WBC 6.5 02/21/2021    HGB 12.0 02/21/2021    HCT 36.7 02/21/2021    MCV 86.3 02/21/2021     02/21/2021     TSH   Date Value Ref Range Status   09/10/2020 0.54 0.27 - 4.20 uIU/mL Final   07/03/2019 1.75 0.27 - 4.20 uIU/mL Final   04/30/2019 7.24 (H) 0.27 - 4.20 uIU/mL Final   04/21/2015 1.34 0.27 - 4.20 uIU/mL Final   11/19/2012 2.19 0.35 - 5.5 uIU/ml Final   10/01/2012 0.41 0.35 - 5.5 uIU/ml Final   02/13/2012 0.46 0.35 - 5.5 uIU/ml Final     TSH, 3RD GENERATION   Date Value Ref Range Status   02/21/2021 3.82 0.45 - 4.12 uIU/mL Final           Outpatient Medications Marked as Taking for the 3/17/21 encounter (Office Visit) with Olga Lidia Mendoza MD   Medication Sig Dispense Refill    ZINC PO Take 1 tablet by mouth daily      LORazepam (ATIVAN) 1 MG tablet Take 1 tablet by mouth 3 times daily for 90 days.  90 tablet 2    levothyroxine (SYNTHROID) 88 MCG tablet TAKE 1 throughout both lung fields. Heart regular with normal rate, no murmer or gallop     Ext no c/c/e    Assessment:       Diagnosis Orders   1. Anxiety state  LORazepam (ATIVAN) 0.5 MG tablet  Decrease dose. Risk addressed   2. Pre-syncope  Advised to push fluids    3. Renal insufficiency, mild  Improved  continue to avoid nephrotoxins and maintain hydration     4. Acute cystitis without hematuria  Resolved  Prevention of UTIs discussed: drink plenty of fluids, avoid kimberly, avoid holding urine, double void, empty bladder before or after intercourse, eat yogurt on a daily basis. 5. Moderate episode of recurrent major depressive disorder (HCC)  Continue Lexapro. Plan:      Side effects of current medications reviewed and questions answered. Follow up in 3 months or prn.

## 2021-03-19 DIAGNOSIS — F41.1 ANXIETY STATE: ICD-10-CM

## 2021-03-19 RX ORDER — LORAZEPAM 1 MG/1
1 TABLET ORAL 3 TIMES DAILY
Qty: 90 TABLET | Refills: 2 | Status: CANCELLED | OUTPATIENT
Start: 2021-03-19 | End: 2021-06-17

## 2021-03-19 NOTE — TELEPHONE ENCOUNTER
Pt called in very distressed about her medication and stating that she does not do well with change if she can help it. States while in the assisted living they found the perfect regimen for her medication and wanted to keep it this way. States that she just cant sleep without it and needs sometimes during the day. States she feels too nervous without the dose she is currently on, did not  new dose as it was not discussed. Medication:    LORazepam (ATIVAN) 1 MG tablet [[id number]]  DISCONTINUED    Order Details  Dose: 1 mg Route: Oral Frequency: 3 TIMES DAILY   Dispense Quantity: 90 tablet Refills: 2          Sig: Take 1 tablet by mouth 3 times daily for 90 days. Patient taking differently: Take 1 mg by mouth 3 times daily. Take 0.5 BID and 1 mg at HS         Start Date: 12/09/20 End Date: 03/17/21 (ordered for 270 doses)   Discontinued by: Elmer Pierson MD on 3/17/2021 15:36   Reason: [address] [phone] - F [phone]      Last OV: 3/17/2021  Last Labs: 2/22/2021    PT requesting a call once completed.      Please advise, thanks

## 2021-03-19 NOTE — TELEPHONE ENCOUNTER
I want her to try the lower dose for 2 days. She should let me know how she does on the lower dose. It will be much safer for her to take less medication.  I don't want her to fall and break something

## 2021-03-22 ENCOUNTER — TELEPHONE (OUTPATIENT)
Dept: FAMILY MEDICINE CLINIC | Age: 81
End: 2021-03-22

## 2021-03-23 NOTE — TELEPHONE ENCOUNTER
Patient calling back today asking for a new prescription. She states she tried to take the 0.5mg ativan last evening and she was up all night. Patient is quite panicky and would like to go to the previous prescription of 1mg of ativan hs and 0.5mg bid. Patient would like a return phone call when completed.

## 2021-03-23 NOTE — TELEPHONE ENCOUNTER
Patient calling for nurse. Asked if it was regarding lorazepam and patient states she just needs to speak with the nurse.  Transferred call to [facility].

## 2021-03-23 NOTE — TELEPHONE ENCOUNTER
She can go back to 1 mg at HS ( she can take 2 of the 0.5) but I want her to stay on 1/2 of the 0.5 during the day.

## 2021-04-02 DIAGNOSIS — F41.1 ANXIETY STATE: ICD-10-CM

## 2021-04-02 DIAGNOSIS — F33.1 MODERATE EPISODE OF RECURRENT MAJOR DEPRESSIVE DISORDER (HCC): ICD-10-CM

## 2021-04-02 RX ORDER — ESCITALOPRAM OXALATE 10 MG/1
TABLET ORAL
Qty: 90 TABLET | Refills: 1 | Status: SHIPPED | OUTPATIENT
Start: 2021-04-02 | End: 2021-04-30 | Stop reason: ALTCHOICE

## 2021-04-26 ENCOUNTER — TELEPHONE (OUTPATIENT)
Dept: FAMILY MEDICINE CLINIC | Age: 81
End: 2021-04-26

## 2021-04-26 DIAGNOSIS — F41.1 ANXIETY STATE: Primary | ICD-10-CM

## 2021-04-26 RX ORDER — BUSPIRONE HYDROCHLORIDE 5 MG/1
5 TABLET ORAL 2 TIMES DAILY
Qty: 60 TABLET | Refills: 0 | Status: SHIPPED | OUTPATIENT
Start: 2021-04-26 | End: 2021-05-02 | Stop reason: SINTOL

## 2021-04-26 NOTE — TELEPHONE ENCOUNTER
Spoke to Macario Redding.  She states referral for  and skilled needs to be faxed to [phone]    Referral fax completed by Katarzyna Lee

## 2021-04-26 NOTE — TELEPHONE ENCOUNTER
Pt's  called, stating that the pt sleeps 19 hours a day, sometimes straight through the day. Pt's  states that the pt says she does not want to live. Pt's  is requesting if there is a way to have someone come to their home and assess the pt to see what is her exact condition. Pt's  states that he does not know how to do a VV and the pt struggles to walk to make it in the office for an appointment. Pt's  is worried. Please advise. Thanks.

## 2021-04-26 NOTE — TELEPHONE ENCOUNTER
Aubrey Hall from [email or web address] called back, stating that the pt needs more than just social work needs in order for [email or web address] to take pt on as a client. Please advise. Thanks.       Aubrey Hall: [phone]

## 2021-04-28 ENCOUNTER — TELEPHONE (OUTPATIENT)
Dept: FAMILY MEDICINE CLINIC | Age: 81
End: 2021-04-28

## 2021-04-29 ENCOUNTER — TELEPHONE (OUTPATIENT)
Dept: FAMILY MEDICINE CLINIC | Age: 81
End: 2021-04-29

## 2021-04-29 NOTE — TELEPHONE ENCOUNTER
Beba called back stating she spoke with Jackie's spouse and daughter and it does not sound like she needs skilled nursing. Jai Marques thinks she needs a psych evaluation and [facility] does not do psych evaluations. [facility] They have a psych nurse on staff that can to an in home evaluation. Beba did not have their contact information. Please advise. Thanks.

## 2021-04-29 NOTE — TELEPHONE ENCOUNTER
Beba from [email or web address] called stating she received a referral for home health services for Mitul. She has not been able to reach [facility]. Her home phone ([phone]) has been busy for over an hour, and her mobile phone ([phone]) is screening calls. She wanted to make Dr. Tameka Mauricio aware.         Shonna Parents [phone]

## 2021-04-30 ENCOUNTER — OFFICE VISIT (OUTPATIENT)
Dept: FAMILY MEDICINE CLINIC | Age: 81
End: 2021-04-30
Payer: MEDICARE

## 2021-04-30 DIAGNOSIS — F41.1 ANXIETY STATE: ICD-10-CM

## 2021-04-30 DIAGNOSIS — F33.2 SEVERE EPISODE OF RECURRENT MAJOR DEPRESSIVE DISORDER, WITHOUT PSYCHOTIC FEATURES (HCC): Primary | ICD-10-CM

## 2021-04-30 DIAGNOSIS — Z91.81 AT HIGH RISK FOR FALLS: ICD-10-CM

## 2021-04-30 PROCEDURE — 99213 OFFICE O/P EST LOW 20 MIN: CPT | Performed by: FAMILY MEDICINE

## 2021-04-30 RX ORDER — FLUOXETINE HYDROCHLORIDE 20 MG/1
20 CAPSULE ORAL DAILY
Qty: 30 CAPSULE | Refills: 2 | Status: SHIPPED | OUTPATIENT
Start: 2021-04-30 | End: 2021-05-02 | Stop reason: SINTOL

## 2021-05-02 ENCOUNTER — TELEPHONE (OUTPATIENT)
Dept: FAMILY MEDICINE CLINIC | Age: 81
End: 2021-05-02

## 2021-05-02 VITALS — HEART RATE: 82 BPM

## 2021-05-02 DIAGNOSIS — F41.1 ANXIETY STATE: ICD-10-CM

## 2021-05-02 DIAGNOSIS — F33.1 MODERATE EPISODE OF RECURRENT MAJOR DEPRESSIVE DISORDER (HCC): ICD-10-CM

## 2021-05-02 PROBLEM — F33.2 SEVERE EPISODE OF RECURRENT MAJOR DEPRESSIVE DISORDER, WITHOUT PSYCHOTIC FEATURES (HCC): Status: ACTIVE | Noted: 2021-05-02

## 2021-05-02 RX ORDER — LORAZEPAM 0.5 MG/1
TABLET ORAL
Qty: 90 TABLET | Refills: 2 | Status: SHIPPED
Start: 2021-05-02 | End: 2021-05-11 | Stop reason: SDUPTHER

## 2021-05-02 RX ORDER — ESCITALOPRAM OXALATE 10 MG/1
TABLET ORAL
Qty: 90 TABLET | Refills: 1
Start: 2021-05-02 | End: 2021-09-21 | Stop reason: SDUPTHER

## 2021-05-02 NOTE — PROGRESS NOTES
Subjective:      Patient ID: Nash Wiggins 80 y.o. female. is here for evaluation for depression      HPI    Kristy Olsen is seen in her home for severe depression. She has severe chronic anxiety and has not followed through with recommendations to see a therapist.  She saw Dr. Juhi Louis once. She saw a psychiatrist years ago; her family thinks she did not tell him the truth. A few years ago she recalled to me that she often laid awake at night as a child worrying about her mother who had frequent middle of the night ER trips for unknown illness. Her  notified the office a few days ago that she is spending up to 19 hours a day in bed. She and her  have eating out every night for 40 years. She gets out of bed, puts a coat on over her PJs and goes with him to  take out. She is eating very little, small meals 2 times a day. Does bath. She is drinking fluids. She admits to being depressed. She does not care if she dies but is not suicidal.  Stressors include COVID isolation. She lives with her . Her dter, son in law and grandson visit. Her depression worsened after hospitalization for a fall in February. It was noted at that time she was spending a lot of time in bed. Her Ativan dose was decreased to 0.5 mg BID and 1 mg at HS. She was discharged to the [facility] for PT and she did well, needing only a short stay. Her family notes she will put on her best effort just to get home. Once she is home, she reverts to previous habits. Since home form the [facility] [place] she has had no falls.       Outpatient Medications Marked as Taking for the 4/30/21 encounter (Office Visit) with Katherine Tompkins MD   Medication Sig Dispense Refill    busPIRone (BUSPAR) 5 MG tablet Take 1 tablet by mouth 2 times daily 60 tablet 0    LORazepam (ATIVAN) 0.5 MG tablet 1/2 tab BID prn and 1 at HS (Patient taking differently: 1 tab BID prn and 2 at HS) 90 tablet 2    levothyroxine (SYNTHROID) 88 MCG tablet TAKE 1 TABLET BY MOUTH EVERY DAY 90 tablet 3    fenofibrate micronized (LOFIBRA) 134 MG capsule TAKE 1 CAPSULE BY MOUTH EVERY DAY IN THE MORNING BEFORE BREAKFAST 90 capsule 1    omeprazole (PRILOSEC) 20 MG delayed release capsule TAKE 1 CAPSULE BY MOUTH DAILY 90 capsule 3   Lexapro  10 mg po qd. Allergies   Allergen Reactions    Biaxin [Clarithromycin]     Erythromycin     Lescol     Lipitor     Macrodantin [Nitrofurantoin]     Paxil [Paroxetine]     Zoloft [Sertraline Hcl] Nausea Only and Other (See Comments)     C/o nausea and headaches     Bactrim [Sulfamethoxazole-Trimethoprim] Rash    Biaxin [Clarithromycin] Nausea Only    Ciprofloxacin Rash     Pain under arms.     Erythromycin Nausea Only       Patient Active Problem List   Diagnosis    Esophageal reflux    Anxiety state    Pure hypercholesterolemia    Hypothyroidism    Hydronephrosis    IBS (irritable bowel syndrome)    TMJ arthralgia    Lumbar spinal stenosis    Patellofemoral arthritis    Perennial allergic rhinitis    Eczema    Renal insufficiency, mild    Kidney stone    Benign head tremor       Past Medical History:   Diagnosis Date    Anal pruritus 7/21/2015    Closed fracture of multiple ribs of right side with routine healing 5/16/1390    Diastolic dysfunction 6/31/0804    Falls 07/13/2018    Gastrocnemius muscle rupture 7/18/2016    Hemopneumothorax on right 8/23/2018    Lung nodules 8/7/2017     CT chest 2/2018 stable; no follow up needed    Postmenopausal atrophic vaginitis 11/14/2011    Pruritic disorder 9/27/2013    Seborrheic keratosis 5/15/2013    Shingles 9/18/2014       Past Surgical History:   Procedure Laterality Date    TONSILLECTOMY          Family History   Problem Relation Age of Onset    Thyroid Cancer Daughter 52       Social History     Tobacco Use    Smoking status: Never Smoker    Smokeless tobacco: Never Used   Substance Use Topics    Alcohol use: No    Drug use: No            Review of Systems  Review of Systems    Objective:   Physical Exam  Vitals:    05/02/21 1919   Pulse: 82     Wt Readings from Last 3 Encounters:   03/17/21 133 lb (60.3 kg)   12/09/20 136 lb (61.7 kg)   08/05/20 133 lb 9.6 oz (60.6 kg)     Temp Readings from Last 3 Encounters:   03/17/21 97 °F (36.1 °C) (Temporal)   12/09/20 96.9 °F (36.1 °C) (Temporal)   08/05/20 97.6 °F (36.4 °C) (Temporal)     BP Readings from Last 3 Encounters:   03/17/21 134/74   12/09/20 117/62   08/05/20 122/64     Pulse Readings from Last 3 Encounters:   05/02/21 82   03/17/21 78   12/09/20 75      Physical Exam  NAD  Lying in bed sleeping. Easily aroused and knows me. Mood and affect are severely depressed. No agitation or psychomotor retardation. Not suicidal. Judgement and insight are intact  Skin is intact, no rashes, abrasions or bruises on face, arms, legs  Oral mucosa is moist.   Chest is clear, no wheezing or rales. Normal symmetric air entry throughout both lung fields. Heart regular with normal rate, no murmer or gallop   Gait is slow and unstable; has to hold on to furniture to walk. No ataxia. No pedal edema. Assessment:       Diagnosis Orders   1. Severe episode of recurrent major depressive disorder, without psychotic features (HCC)  FLUoxetine (PROZAC) 20 MG capsule   2. Anxiety state  LORazepam (ATIVAN) 0.5 MG tablet          Plan:      Stop Lexapr and start Prozac. Decrease Ativan to 0.25 mg BID and 0.5 mg at HS. I had a long discussion with her; she agreed to get out of bed and stay out except to sleep and 1 30 minute nap as needed. If she continued to stay in bed inpatient psychiatric tx will be necessary. Home health psych services are currently being looked in to by my office. I will follow up with her next week by phone an in office or VV in 1 -2 weeks or prn. On the basis of positive falls risk screening, assessment and plan is as follows: home safety tips provided, resume HEP.  consider home PT.

## 2021-05-02 NOTE — TELEPHONE ENCOUNTER
Phone call to pt. Is out of bed most of the weekend and eating more. She complains of GI upset on Prozac and Buspar so she stopped taking them. Advised to resume Lexapro and we will continue to work with getting home psych evaluation.

## 2021-05-05 DIAGNOSIS — E78.00 PURE HYPERCHOLESTEROLEMIA: ICD-10-CM

## 2021-05-06 RX ORDER — FENOFIBRATE 134 MG/1
CAPSULE ORAL
Qty: 90 CAPSULE | Refills: 0 | Status: SHIPPED | OUTPATIENT
Start: 2021-05-06 | End: 2021-07-22

## 2021-05-06 NOTE — TELEPHONE ENCOUNTER
Requested Prescriptions     Pending Prescriptions Disp Refills    fenofibrate micronized (LOFIBRA) 134 MG capsule [Pharmacy Med Name: FENOFIBRATE 134 MG CAPSULE] 90 capsule 0     Sig: TAKE ONE CAPSULE BY MOUTH DAILY IN THE MORNING BEFORE BREAKFAST       LOV 4/30/2021  NOV 6/16/21  Labs 2/21/21

## 2021-05-07 ENCOUNTER — TELEPHONE (OUTPATIENT)
Dept: FAMILY MEDICINE CLINIC | Age: 81
End: 2021-05-07

## 2021-05-07 NOTE — TELEPHONE ENCOUNTER
States psych has never contacted  re: Gabriel Mir. Wants to know what he should do.   Please advise  Thank you        [facility] Cell [address] # [phone]

## 2021-05-07 NOTE — TELEPHONE ENCOUNTER
Jackie's spouse, Jennifer Mcmillan, called requesting a call back from Dr. Lilliana Romeo. He states he has a \"situation\" regarding Stephanie Obrien that he would like to discuss with her. I offered to transfer him to the triage nurse and he declined. He states he wants to speak with Dr. Lilliana Romeo directly. Please advise. Thanks.           Jennifer Mcmillan [phone] (home)    cell # [phone]

## 2021-05-07 NOTE — TELEPHONE ENCOUNTER
Todd: Address: [address] [place] [address], [address] [place] [address]    Phone: [phone]    LM with  for a return call re: in home psych evaluation. ,    Keep note marked as new

## 2021-05-10 NOTE — TELEPHONE ENCOUNTER
Todd called back and they do not do home health evaluations for pych. [facility] is an assisted living mostly for dementia. [facility] ([phone]) will  do in home evaluations for their in patient program. I spoke with Kylie Valdez at [facility] and gave her Jackie's contact info and she will call  today to set up a home pych eval. I informed Coral Ivey also.

## 2021-05-11 ENCOUNTER — TELEPHONE (OUTPATIENT)
Dept: FAMILY MEDICINE CLINIC | Age: 81
End: 2021-05-11

## 2021-05-11 DIAGNOSIS — F41.1 ANXIETY STATE: ICD-10-CM

## 2021-05-11 DIAGNOSIS — F33.2 SEVERE EPISODE OF RECURRENT MAJOR DEPRESSIVE DISORDER, WITHOUT PSYCHOTIC FEATURES (HCC): Primary | ICD-10-CM

## 2021-05-11 RX ORDER — LORAZEPAM 0.5 MG/1
TABLET ORAL
Qty: 70 TABLET | Refills: 2 | Status: SHIPPED | OUTPATIENT
Start: 2021-05-11 | End: 2021-07-01 | Stop reason: SDUPTHER

## 2021-05-11 NOTE — TELEPHONE ENCOUNTER
Gave Mr. Kia Anderson the information about [facility] and that someone should be reaching out to him.

## 2021-05-11 NOTE — TELEPHONE ENCOUNTER
Pt  informed and will take Kristy Headings to get labs and to [facility] for psych eval.    Please call 's Cell [phone]

## 2021-05-11 NOTE — TELEPHONE ENCOUNTER
Spoke with Melany Gan from [facility], their doctor denied the eval based on criteria - states he is recommending outpatient treatment. [facility] offers outpatient therapy for seniors.

## 2021-05-11 NOTE — TELEPHONE ENCOUNTER
Spoke to Yosef at [facility] for possible admission to geriatric psych unit- He suggested having family take her to ED for psych eval and updated labs- Provider at [facility] prefers to have labs within the last 72 hours. Upon receiving referral from [address] [place] [address] could arrange transport from ED/home for admission to [facility] for treatment. Medicare covers this service.  Please advise

## 2021-05-11 NOTE — TELEPHONE ENCOUNTER
Patient's  is very concerned about his wife. He states she is sleeping 19+ hours a day. He is concerned that the patient is purposely overdosing on the lorazepam. He states he does not trust his wife to correctly take the medication - she has access to her meds at all times. Mr. Keren Mandel needs to speak with Dr. Lilliana Romeo and asked that she please contact him on his cell phone (so he can speak with her without his wife being around). # [phone] mobile (1st choice) or home # [phone] (2nd choice). · Please also see telephone encounter from 5/7/2021 re [facility] not being able to see the patient for an in-home psych evaluation.

## 2021-05-11 NOTE — TELEPHONE ENCOUNTER
Jai Pfeiffer, Jackie's daughter (on HIPAA), called to get clarification. Her father seems confused about what to do. I read her Dr. Arianna Medrano' most recent message but she was asking questions I'm not sure how to answer. Do they take her to [facility]? When will Dr. Arianna Medrano be calling Misael Kam - because Mike wants to be there? Does she have her labs done at the ED? Please advise and call Mike back. Thanks.           [place] [phone]

## 2021-05-11 NOTE — TELEPHONE ENCOUNTER
Patient is very concerned that she will run out of her lorazepam 0.5 mg tablet. She has 6 pills left. There is a Rx in Epic written on 5/2/2021 but I don't see that it was sent to the pharmacy. Please look in to this. [facility] [address] [facility] [address] [phone]     Last OV: 4/30/2021  Last labs: 2/21/2021    Per Dr. Viola Redding note on 4/30/2021:  decrease Ativan to 0.25 mg BID and 0.5 mg at HS.

## 2021-05-12 DIAGNOSIS — F33.2 SEVERE EPISODE OF RECURRENT MAJOR DEPRESSIVE DISORDER, WITHOUT PSYCHOTIC FEATURES (HCC): ICD-10-CM

## 2021-05-12 LAB
A/G RATIO: 2.3 (ref 1.1–2.2)
ALBUMIN SERPL-MCNC: 4.2 G/DL (ref 3.4–5)
ALP BLD-CCNC: 52 U/L (ref 40–129)
ALT SERPL-CCNC: 20 U/L (ref 10–40)
ANION GAP SERPL CALCULATED.3IONS-SCNC: 14 MMOL/L (ref 3–16)
AST SERPL-CCNC: 30 U/L (ref 15–37)
BASOPHILS ABSOLUTE: 0 K/UL (ref 0–0.2)
BASOPHILS RELATIVE PERCENT: 0.6 %
BILIRUB SERPL-MCNC: 0.4 MG/DL (ref 0–1)
BUN BLDV-MCNC: 23 MG/DL (ref 7–20)
CALCIUM SERPL-MCNC: 10.6 MG/DL (ref 8.3–10.6)
CHLORIDE BLD-SCNC: 103 MMOL/L (ref 99–110)
CO2: 27 MMOL/L (ref 21–32)
CREAT SERPL-MCNC: 1.5 MG/DL (ref 0.6–1.2)
EOSINOPHILS ABSOLUTE: 0.1 K/UL (ref 0–0.6)
EOSINOPHILS RELATIVE PERCENT: 2.2 %
GFR AFRICAN AMERICAN: 41
GFR NON-AFRICAN AMERICAN: 33
GLOBULIN: 1.8 G/DL
GLUCOSE BLD-MCNC: 96 MG/DL (ref 70–99)
HCT VFR BLD CALC: 41.3 % (ref 36–48)
HEMOGLOBIN: 13.6 G/DL (ref 12–16)
LYMPHOCYTES ABSOLUTE: 2.8 K/UL (ref 1–5.1)
LYMPHOCYTES RELATIVE PERCENT: 43.6 %
MCH RBC QN AUTO: 28.4 PG (ref 26–34)
MCHC RBC AUTO-ENTMCNC: 33 G/DL (ref 31–36)
MCV RBC AUTO: 86 FL (ref 80–100)
MONOCYTES ABSOLUTE: 0.7 K/UL (ref 0–1.3)
MONOCYTES RELATIVE PERCENT: 10.8 %
NEUTROPHILS ABSOLUTE: 2.7 K/UL (ref 1.7–7.7)
NEUTROPHILS RELATIVE PERCENT: 42.8 %
PDW BLD-RTO: 16.3 % (ref 12.4–15.4)
PLATELET # BLD: 258 K/UL (ref 135–450)
PMV BLD AUTO: 10.2 FL (ref 5–10.5)
POTASSIUM SERPL-SCNC: 4.1 MMOL/L (ref 3.5–5.1)
RBC # BLD: 4.8 M/UL (ref 4–5.2)
SODIUM BLD-SCNC: 144 MMOL/L (ref 136–145)
T4 FREE: 1.9 NG/DL (ref 0.9–1.8)
TOTAL PROTEIN: 6 G/DL (ref 6.4–8.2)
TSH REFLEX: 4.53 UIU/ML (ref 0.27–4.2)
VITAMIN B-12: 523 PG/ML (ref 211–911)
WBC # BLD: 6.4 K/UL (ref 4–11)

## 2021-05-13 DIAGNOSIS — E03.9 ACQUIRED HYPOTHYROIDISM: ICD-10-CM

## 2021-05-13 RX ORDER — LEVOTHYROXINE SODIUM 0.1 MG/1
100 TABLET ORAL DAILY
Qty: 90 TABLET | Refills: 0 | Status: SHIPPED | OUTPATIENT
Start: 2021-05-13 | End: 2021-08-09

## 2021-05-14 ENCOUNTER — TELEPHONE (OUTPATIENT)
Dept: FAMILY MEDICINE CLINIC | Age: 81
End: 2021-05-14

## 2021-05-14 NOTE — TELEPHONE ENCOUNTER
Patient's  called today stating he wanted to talk to someone about patient's lab work and what the next step/s were for his wife who has been having a major depressive episode. The message was given to the  stating the labs came back to where he could go ahead and take his wife to [facility].  He became somewhat overwhelmed and asked that I call and speak to their daughter Remi Emmanuel. I phoned Remi Emmanuel and gave her the information for [facility] and informed her the information for the labwork was being faxed. Remi Emmanuel is going to call over prior to taking her mother so there will be as little confusion as possible. Labs were faxed to [facility] intake at [phone].

## 2021-05-17 ENCOUNTER — TELEPHONE (OUTPATIENT)
Dept: FAMILY MEDICINE CLINIC | Age: 81
End: 2021-05-17

## 2021-05-18 ENCOUNTER — TELEPHONE (OUTPATIENT)
Dept: FAMILY MEDICINE CLINIC | Age: 81
End: 2021-05-18

## 2021-05-18 NOTE — TELEPHONE ENCOUNTER
I am fine with having her see a psychiatrist if they can fine one who accepts her insurance and can see her asap. They can try:   I tried to go on line to see who is on her plan but cannot do so since I am not the pt. [facility] [address] [facility]  Phone: [phone]    [facility] [address] Psychiatry.   [phone]

## 2021-05-18 NOTE — TELEPHONE ENCOUNTER
ALEXUS  Spoke with pt's  and daughter. They have not had an eval for pt at [facility] yet. States things just won't fall into place. They took her Sat but they do not do on weekends unless sent from the hospital.    They,  and daughter, spoke with someone at [facility] who tried to do what she could for them. Determined that pt is not suicidal and would have to do as outpatient. They were told that someone would call them on Monday but no one has called. I advised  to call them and follow up. He ask me to call his daughter. Spoke with daughter Alcon Wade and advised to call [facility] to follow up. Daughter states she is not impressed with this facility but will call them. I asked her to keep pcp SM informed to progress. Pt's  states pt is still sleeping all of the time even with med dosage reduced.

## 2021-07-01 DIAGNOSIS — F33.2 SEVERE EPISODE OF RECURRENT MAJOR DEPRESSIVE DISORDER, WITHOUT PSYCHOTIC FEATURES (HCC): Primary | ICD-10-CM

## 2021-07-01 DIAGNOSIS — F41.1 ANXIETY STATE: ICD-10-CM

## 2021-07-01 RX ORDER — LORAZEPAM 0.5 MG/1
TABLET ORAL
Qty: 10 TABLET | Refills: 0 | Status: SHIPPED | OUTPATIENT
Start: 2021-07-01 | End: 2021-07-06 | Stop reason: SDUPTHER

## 2021-07-01 NOTE — TELEPHONE ENCOUNTER
Family dynamics are very difficult. Earlene Lofton is refusing to leave the house and still spends a lot of time in bed. Goal is to wean off the Ativan and get her in to a psychiatrist.    I ordered a home health evaluation; please initiate referral.   Thanks.

## 2021-07-01 NOTE — TELEPHONE ENCOUNTER
Pt. Called and states she is completely out of xanax medication. I spoke to the pharm at [facility] and pt. got #70 xanax filled on 6-8-21. The directions of half BID and 1-2 at bedtime do make #70 a 23 day supply if she is taking 3 a day. Is it ok to refill? She is due if #70 is not supposed to be a 30 day supply. Refills are on file at pharm just need ok to fill as 23 day supply.

## 2021-07-01 NOTE — TELEPHONE ENCOUNTER
----- Message from Arpita Mahan sent at 7/1/2021  8:26 AM EDT -----  Subject: Refill Request    QUESTIONS  Name of Medication? LORazepam (ATIVAN) 0.5 MG tablet  Patient-reported dosage and instructions? 1 whole at bedtime and 2 halves   during the day. How many days do you have left? 0  Preferred Pharmacy? [address] phone number (if available)? [phone]  Additional Information for Provider? [facility] Francois Vega to ok this refill request or they won't refill it. Pt   states that this she has no more refills and no more pills at home and   that if she goes off of it that she could have a seizure. Please   call/advise.   ---------------------------------------------------------------------------  --------------  CALL BACK INFO  What is the best way for the office to contact you? OK to leave message on   voicemail  Preferred Call Back Phone Number?  [phone]

## 2021-07-03 ENCOUNTER — HOSPITAL ENCOUNTER (EMERGENCY)
Age: 81
Discharge: HOME | End: 2021-07-04
Attending: EMERGENCY MEDICINE
Payer: MEDICARE

## 2021-07-03 VITALS
SYSTOLIC BLOOD PRESSURE: 173 MMHG | DIASTOLIC BLOOD PRESSURE: 93 MMHG | BODY MASS INDEX: 20.89 KG/M2 | HEIGHT: 66 IN | RESPIRATION RATE: 18 BRPM | OXYGEN SATURATION: 100 % | WEIGHT: 130 LBS | TEMPERATURE: 98.7 F | HEART RATE: 89 BPM

## 2021-07-03 DIAGNOSIS — R68.89 SYMPTOM OF DRUG WITHDRAWAL: Primary | ICD-10-CM

## 2021-07-03 PROCEDURE — 99283 EMERGENCY DEPT VISIT LOW MDM: CPT

## 2021-07-03 RX ORDER — LORAZEPAM 1 MG/1
1 TABLET ORAL ONCE
Status: COMPLETED | OUTPATIENT
Start: 2021-07-04 | End: 2021-07-04

## 2021-07-04 LAB
A/G RATIO: 1.5 (ref 1.1–2.2)
ALBUMIN SERPL-MCNC: 3.9 G/DL (ref 3.4–5)
ALP BLD-CCNC: 62 U/L (ref 40–129)
ALT SERPL-CCNC: 16 U/L (ref 10–40)
ANION GAP SERPL CALCULATED.3IONS-SCNC: 9 MMOL/L (ref 3–16)
AST SERPL-CCNC: 24 U/L (ref 15–37)
BASOPHILS ABSOLUTE: 0 K/UL (ref 0–0.2)
BASOPHILS RELATIVE PERCENT: 0.7 %
BILIRUB SERPL-MCNC: 0.3 MG/DL (ref 0–1)
BUN BLDV-MCNC: 19 MG/DL (ref 7–20)
CALCIUM SERPL-MCNC: 9.8 MG/DL (ref 8.3–10.6)
CHLORIDE BLD-SCNC: 103 MMOL/L (ref 99–110)
CO2: 25 MMOL/L (ref 21–32)
CREAT SERPL-MCNC: 0.9 MG/DL (ref 0.6–1.2)
EKG ATRIAL RATE: 77 BPM
EKG DIAGNOSIS: NORMAL
EKG P AXIS: 42 DEGREES
EKG P-R INTERVAL: 156 MS
EKG Q-T INTERVAL: 380 MS
EKG QRS DURATION: 70 MS
EKG QTC CALCULATION (BAZETT): 430 MS
EKG R AXIS: 23 DEGREES
EKG T AXIS: 30 DEGREES
EKG VENTRICULAR RATE: 77 BPM
EOSINOPHILS ABSOLUTE: 0.1 K/UL (ref 0–0.6)
EOSINOPHILS RELATIVE PERCENT: 1 %
GFR AFRICAN AMERICAN: >60
GFR NON-AFRICAN AMERICAN: >60
GLOBULIN: 2.6 G/DL
GLUCOSE BLD-MCNC: 101 MG/DL (ref 70–99)
HCT VFR BLD CALC: 37.5 % (ref 36–48)
HEMOGLOBIN: 12.4 G/DL (ref 12–16)
LYMPHOCYTES ABSOLUTE: 1.1 K/UL (ref 1–5.1)
LYMPHOCYTES RELATIVE PERCENT: 17.5 %
MCH RBC QN AUTO: 29 PG (ref 26–34)
MCHC RBC AUTO-ENTMCNC: 33 G/DL (ref 31–36)
MCV RBC AUTO: 88 FL (ref 80–100)
MONOCYTES ABSOLUTE: 0.7 K/UL (ref 0–1.3)
MONOCYTES RELATIVE PERCENT: 11.2 %
NEUTROPHILS ABSOLUTE: 4.2 K/UL (ref 1.7–7.7)
NEUTROPHILS RELATIVE PERCENT: 69.6 %
PDW BLD-RTO: 17.5 % (ref 12.4–15.4)
PLATELET # BLD: 279 K/UL (ref 135–450)
PMV BLD AUTO: 9.7 FL (ref 5–10.5)
POTASSIUM REFLEX MAGNESIUM: 4.1 MMOL/L (ref 3.5–5.1)
PRO-BNP: 416 PG/ML (ref 0–449)
RBC # BLD: 4.26 M/UL (ref 4–5.2)
SODIUM BLD-SCNC: 137 MMOL/L (ref 136–145)
TOTAL PROTEIN: 6.5 G/DL (ref 6.4–8.2)
TROPONIN: <0.01 NG/ML
WBC # BLD: 6 K/UL (ref 4–11)

## 2021-07-04 PROCEDURE — 84484 ASSAY OF TROPONIN QUANT: CPT

## 2021-07-04 PROCEDURE — 6370000000 HC RX 637 (ALT 250 FOR IP): Performed by: EMERGENCY MEDICINE

## 2021-07-04 PROCEDURE — 85025 COMPLETE CBC W/AUTO DIFF WBC: CPT

## 2021-07-04 PROCEDURE — 80053 COMPREHEN METABOLIC PANEL: CPT

## 2021-07-04 PROCEDURE — 83880 ASSAY OF NATRIURETIC PEPTIDE: CPT

## 2021-07-04 PROCEDURE — 93005 ELECTROCARDIOGRAM TRACING: CPT | Performed by: EMERGENCY MEDICINE

## 2021-07-04 RX ADMIN — LORAZEPAM 1 MG: 1 TABLET ORAL at 00:05

## 2021-07-04 NOTE — ED PROVIDER NOTES
[address] [facility] [address] ENCOUNTER          ATTENDING PHYSICIAN NOTE       Date of evaluation: 7/3/2021    Chief Complaint     Shaking      History of Present Illness     Nicolas Gonsalez is a 80 y.o. female who presents with complaint of feeling shaky and feeling \"like I am going to die\". She is unable to really describe exactly what the feeling like she is going to die is, such as a pain, shortness of breath or anything like that, denies any symptoms otherwise. She says that she feels shaky and feels this way with the symptoms when she does not take Ativan. She is chronically prescribed Ativan reportedly has been for years, and is now on 1/2 mg 2 times during the day and 1 mg at night. She says she did not take any Ativan today. She says she does not really know why she does not take it today, and that she does have Ativan pills to take in fact her  has them with them now. Patient's  raises concern that the he believes the patient is addicted to Ativan, and has been taking it for years. He also reports that she tells her when okay but that she sleeps for between 15 to 19 hours a day or this is in bed for 15 to 19 hours a day. Patient says that she is not feeling bad, denies suicidal or homicidal ideation, denies any thoughts of self-harm. They have previously worked with her PCP to try to get the patient to see psychiatrist, but the patient has been reticent to do so and they have not actually gotten her to see anyone. Of note, there does not appear to be an acute change in the symptoms today. Review of Systems     Review of Systems  Pertinent positives and negatives are listed in the HPI; otherwise all systems are reviewed and were negative.      Past Medical, Surgical, Family, and Social History     She has a past medical history of Anal pruritus, Closed fracture of multiple ribs of right side with routine healing, Diastolic dysfunction, Falls, Gastrocnemius muscle rupture, Hemopneumothorax on right, Lung nodules, Postmenopausal atrophic vaginitis, Pruritic disorder, Seborrheic keratosis, and Shingles. She has a past surgical history that includes Tonsillectomy. Her family history includes Thyroid Cancer (age of onset: 52) in her daughter. She reports that she has never smoked. She has never used smokeless tobacco. She reports that she does not drink alcohol and does not use drugs. Medications     Previous Medications    ACETAMINOPHEN (TYLENOL) 325 MG TABLET    Take 975 mg by mouth every 8 hours as needed for Pain    CALCIUM & MAGNESIUM CARBONATES 311-232 MG CAPS    Take 1 tablet by mouth 2 times daily. CAPSICUM, CAYENNE, (CAYENNE PEPPER PO)    Take  by mouth. COENZYME Q10-FISH OIL-VIT E (CO-Q 10 OMEGA-3 FISH OIL) CAPS    Take 1 capsule by mouth daily. ESCITALOPRAM (LEXAPRO) 10 MG TABLET    TAKE 1 TABLET BY MOUTH EVERY DAY    FENOFIBRATE MICRONIZED (LOFIBRA) 134 MG CAPSULE    TAKE ONE CAPSULE BY MOUTH DAILY IN THE MORNING BEFORE BREAKFAST    FEXOFENADINE HCL (ALLEGRA PO)    Take by mouth    HYDROCORTISONE (HYTONE) 2.5 % LOTION    Apply topically 3 times daily as needed (itching) Apply topically 2 times daily. LEVOTHYROXINE (SYNTHROID) 100 MCG TABLET    Take 1 tablet by mouth Daily    LORAZEPAM (ATIVAN) 0.5 MG TABLET    1/2 tab po BID prn and 1 tabs at HS    MAGNESIUM 100 MG CAPS    Take 1 tablet by mouth daily. MOMETASONE (ELOCON) 0.1 % CREAM    Apply topically daily Apply topically daily. NUTRITIONAL SUPPLEMENTS (JUICE PLUS FIBRE PO)    Take 1 tablet by mouth daily.     OMEPRAZOLE (PRILOSEC) 20 MG DELAYED RELEASE CAPSULE    TAKE 1 CAPSULE BY MOUTH DAILY    ONDANSETRON (ZOFRAN-ODT) 4 MG DISINTEGRATING TABLET    TAKE 1 TABLET BY MOUTH EVERY 8 HOURS AS NEEDED FOR NAUSEA AND VOMITING    SPECIALTY VITAMINS PRODUCTS (ONE-A-DAY BONE STRENGTH PO)    Take by mouth    VITAMIN C (ASCORBIC ACID) 500 MG TABLET    Take 500 mg by mouth daily    VITAMIN D (CHOLECALCIFEROL) 1000 UNIT TABS TABLET    Take 2,000 Units by mouth daily     ZINC PO    Take 1 tablet by mouth daily       Allergies     She is allergic to biaxin [clarithromycin], erythromycin, lescol, lipitor, macrodantin [nitrofurantoin], paxil [paroxetine], zoloft [sertraline hcl], bactrim [sulfamethoxazole-trimethoprim], biaxin [clarithromycin], ciprofloxacin, and erythromycin. Physical Exam     INITIAL VITALS: BP: (!) 173/93, Temp: 98.7 °F (37.1 °C), Pulse: 89, Resp: 18, SpO2: 100 %   Physical Exam  Constitutional:       General: She is not in acute distress. Comments: Thin adult female   HENT:      Head: Normocephalic and atraumatic. Nose: Nose normal.   Cardiovascular:      Rate and Rhythm: Normal rate and regular rhythm. Pulmonary:      Effort: Pulmonary effort is normal. No respiratory distress. Chest:      Chest wall: No tenderness. Abdominal:      General: Abdomen is flat. Palpations: Abdomen is soft. Musculoskeletal:         General: No swelling or tenderness. Cervical back: Normal range of motion. Skin:     Capillary Refill: Capillary refill takes less than 2 seconds. Neurological:      General: No focal deficit present. Mental Status: She is alert and oriented to person, place, and time. Motor: No weakness. Psychiatric:         Behavior: Behavior normal.      Comments: Denies HI/ SI/ hallucination         Diagnostic Results     EKG   Sinus rhythm, rate 77 bpm, TX interval 156, QRS 70 ms, . No acute ischemic changes noted.     RADIOLOGY:  No orders to display       LABS:   Results for orders placed or performed during the hospital encounter of 07/03/21   CBC Auto Differential   Result Value Ref Range    WBC 6.0 4.0 - 11.0 K/uL    RBC 4.26 4.00 - 5.20 M/uL    Hemoglobin 12.4 12.0 - 16.0 g/dL    Hematocrit 37.5 36.0 - 48.0 %    MCV 88.0 80.0 - 100.0 fL    MCH 29.0 26.0 - 34.0 pg    MCHC 33.0 31.0 - 36.0 g/dL    RDW 17.5 (H) 12.4 - 15.4 %    Platelets 279 135 - 450 K/uL    MPV 9.7 5.0 - 10.5 fL    Neutrophils % 69.6 %    Lymphocytes % 17.5 %    Monocytes % 11.2 %    Eosinophils % 1.0 %    Basophils % 0.7 %    Neutrophils Absolute 4.2 1.7 - 7.7 K/uL    Lymphocytes Absolute 1.1 1.0 - 5.1 K/uL    Monocytes Absolute 0.7 0.0 - 1.3 K/uL    Eosinophils Absolute 0.1 0.0 - 0.6 K/uL    Basophils Absolute 0.0 0.0 - 0.2 K/uL   Comprehensive Metabolic Panel w/ Reflex to MG   Result Value Ref Range    Sodium 137 136 - 145 mmol/L    Potassium reflex Magnesium 4.1 3.5 - 5.1 mmol/L    Chloride 103 99 - 110 mmol/L    CO2 25 21 - 32 mmol/L    Anion Gap 9 3 - 16    Glucose 101 (H) 70 - 99 mg/dL    BUN 19 7 - 20 mg/dL    CREATININE 0.9 0.6 - 1.2 mg/dL    GFR Non-African American >60 >60    GFR African American >60 >60    Calcium 9.8 8.3 - 10.6 mg/dL    Total Protein 6.5 6.4 - 8.2 g/dL    Albumin 3.9 3.4 - 5.0 g/dL    Albumin/Globulin Ratio 1.5 1.1 - 2.2    Total Bilirubin 0.3 0.0 - 1.0 mg/dL    Alkaline Phosphatase 62 40 - 129 U/L    ALT 16 10 - 40 U/L    AST 24 15 - 37 U/L    Globulin 2.6 g/dL   Troponin   Result Value Ref Range    Troponin <0.01 <0.01 ng/mL   Brain Natriuretic Peptide   Result Value Ref Range    Pro- 0 - 449 pg/mL       ED BEDSIDE ULTRASOUND:      RECENT VITALS:  BP: (!) 173/93,Temp: 98.7 °F (37.1 °C), Pulse: 89, Resp: 18, SpO2: 100 %     Procedures         ED Course     Nursing Notes, Past Medical Hx, Past Surgical Hx, Social Hx,Allergies, and Family Hx were reviewed. patient was given the following medications:  Orders Placed This Encounter   Medications    LORazepam (ATIVAN) tablet 1 mg       CONSULTS:  [address] DECISIONMAKING / ASSESSMENT / Erich Jennifer is a 80 y.o. female who presents with shakiness and feeling of being generally unwell, not able to really clarify what that means otherwise. Her symptoms resolved when she was given 1 mg of Ativan p.o. Her labs are unremarkable. EKG is unremarkable. She has no gross neurological deficit. She has no thoughts of self-harm, appears to be taking care of herself, does not appear to be danger to herself or others. I did discuss with her potential effects of anxiety and depression can lead to physical symptoms as well, and that she should continue to follow-up with her primary care doctor to both wean her self down from Ativan safely as well as see psychiatry, but no indication for emergency admission, and really does not want a voluntary psychiatric admission. Clinical Impression     1.  Symptom of drug withdrawal        Disposition     PATIENT REFERRED TO:  Bill Jeffers MD  [place] [address]  [address] [address]  [phone]    In 3 days        DISCHARGE MEDICATIONS:  New Prescriptions    No medications on file       DISPOSITION Decision To Discharge 07/04/2021 01:29:40 AM         Charo Lin MD  07/04/21 6416

## 2021-07-04 NOTE — ED NOTES
JOHNNY paperwork and results explained to patient. Questions addressed and explained to patient's satisfaction.  Patient denies further needs and verbalized understanding of need for FU       Angel Willis RN  07/04/21 0248

## 2021-07-04 NOTE — ED NOTES
pulls the nurse aside. Rigoberto Simpson is addicted to these ativan pills. The doctor cut her back so she wouldn't sleep so much but she takes too many. It is like she wants to sleep all the time. She sleeps like 19 hours every day and is still super tired and super anxious. We have been to all the local hospitals but they can't find anything wrong with her. It is just in her head. Her PCP dr Hakeem Borrego has been pushing the patient to get checked out in the hospital for psychiatric care, but she won't do it. Tonight she said she felt like she was gonna die, so I got her to come in. Cn we get psych involved please. \"      Anaid Shaikh RN  07/03/21 [id number]

## 2021-07-04 NOTE — ED TRIAGE NOTES
Per the pt, she has been having shakes for a long time but it got worse today. PCP has been wanting to have her come in.

## 2021-07-05 NOTE — PROGRESS NOTES
in the ER. I talked the her on Sunday and she proclaimed she felt great after that and would like her Ativan dose increased back to 1 mg. She had not taking any Ativan at home when I spoke to her Sunday afternoon. I told her that it was not safe for her to be on the higher dose and recommended she resume the 0.25mg BID and 0.5 mg HS dose until this appointment. Her  is frustrated, appropriately. Today she states she has not taken any Ativan yet today. She has family stress - 's health problems, GD was scratched by a dog. She is much calmer today than when I spoke to her on Sunday. She is willing to slowly wean off the Ativan. She seems to be spending less time in bed, but still \"too much\" per her . She agrees to be out of bed durin waking hours. Risk of falls, weakness and dependence addressed. She states she is sleeping well but last night she did fall asleep until 3 AM.  She had a lot of PND last that kept her up. Flonase and Allegra are effective for this. her appetite is fine, she says she is eating well. She does not remember how much time she spent out     Outpatient Medications Marked as Taking for the 7/6/21 encounter (Virtual Visit) with Shana Nava MD   Medication Sig Dispense Refill    LORazepam (ATIVAN) 0.5 MG tablet Take 0.5 tablets by mouth 3 times daily for 29 days.  11 tablet 3    ondansetron (ZOFRAN-ODT) 4 MG disintegrating tablet TAKE 1 TABLET BY MOUTH EVERY 8 HOURS AS NEEDED FOR NAUSEA AND VOMITING 30 tablet 5    levothyroxine (SYNTHROID) 100 MCG tablet Take 1 tablet by mouth Daily 90 tablet 0    fenofibrate micronized (LOFIBRA) 134 MG capsule TAKE ONE CAPSULE BY MOUTH DAILY IN THE MORNING BEFORE BREAKFAST 90 capsule 0    escitalopram (LEXAPRO) 10 MG tablet TAKE 1 TABLET BY MOUTH EVERY DAY 90 tablet 1    Fexofenadine HCl (ALLEGRA PO) Take by mouth      omeprazole (PRILOSEC) 20 MG delayed release capsule TAKE 1 CAPSULE BY MOUTH DAILY 90 capsule 3        Lab Results   Component Value Date     07/04/2021    K 4.1 07/04/2021     07/04/2021    CO2 25 07/04/2021    BUN 19 07/04/2021    CREATININE 0.9 07/04/2021    GLUCOSE 101 (H) 07/04/2021    CALCIUM 9.8 07/04/2021    PROT 6.5 07/04/2021    LABALBU 3.9 07/04/2021    BILITOT 0.3 07/04/2021    ALKPHOS 62 07/04/2021    AST 24 07/04/2021    ALT 16 07/04/2021    LABGLOM >60 07/04/2021    GFRAA >60 07/04/2021    AGRATIO 1.5 07/04/2021    GLOB 2.6 07/04/2021        Lab Results   Component Value Date    WBC 6.0 07/04/2021    HGB 12.4 07/04/2021    HCT 37.5 07/04/2021    MCV 88.0 07/04/2021     07/04/2021     TSH   Date Value Ref Range Status   05/12/2021 4.53 (H) 0.27 - 4.20 uIU/mL Final   09/10/2020 0.54 0.27 - 4.20 uIU/mL Final   07/03/2019 1.75 0.27 - 4.20 uIU/mL Final   04/21/2015 1.34 0.27 - 4.20 uIU/mL Final   11/19/2012 2.19 0.35 - 5.5 uIU/ml Final   10/01/2012 0.41 0.35 - 5.5 uIU/ml Final   02/13/2012 0.46 0.35 - 5.5 uIU/ml Final     TSH, 3RD GENERATION   Date Value Ref Range Status   02/21/2021 3.82 0.45 - 4.12 uIU/mL Final      Details of this discussion including any medical advice provided:    Diagnosis Orders   1. Severe episode of recurrent major depressive disorder, without psychotic features ([address]  Some improvement. Continue lexapro   2. Anxiety state  LORazepam (ATIVAN) 0.5 MG tablet  Wean to 0.25 mg TID  Follow up one week or prn   3. Sedative, hypnotic or anxiolytic dependence, uncomplicated  Wean slowly over 6 to 12 weeks   S/s withdrawal addressed            I affirm this is a Patient Initiated Episode with a Patient who has not had a related appointment within my department in the past 7 days or scheduled within the next 24 hours.     Patient identification was verified at the start of the visit: Yes    Total Time: minutes: 11-20 minutes    The visit was conducted pursuant to the emergency declaration under the [facility]5 waiver authority and the [employer] Act. Patient identification was verified, and a caregiver was present when appropriate. The patient was located in a state where the provider was credentialed to provide care.     Note: not billable if this call serves to triage the patient into an appointment for the relevant concern      Marce Pichardo MD

## 2021-07-06 ENCOUNTER — TELEPHONE (OUTPATIENT)
Dept: FAMILY MEDICINE CLINIC | Age: 81
End: 2021-07-06

## 2021-07-06 ENCOUNTER — VIRTUAL VISIT (OUTPATIENT)
Dept: FAMILY MEDICINE CLINIC | Age: 81
End: 2021-07-06
Payer: MEDICARE

## 2021-07-06 DIAGNOSIS — F33.2 SEVERE EPISODE OF RECURRENT MAJOR DEPRESSIVE DISORDER, WITHOUT PSYCHOTIC FEATURES (HCC): Primary | ICD-10-CM

## 2021-07-06 DIAGNOSIS — F13.20 SEDATIVE, HYPNOTIC OR ANXIOLYTIC DEPENDENCE, UNCOMPLICATED (HCC): ICD-10-CM

## 2021-07-06 DIAGNOSIS — F41.1 ANXIETY STATE: ICD-10-CM

## 2021-07-06 PROCEDURE — 98967 PH1 ASSMT&MGMT NQHP 11-20: CPT | Performed by: FAMILY MEDICINE

## 2021-07-06 RX ORDER — TRIAMCINOLONE ACETONIDE 0.1 %
PASTE (GRAM) DENTAL
Qty: 5 G | Refills: 1 | Status: SHIPPED | OUTPATIENT
Start: 2021-07-06 | End: 2022-04-04

## 2021-07-06 RX ORDER — LORAZEPAM 0.5 MG/1
0.25 TABLET ORAL 3 TIMES DAILY
Qty: 11 TABLET | Refills: 3 | Status: SHIPPED | OUTPATIENT
Start: 2021-07-06 | End: 2021-08-02

## 2021-07-06 NOTE — TELEPHONE ENCOUNTER
Her mom agreed to wean the Ativan. Has to be done slowly. I decreased it to 1/2 tab TID and will call her next week.

## 2021-07-06 NOTE — TELEPHONE ENCOUNTER
Requested Prescriptions     Pending Prescriptions Disp Refills    triamcinolone acetonide (KENALOG) 0.1 % paste [Pharmacy Med Name: TRIAMCINOLONE 0.1% PASTE] 5 g      Sig: APPLY TO ORAL LESION 3 TIMES A DAY AS NEEDED       LOV 4/30/2021  No f/u  Labs 7/4/21

## 2021-07-06 NOTE — TELEPHONE ENCOUNTER
Daughter called in stating mom is addicted to the medication and wants to decrease the medication. States she is going to try the decrease - if it does not work on her own - wants to know if everyone is on the same page. Wants to talk with the drRadha About what she needs to do in order to hospitalize her if the decreasing does not work. Has talked with her before. Please call Aron Martinez. Please advise, thank you!

## 2021-07-15 ENCOUNTER — TELEPHONE (OUTPATIENT)
Dept: FAMILY MEDICINE CLINIC | Age: 81
End: 2021-07-15

## 2021-07-22 DIAGNOSIS — E78.00 PURE HYPERCHOLESTEROLEMIA: ICD-10-CM

## 2021-07-22 RX ORDER — FENOFIBRATE 134 MG/1
CAPSULE ORAL
Qty: 90 CAPSULE | Refills: 0 | Status: SHIPPED | OUTPATIENT
Start: 2021-07-22 | End: 2021-09-07

## 2021-07-22 NOTE — TELEPHONE ENCOUNTER
Requested Prescriptions     Pending Prescriptions Disp Refills    fenofibrate micronized (LOFIBRA) 134 MG capsule [Pharmacy Med Name: FENOFIBRATE 134 MG CAPSULE] 90 capsule 0     Sig: TAKE ONE CAPSULE BY MOUTH EVERY MORNING BEFORE BREAKFAST       [facility] 7/6/2021  No f/u  Labs 7/4/21

## 2021-07-30 ENCOUNTER — TELEPHONE (OUTPATIENT)
Dept: FAMILY MEDICINE CLINIC | Age: 81
End: 2021-07-30

## 2021-07-30 NOTE — TELEPHONE ENCOUNTER
Pt was called to schedule her AWV. Pt would like to schedule but only to do a strictly telephone visit. She would like to just talk on the phone as she does not have a smartphone or internet. Please advise if I can go ahead and schedule, thank you!

## 2021-07-31 ENCOUNTER — TELEPHONE (OUTPATIENT)
Dept: FAMILY MEDICINE CLINIC | Age: 81
End: 2021-07-31

## 2021-07-31 RX ORDER — AMOXICILLIN AND CLAVULANATE POTASSIUM 875; 125 MG/1; MG/1
1 TABLET, FILM COATED ORAL 2 TIMES DAILY
Qty: 20 TABLET | Refills: 0 | Status: SHIPPED | OUTPATIENT
Start: 2021-07-31 | End: 2021-08-10

## 2021-07-31 NOTE — TELEPHONE ENCOUNTER
> one week facial pain, nasal congestion, fever blisters on her lips. Getting worse x 2 days. No fever, cough, ST, GI sxs   Rx. None    Allergies   Allergen Reactions    Biaxin [Clarithromycin]     Erythromycin     Lescol     Lipitor     Macrodantin [Nitrofurantoin]     Paxil [Paroxetine]     Zoloft [Sertraline Hcl] Nausea Only and Other (See Comments)     C/o nausea and headaches     Bactrim [Sulfamethoxazole-Trimethoprim] Rash    Biaxin [Clarithromycin] Nausea Only    Ciprofloxacin Rash     Pain under arms.  Erythromycin Nausea Only      Current Outpatient Medications on File Prior to Visit   Medication Sig Dispense Refill    fenofibrate micronized (LOFIBRA) 134 MG capsule TAKE ONE CAPSULE BY MOUTH EVERY MORNING BEFORE BREAKFAST 90 capsule 0    triamcinolone acetonide (KENALOG) 0.1 % paste APPLY TO ORAL LESION 3 TIMES A DAY AS NEEDED 5 g 1    LORazepam (ATIVAN) 0.5 MG tablet Take 0.5 tablets by mouth 3 times daily for 29 days. 11 tablet 3    ondansetron (ZOFRAN-ODT) 4 MG disintegrating tablet TAKE 1 TABLET BY MOUTH EVERY 8 HOURS AS NEEDED FOR NAUSEA AND VOMITING 30 tablet 5    levothyroxine (SYNTHROID) 100 MCG tablet Take 1 tablet by mouth Daily 90 tablet 0    escitalopram (LEXAPRO) 10 MG tablet TAKE 1 TABLET BY MOUTH EVERY DAY 90 tablet 1    ZINC PO Take 1 tablet by mouth daily      Fexofenadine HCl (ALLEGRA PO) Take by mouth      Specialty Vitamins Products (ONE-A-DAY BONE STRENGTH PO) Take by mouth      omeprazole (PRILOSEC) 20 MG delayed release capsule TAKE 1 CAPSULE BY MOUTH DAILY 90 capsule 3    acetaminophen (TYLENOL) 325 MG tablet Take 975 mg by mouth every 8 hours as needed for Pain      vitamin C (ASCORBIC ACID) 500 MG tablet Take 500 mg by mouth daily      vitamin D (CHOLECALCIFEROL) 1000 UNIT TABS tablet Take 2,000 Units by mouth daily       Coenzyme Q10-Fish Oil-Vit E (CO-Q 10 OMEGA-3 FISH OIL) CAPS Take 1 capsule by mouth daily.         Magnesium 100 MG CAPS Take 1 tablet by mouth daily.  Capsicum, Cayenne, (CAYENNE PEPPER PO) Take  by mouth.  Nutritional Supplements (JUICE PLUS FIBRE PO) Take 1 tablet by mouth daily.  Calcium & Magnesium Carbonates 311-232 MG CAPS Take 1 tablet by mouth 2 times daily. No current facility-administered medications on file prior to visit. Rx; augmentin   Call or return to clinic prn if these symptoms worsen or fail to improve as anticipated.

## 2021-08-02 DIAGNOSIS — F41.1 ANXIETY STATE: ICD-10-CM

## 2021-08-02 RX ORDER — LORAZEPAM 0.5 MG/1
0.25 TABLET ORAL 2 TIMES DAILY
Qty: 7 TABLET | Refills: 3 | Status: SHIPPED | OUTPATIENT
Start: 2021-08-02 | End: 2021-08-26 | Stop reason: SDUPTHER

## 2021-08-02 NOTE — TELEPHONE ENCOUNTER
Collette Ginger had her pharmacy send over a refill request for lorazepam.  She is completely out and does not want to miss a dose. Last VV 7/6/2021. Please advise. Thanks.

## 2021-08-02 NOTE — TELEPHONE ENCOUNTER
Arnold Moise called again about her refill. She would like a call back from the office when the script has been sent. She wants to pick it up today, if possible. I let her know the message has already been routed to Dr. Lakisha Lo high priority.       Arnold Moise [phone] (home)

## 2021-08-26 ENCOUNTER — VIRTUAL VISIT (OUTPATIENT)
Dept: FAMILY MEDICINE CLINIC | Age: 81
End: 2021-08-26
Payer: MEDICARE

## 2021-08-26 DIAGNOSIS — Z00.00 ROUTINE GENERAL MEDICAL EXAMINATION AT A HEALTH CARE FACILITY: Primary | ICD-10-CM

## 2021-08-26 DIAGNOSIS — Z11.59 NEED FOR HEPATITIS C SCREENING TEST: ICD-10-CM

## 2021-08-26 DIAGNOSIS — F13.20 SEDATIVE, HYPNOTIC OR ANXIOLYTIC DEPENDENCE, UNCOMPLICATED (HCC): ICD-10-CM

## 2021-08-26 DIAGNOSIS — Z23 NEED FOR PROPHYLACTIC VACCINATION AND INOCULATION AGAINST VARICELLA: ICD-10-CM

## 2021-08-26 DIAGNOSIS — Z72.89 OTHER PROBLEMS RELATED TO LIFESTYLE: ICD-10-CM

## 2021-08-26 DIAGNOSIS — F41.1 ANXIETY STATE: Chronic | ICD-10-CM

## 2021-08-26 PROCEDURE — G0439 PPPS, SUBSEQ VISIT: HCPCS | Performed by: FAMILY MEDICINE

## 2021-08-26 RX ORDER — LORAZEPAM 0.5 MG/1
0.25 TABLET ORAL 2 TIMES DAILY
Qty: 7 TABLET | Refills: 5 | Status: SHIPPED | OUTPATIENT
Start: 2021-08-26 | End: 2021-08-30 | Stop reason: SDUPTHER

## 2021-08-26 ASSESSMENT — LIFESTYLE VARIABLES
HOW OFTEN DO YOU HAVE A DRINK CONTAINING ALCOHOL: 0
HOW OFTEN DO YOU HAVE A DRINK CONTAINING ALCOHOL: NEVER
AUDIT TOTAL SCORE: INCOMPLETE
AUDIT-C TOTAL SCORE: INCOMPLETE

## 2021-08-26 ASSESSMENT — PATIENT HEALTH QUESTIONNAIRE - PHQ9
2. FEELING DOWN, DEPRESSED OR HOPELESS: 1
SUM OF ALL RESPONSES TO PHQ QUESTIONS 1-9: 2
SUM OF ALL RESPONSES TO PHQ9 QUESTIONS 1 & 2: 2
SUM OF ALL RESPONSES TO PHQ QUESTIONS 1-9: 2
2. FEELING DOWN, DEPRESSED OR HOPELESS: 1
SUM OF ALL RESPONSES TO PHQ9 QUESTIONS 1 & 2: 2
1. LITTLE INTEREST OR PLEASURE IN DOING THINGS: 1
1. LITTLE INTEREST OR PLEASURE IN DOING THINGS: 1

## 2021-08-26 NOTE — PROGRESS NOTES
Medicare Annual Wellness Visit  Are Name: George Orozco Date: 2021   MRN: <[id number]> Sex: Female   Age: 80 y.o. Ethnicity: Non- / Non    : 1940 Race: White (non-)      Rosalba Alvarado is here for Medicare AWV    Screenings for behavioral, psychosocial and functional/safety risks, and cognitive dysfunction are all negative except as indicated below. These results, as well as other patient data from the [address] form, are documented in Flowsheets linked to this Encounter. Allergies   Allergen Reactions    Biaxin [Clarithromycin]     Erythromycin     Lescol     Lipitor     Macrodantin [Nitrofurantoin]     Paxil [Paroxetine]     Zoloft [Sertraline Hcl] Nausea Only and Other (See Comments)     C/o nausea and headaches     Bactrim [Sulfamethoxazole-Trimethoprim] Rash    Biaxin [Clarithromycin] Nausea Only    Ciprofloxacin Rash     Pain under arms.  Erythromycin Nausea Only     Outpatient Medications Marked as Taking for the 21 encounter (Virtual Visit) with [place] Pr[place] MD   Medication Sig Dispense Refill    levothyroxine (SYNTHROID) 100 MCG tablet TAKE 1 TABLET BY MOUTH EVERY DAY 90 tablet 2    LORazepam (ATIVAN) 0.5 MG tablet Take 0.5 tablets by mouth 2 times daily for 30 days. Please fill weekly to prevent over use.  thanks 7 tablet 3    fenofibrate micronized (LOFIBRA) 134 MG capsule TAKE ONE CAPSULE BY MOUTH EVERY MORNING BEFORE BREAKFAST 90 capsule 0    triamcinolone acetonide (KENALOG) 0.1 % paste APPLY TO ORAL LESION 3 TIMES A DAY AS NEEDED 5 g 1    ondansetron (ZOFRAN-ODT) 4 MG disintegrating tablet TAKE 1 TABLET BY MOUTH EVERY 8 HOURS AS NEEDED FOR NAUSEA AND VOMITING 30 tablet 5    escitalopram (LEXAPRO) 10 MG tablet TAKE 1 TABLET BY MOUTH EVERY DAY 90 tablet 1    ZINC PO Take 1 tablet by mouth daily      Fexofenadine HCl (ALLEGRA PO) Take by mouth      Specialty Vitamins Products (ONE-A-DAY BONE STRENGTH PO) Take by observation of the patient, evaluation of cognition reveals recent and remote memory intact. Patient's complete Health Risk Assessment and screening values have been reviewed and are found in Flowsheets. The following problems were reviewed today and where indicated follow up appointments were made and/or referrals ordered. [facility]; She is managing pretty well on the lower dose of Lorazepam.  She is taking 1/2 tab twice a day. She is very worried about the COVID delta variant and the state of world. She is getting out bed more. Her appetite is very good. She admits she feels better on the lower dose of Lorazepam.    Positive Risk Factor Screenings with Interventions:           Health Habits/Nutrition:  Health Habits/Nutrition  Do you exercise for at least 20 minutes 2-3 times per week?: (!) No  Have you lost any weight without trying in the past 3 months?: No  Do you eat only one meal per day?: No  Have you seen the dentist within the past year?: (!) No     Health Habits/Nutrition Interventions:  · Inadequate physical activity:  she is afraid to go to [employer] [employer] due to the delta strain. is too hot to walk outside. she does try to walk in the house.   she is doing HEP from PT    Hearing/Vision:  No exam data present  Hearing/Vision  Do you or your family notice any trouble with your hearing that hasn't been managed with hearing aids?: No  Do you have difficulty driving, watching TV, or doing any of your daily activities because of your eyesight?: No  Have you had an eye exam within the past year?: (!) No  Hearing/Vision Interventions:  · Vision concerns:  patient encouraged to make appointment with his/her eye specialist      Personalized Preventive Plan   Current Health Maintenance Status  Immunization History   Administered Date(s) Administered    COVID-19, Louis Peter, PF, 30mcg/0.3mL 03/07/2021, 03/28/2021    Influenza Nasal 09/14/2009, 11/04/2010    Influenza Virus Vaccine 09/14/2009, 11/04/2010    Influenza, High Dose (Fluzone 65 yrs and older) 09/17/2013, 10/01/2014, 11/10/2015, 09/14/2018, 10/07/2019    Influenza, High-dose, Quadv, 65 yrs +, IM (Fluzone) 12/09/2020    Pneumococcal Conjugate 13-valent ([id number]) 05/04/2015    Pneumococcal Polysaccharide (Vwedeahqp88) 10/15/2008    Td vaccine (adult) 09/26/2020    Tdap (Boostrix, Adacel) 02/13/2012, 07/13/2018    Zoster Live (Zostavax) 08/22/2011        Health Maintenance   Topic Date Due    Hepatitis C screen  Never done    Shingles Vaccine (2 of 3) 10/17/2011    Annual Wellness Visit (AWV)  Never done    Flu vaccine (1) 09/01/2021    TSH testing  05/12/2022    DTaP/Tdap/Td vaccine (4 - Td or Tdap) 09/26/2030    DEXA (modify frequency per FRAX score)  Completed    Pneumococcal 65+ years Vaccine  Completed    COVID-19 Vaccine  Completed    Hepatitis A vaccine  Aged Out    Hepatitis B vaccine  Aged Out    Hib vaccine  Aged Out    Meningococcal (ACWY) vaccine  Aged Out     Recommendations for [email or web address] Due: see orders and patient instructions/AVS.  . Recommended screening schedule for the next 5-10 years is provided to the patient in written form: see Patient Instructions/AVS.    Chance Pate was seen today for medicare awv. Diagnoses and all orders for this visit:    Routine general medical examination at a health care facility    Need for hepatitis C screening test  -     Hepatitis C Antibody; Future    Need for prophylactic vaccination and inoculation against varicella  -     zoster recombinant adjuvanted vaccine [facility]) 50 MCG/0.5ML SUSR injection; Inject 0.5 mLs into the muscle once for 1 dose    Other problems related to lifestyle  -     Hepatitis C Antibody; Future    Anxiety state  -     LORazepam (ATIVAN) 0.5 MG tablet; Take 0.5 tablets by mouth 2 times daily for 30 days. Please fill weekly to prevent over use. Thanks  PDMP reviewed and no concerns noted.    She seems much better, more engaged since on lower dose.  Discussed eventually weaning further but with current stressors will maintain current dose. Side effects of current medications reviewed and questions answered. Sedative, hypnotic or anxiolytic dependence, uncomplicated          Follow up in 2 months or prn. Nini Sesay is a 80 y.o. female being evaluated by a Virtual Visit (phone) encounter to address concerns as mentioned above. A caregiver was present when appropriate. Due to this being a TeleHealth encounter ([facility]), evaluation of the following organ systems was limited: Vitals/Constitutional/EENT/Resp/CV/GI//MS/Neuro/Skin/Heme-Lymph-Imm. Pursuant to the emergency declaration under the [address] [place] [address] authority and the [employer] Act, this Virtual Visit was conducted with patient's (and/or legal guardian's) consent, to reduce the patient's risk of exposure to COVID-19 and provide necessary medical care. The patient (and/or legal guardian) has also been advised to contact this office for worsening conditions or problems, and seek emergency medical treatment and/or call 911 if deemed necessary. Patient identification was verified at the start of the visit: Yes    Services were provided through phone to substitute for in-person clinic visit. Patient and provider were located at their individual homes. --Dave Aguilar MD on 8/26/2021 at 11:09 AM    An electronic signature was used to authenticate this note. Advance Care Planning   Advanced Care Planning: Discussed the patients choices for care and treatment in case of a health event that adversely affects decision-making abilities. Also discussed the patients long-term treatment options.  Reviewed with the patient the [facility] of [address] [place] [address] Will Declaration forms  Reviewed the process of designating a competent adult as an Agent (or -in-fact) that could take make health care decisions for the patient if incompetent. Patient was asked to complete the declaration forms, either acknowledge the forms by a public notary or an eligible witness and provide a signed copy to the practice office. Time spent (minutes): she has a LW and DPOA and will provide a copy. She states she would not to be a vegetable. Cardiovascular Disease Risk Counseling: Assessed the patient's risk to develop cardiovascular disease and reviewed main risk factors. Reviewed steps to reduce disease risk including:   · Quitting tobacco use, reducing amount smoked, or not starting the habit  · Making healthy food choices  · Being physically active and gradualy increasing activity levels   · Reduce weight and determine a healthy BMI goal  · Monitor blood pressure and treat if higher than 140/90 mmHg  · Maintain blood total cholesterol levels under 5 mmol/l or 190 mg/dl  · Maintain LDL cholesterol levels under 3.0 mmol/l or 115 mg/dl   · Control blood glucose levels  ·   Provided a follow up plan. Time spent (minutes): 5 minutes. Continue fenofibrate, diet, exercise.

## 2021-08-26 NOTE — PATIENT INSTRUCTIONS
Personalized Preventive Plan for Nonda Bound - 8/26/2021  Medicare offers a range of preventive health benefits. Some of the tests and screenings are paid in full while other may be subject to a deductible, co-insurance, and/or copay. Some of these benefits include a comprehensive review of your medical history including lifestyle, illnesses that may run in your family, and various assessments and screenings as appropriate. After reviewing your medical record and screening and assessments performed today your provider may have ordered immunizations, labs, imaging, and/or referrals for you. A list of these orders (if applicable) as well as your Preventive Care list are included within your After Visit Summary for your review. Other Preventive Recommendations:    · A preventive eye exam performed by an eye specialist is recommended every 1-2 years to screen for glaucoma; cataracts, macular degeneration, and other eye disorders. · A preventive dental visit is recommended every 6 months. · Try to get at least 150 minutes of exercise per week or 10,000 steps per day on a pedometer . · Order or download the FREE \"Exercise & Physical Activity: Your Everyday Guide\" from [employer]. Call 2-916.855.9779 or search [employer] online. · You need 8696-3980 mg of calcium and 8935-0116 IU of vitamin D per day. It is possible to meet your calcium requirement with diet alone, but a vitamin D supplement is usually necessary to meet this goal.  · When exposed to the sun, use a sunscreen that protects against both UVA and UVB radiation with an SPF of 30 or greater. Reapply every 2 to 3 hours or after sweating, drying off with a towel, or swimming. · Always wear a seat belt when traveling in a car. Always wear a helmet when riding a bicycle or motorcycle.

## 2021-08-30 ENCOUNTER — TELEPHONE (OUTPATIENT)
Dept: FAMILY MEDICINE CLINIC | Age: 81
End: 2021-08-30

## 2021-08-30 DIAGNOSIS — F41.1 ANXIETY STATE: Chronic | ICD-10-CM

## 2021-08-30 RX ORDER — LORAZEPAM 0.5 MG/1
TABLET ORAL
Qty: 5 TABLET | Refills: 0 | Status: SHIPPED | OUTPATIENT
Start: 2021-08-30 | End: 2021-09-07

## 2021-08-30 NOTE — TELEPHONE ENCOUNTER
I am only giving a week at a time to prevent over use. Because she cannot take as directed I am going to wean her completely off. Please call the pharmacy to fill now becsause withdrawal can be dangerous but I am going to decrease to 1/2 tab once a day for one week, 1/2 every other day for one week then discontinue. Order sent to the pharmacy.   Thanks

## 2021-08-30 NOTE — TELEPHONE ENCOUNTER
Patient also called about withdrawal symtoms from her LORazepam (ATIVAN) 0.5 MG tablet . She would like for someont o call her back. LORazepam (ATIVAN) 0.5 MG tablet

## 2021-08-30 NOTE — TELEPHONE ENCOUNTER
Howie Dhillon from [place] [address] called stating Chiquita Beryr is requesting an early refill on lorazepam 0.5 mg. She only gets one week at a time. Her last refill was on 8/27/2021. She is requesting more lorazepam today. Chiquita Berry told the pharmacist that she has taken a few extra pills. Please advise. Thanks.           [facility] -  [phone]

## 2021-09-03 ENCOUNTER — TELEPHONE (OUTPATIENT)
Dept: FAMILY MEDICINE CLINIC | Age: 81
End: 2021-09-03

## 2021-09-03 DIAGNOSIS — F41.1 ANXIETY STATE: Primary | ICD-10-CM

## 2021-09-03 NOTE — TELEPHONE ENCOUNTER
Abundiolina Acunachace called to ask if Dr. Francois Vega can up her dose of Lorazepam (Ativan) as she believes she is going through withdrawl. She said that she needs a refill as she is running out but didn't understand why. She then mentioned she that taking a half pill in the morning and another half at night. I informed her that the Rx was only written for a half pill once a day. She seemed very confused by this. When I asked her if she would take the medication as prescribed from now on she stated that she would not because she needs more of the medication. She also asked me to tell Dr. Francois Veag that the \"withdrawal symptoms aren't fun\". Please advise, thanks.   [phone]

## 2021-09-06 DIAGNOSIS — E78.00 PURE HYPERCHOLESTEROLEMIA: ICD-10-CM

## 2021-09-07 RX ORDER — FENOFIBRATE 134 MG/1
CAPSULE ORAL
Qty: 30 CAPSULE | Refills: 5 | Status: SHIPPED | OUTPATIENT
Start: 2021-09-07 | End: 2021-09-21 | Stop reason: SDUPTHER

## 2021-09-07 RX ORDER — LORAZEPAM 0.5 MG/1
0.25 TABLET ORAL EVERY OTHER DAY
Qty: 2 TABLET | Refills: 0 | Status: SHIPPED | OUTPATIENT
Start: 2021-09-07 | End: 2021-09-21 | Stop reason: ALTCHOICE

## 2021-09-07 NOTE — TELEPHONE ENCOUNTER
Requested Prescriptions     Pending Prescriptions Disp Refills    fenofibrate micronized (LOFIBRA) 134 MG capsule [Pharmacy Med Name: FENOFIBRATE 134 MG CAPSULE] 30 capsule 5     Sig: TAKE 1 CAPSULE BY MOUTH EVERY DAY IN THE MORNING BEFORE BREAKFAST       LOV 8/26/2021  No f/u  Labs 7/4/21

## 2021-09-07 NOTE — TELEPHONE ENCOUNTER
Per last phone message I want her to wean off it.    She can refill rx  Tomorrow for 1/2 tab every other day x 4 days then d/c

## 2021-09-08 NOTE — TELEPHONE ENCOUNTER
I want her to start Buspirone 5 mg twice a day. I ordered this in April and she never took it. If she no longer has the medication at home let me know and I will order it. Schedule appt with me next week.

## 2021-09-09 RX ORDER — BUSPIRONE HYDROCHLORIDE 5 MG/1
5 TABLET ORAL 2 TIMES DAILY
Qty: 60 TABLET | Refills: 2 | Status: SHIPPED | OUTPATIENT
Start: 2021-09-09 | End: 2021-09-21

## 2021-09-09 NOTE — TELEPHONE ENCOUNTER
Pt does not have this med at home so will need it ordered  She would also like to know if the visit next week can be virtual

## 2021-09-19 NOTE — PROGRESS NOTES
Angeli Low is a 80 y.o. female evaluated via telephone on 9/21/2021. Consent:  She and/or health care decision maker is aware that that she may receive a bill for this telephone service, depending on her insurance coverage, and has provided verbal consent to proceed: Yes      Documentation:  I communicated with the patient and/or health care decision maker about The primary encounter diagnosis was Severe episode of recurrent major depressive disorder, without psychotic features ([address] A diagnosis of Anxiety state was also pertinent to this visit. CAIM:  Gradually weaned off Ativan due to misuse, falls and risk of use at her age. Buspar was started and Lexapro was continued. She admits today she feels pretty good. She feels anxious often, erum worries about her . She stopped taking the Lexapro a few months ago. She would like to go back on it - it did help. She does not feel worse or better since off the Ativan. She stopped Buspar - made her sedated. Omaira Juana complains of her balance slowly getting worse. She is trying to walk more around the house and in the yard. She has not had any falls. Her  did PT for balance and can teach her exercise. Constipation. Was straining. Has itchy hemorrhoid. No bleeding. Is still protruding. Constipation resolved with MiraLax. The itching is gone but it is still protruding  Is using Prep H.          Outpatient Medications Marked as Taking for the 9/21/21 encounter (Virtual Visit) with Kia Munguia MD   Medication Sig Dispense Refill    fenofibrate micronized (LOFIBRA) 134 MG capsule TAKE 1 CAPSULE BY MOUTH EVERY DAY IN THE MORNING BEFORE BREAKFAST 30 capsule 5    levothyroxine (SYNTHROID) 100 MCG tablet TAKE 1 TABLET BY MOUTH EVERY DAY 90 tablet 2    triamcinolone acetonide (KENALOG) 0.1 % paste APPLY TO ORAL LESION 3 TIMES A DAY AS NEEDED 5 g 1    ondansetron (ZOFRAN-ODT) 4 MG disintegrating tablet TAKE 1 TABLET BY MOUTH EVERY 8 HOURS AS NEEDED FOR NAUSEA AND VOMITING 30 tablet 5    ZINC PO Take 1 tablet by mouth daily      Fexofenadine HCl (ALLEGRA PO) Take by mouth      Specialty Vitamins Products (ONE-A-DAY BONE STRENGTH PO) Take by mouth      omeprazole (PRILOSEC) 20 MG delayed release capsule TAKE 1 CAPSULE BY MOUTH DAILY 90 capsule 3    acetaminophen (TYLENOL) 325 MG tablet Take 975 mg by mouth every 8 hours as needed for Pain      vitamin C (ASCORBIC ACID) 500 MG tablet Take 500 mg by mouth daily      vitamin D (CHOLECALCIFEROL) 1000 UNIT TABS tablet Take 2,000 Units by mouth daily       Coenzyme Q10-Fish Oil-Vit E (CO-Q 10 OMEGA-3 FISH OIL) CAPS Take 1 capsule by mouth daily.  Magnesium 100 MG CAPS Take 1 tablet by mouth daily.  Capsicum, Cayenne, (CAYENNE PEPPER PO) Take  by mouth.  Nutritional Supplements (JUICE PLUS FIBRE PO) Take 1 tablet by mouth daily.  Calcium & Magnesium Carbonates 311-232 MG CAPS Take 1 tablet by mouth 2 times daily. Details of this discussion including any medical advice provided: \   Diagnosis Orders   1. Anxiety state  escitalopram (LEXAPRO) 10 MG tablet  Resume Lexapro  Doing well off Ativan. 2. Moderate episode of recurrent major depressive disorder (HCC)  escitalopram (LEXAPRO) 10 MG tablet   3. Pure hypercholesterolemia  fenofibrate micronized (LOFIBRA) 134 MG capsule   4. Hemorrhoids, external  hydrocortisone 2.5 % cream     5.flu vaccine - she will come in for a nurse visit  6. Constipation - continue prn MiraLax  7. Gait apraxia - recommend PT. She prefers to have her  work with the HEP he has       I affirm this is a Patient Initiated Episode with a Patient who has not had a related appointment within my department in the past 7 days or scheduled within the next 24 hours.     Patient identification was verified at the start of the visit: Yes    Total Time: minutes: 11-20 minutes    The visit was conducted pursuant to the emergency declaration under the [facility], [address] [place] [address] waiver authority and the [employer] General Act. Patient identification was verified, and a caregiver was present when appropriate. The patient was located in a state where the provider was credentialed to provide care.     Note: not billable if this call serves to triage the patient into an appointment for the relevant concern      Monika Ramírez MD

## 2021-09-21 ENCOUNTER — VIRTUAL VISIT (OUTPATIENT)
Dept: FAMILY MEDICINE CLINIC | Age: 81
End: 2021-09-21
Payer: MEDICARE

## 2021-09-21 DIAGNOSIS — F41.1 ANXIETY STATE: Primary | Chronic | ICD-10-CM

## 2021-09-21 DIAGNOSIS — R48.2 GAIT APRAXIA: ICD-10-CM

## 2021-09-21 DIAGNOSIS — Z23 NEED FOR INFLUENZA VACCINATION: ICD-10-CM

## 2021-09-21 DIAGNOSIS — F33.1 MODERATE EPISODE OF RECURRENT MAJOR DEPRESSIVE DISORDER (HCC): ICD-10-CM

## 2021-09-21 DIAGNOSIS — K64.4 HEMORRHOIDS, EXTERNAL: ICD-10-CM

## 2021-09-21 DIAGNOSIS — E78.00 PURE HYPERCHOLESTEROLEMIA: ICD-10-CM

## 2021-09-21 PROCEDURE — 99442 PR PHYS/QHP TELEPHONE EVALUATION 11-20 MIN: CPT | Performed by: FAMILY MEDICINE

## 2021-09-21 RX ORDER — FENOFIBRATE 134 MG/1
CAPSULE ORAL
Qty: 90 CAPSULE | Refills: 1 | Status: SHIPPED | OUTPATIENT
Start: 2021-09-21 | End: 2022-08-10 | Stop reason: SDUPTHER

## 2021-09-21 RX ORDER — ESCITALOPRAM OXALATE 10 MG/1
TABLET ORAL
Qty: 90 TABLET | Refills: 1 | Status: SHIPPED | OUTPATIENT
Start: 2021-09-21 | End: 2022-08-10 | Stop reason: SDUPTHER

## 2021-10-14 ENCOUNTER — IMMUNIZATION (OUTPATIENT)
Dept: FAMILY MEDICINE CLINIC | Age: 81
End: 2021-10-14
Payer: MEDICARE

## 2021-10-14 DIAGNOSIS — Z23 FLU VACCINE NEED: Primary | ICD-10-CM

## 2021-10-14 PROCEDURE — G0008 ADMIN INFLUENZA VIRUS VAC: HCPCS | Performed by: FAMILY MEDICINE

## 2021-10-14 PROCEDURE — 90694 VACC AIIV4 NO PRSRV 0.5ML IM: CPT | Performed by: FAMILY MEDICINE

## 2022-04-04 RX ORDER — TRIAMCINOLONE ACETONIDE 0.1 %
PASTE (GRAM) DENTAL
Qty: 5 G | Refills: 1 | Status: SHIPPED | OUTPATIENT
Start: 2022-04-04

## 2022-04-18 DIAGNOSIS — E03.9 ACQUIRED HYPOTHYROIDISM: ICD-10-CM

## 2022-04-18 RX ORDER — LEVOTHYROXINE SODIUM 0.1 MG/1
TABLET ORAL
Qty: 90 TABLET | Refills: 0 | Status: SHIPPED | OUTPATIENT
Start: 2022-04-18 | End: 2022-08-01

## 2022-04-18 NOTE — TELEPHONE ENCOUNTER
Requested Prescriptions     Pending Prescriptions Disp Refills    levothyroxine (SYNTHROID) 100 MCG tablet [Pharmacy Med Name: LEVOTHYROXINE 100 MCG TABLET] 90 tablet 2     Sig: TAKE 1 TABLET BY MOUTH EVERY DAY     Last ov 9/21/2021  Last labs 7/4/21

## 2022-07-22 ENCOUNTER — HOSPITAL ENCOUNTER (EMERGENCY)
Age: 82
Discharge: HOME | End: 2022-07-22
Attending: STUDENT IN AN ORGANIZED HEALTH CARE EDUCATION/TRAINING PROGRAM
Payer: MEDICARE

## 2022-07-22 ENCOUNTER — APPOINTMENT (OUTPATIENT)
Dept: CT IMAGING | Age: 82
End: 2022-07-22
Payer: MEDICARE

## 2022-07-22 VITALS
DIASTOLIC BLOOD PRESSURE: 78 MMHG | OXYGEN SATURATION: 99 % | TEMPERATURE: 98.4 F | HEART RATE: 69 BPM | RESPIRATION RATE: 18 BRPM | SYSTOLIC BLOOD PRESSURE: 173 MMHG

## 2022-07-22 DIAGNOSIS — R31.9 URINARY TRACT INFECTION WITH HEMATURIA, SITE UNSPECIFIED: Primary | ICD-10-CM

## 2022-07-22 DIAGNOSIS — M54.41 ACUTE RIGHT-SIDED LOW BACK PAIN WITH RIGHT-SIDED SCIATICA: ICD-10-CM

## 2022-07-22 DIAGNOSIS — N39.0 URINARY TRACT INFECTION WITH HEMATURIA, SITE UNSPECIFIED: Primary | ICD-10-CM

## 2022-07-22 LAB
ALBUMIN SERPL-MCNC: 4.1 G/DL (ref 3.4–5)
ALP BLD-CCNC: 78 U/L (ref 40–129)
ALT SERPL-CCNC: 13 U/L (ref 10–40)
ANION GAP SERPL CALCULATED.3IONS-SCNC: 10 MMOL/L (ref 3–16)
AST SERPL-CCNC: 21 U/L (ref 15–37)
BACTERIA: ABNORMAL /HPF
BASOPHILS ABSOLUTE: 0.1 K/UL (ref 0–0.2)
BASOPHILS RELATIVE PERCENT: 0.9 %
BILIRUB SERPL-MCNC: 0.4 MG/DL (ref 0–1)
BILIRUBIN DIRECT: <0.2 MG/DL (ref 0–0.3)
BILIRUBIN URINE: NEGATIVE
BILIRUBIN, INDIRECT: NORMAL MG/DL (ref 0–1)
BLOOD, URINE: ABNORMAL
BUN BLDV-MCNC: 23 MG/DL (ref 7–20)
CALCIUM SERPL-MCNC: 9.5 MG/DL (ref 8.3–10.6)
CHLORIDE BLD-SCNC: 100 MMOL/L (ref 99–110)
CLARITY: CLEAR
CO2: 27 MMOL/L (ref 21–32)
COLOR: YELLOW
CREAT SERPL-MCNC: 0.9 MG/DL (ref 0.6–1.2)
EOSINOPHILS ABSOLUTE: 0.2 K/UL (ref 0–0.6)
EOSINOPHILS RELATIVE PERCENT: 2.6 %
EPITHELIAL CELLS, UA: ABNORMAL /HPF (ref 0–5)
GFR AFRICAN AMERICAN: >60
GFR NON-AFRICAN AMERICAN: >60
GLUCOSE BLD-MCNC: 91 MG/DL (ref 70–99)
GLUCOSE URINE: NEGATIVE MG/DL
HCT VFR BLD CALC: 41.2 % (ref 36–48)
HEMOGLOBIN: 13.4 G/DL (ref 12–16)
KETONES, URINE: NEGATIVE MG/DL
LEUKOCYTE ESTERASE, URINE: ABNORMAL
LIPASE: 53 U/L (ref 13–60)
LYMPHOCYTES ABSOLUTE: 1.7 K/UL (ref 1–5.1)
LYMPHOCYTES RELATIVE PERCENT: 26 %
MCH RBC QN AUTO: 27 PG (ref 26–34)
MCHC RBC AUTO-ENTMCNC: 32.5 G/DL (ref 31–36)
MCV RBC AUTO: 83.2 FL (ref 80–100)
MICROSCOPIC EXAMINATION: YES
MONOCYTES ABSOLUTE: 0.8 K/UL (ref 0–1.3)
MONOCYTES RELATIVE PERCENT: 12.5 %
NEUTROPHILS ABSOLUTE: 3.7 K/UL (ref 1.7–7.7)
NEUTROPHILS RELATIVE PERCENT: 58 %
NITRITE, URINE: NEGATIVE
PDW BLD-RTO: 15.4 % (ref 12.4–15.4)
PH UA: 7 (ref 5–8)
PLATELET # BLD: 275 K/UL (ref 135–450)
PMV BLD AUTO: 10.2 FL (ref 5–10.5)
POTASSIUM REFLEX MAGNESIUM: 3.8 MMOL/L (ref 3.5–5.1)
PROTEIN UA: 30 MG/DL
RBC # BLD: 4.96 M/UL (ref 4–5.2)
RBC UA: ABNORMAL /HPF (ref 0–4)
SODIUM BLD-SCNC: 137 MMOL/L (ref 136–145)
SPECIFIC GRAVITY UA: 1.02 (ref 1–1.03)
TOTAL PROTEIN: 6.7 G/DL (ref 6.4–8.2)
URINE TYPE: ABNORMAL
UROBILINOGEN, URINE: 0.2 E.U./DL
WBC # BLD: 6.4 K/UL (ref 4–11)
WBC UA: ABNORMAL /HPF (ref 0–5)

## 2022-07-22 PROCEDURE — 6370000000 HC RX 637 (ALT 250 FOR IP): Performed by: PHYSICIAN ASSISTANT

## 2022-07-22 PROCEDURE — 99285 EMERGENCY DEPT VISIT HI MDM: CPT

## 2022-07-22 PROCEDURE — 85025 COMPLETE CBC W/AUTO DIFF WBC: CPT

## 2022-07-22 PROCEDURE — 83690 ASSAY OF LIPASE: CPT

## 2022-07-22 PROCEDURE — 80048 BASIC METABOLIC PNL TOTAL CA: CPT

## 2022-07-22 PROCEDURE — 6360000004 HC RX CONTRAST MEDICATION: Performed by: STUDENT IN AN ORGANIZED HEALTH CARE EDUCATION/TRAINING PROGRAM

## 2022-07-22 PROCEDURE — 81001 URINALYSIS AUTO W/SCOPE: CPT

## 2022-07-22 PROCEDURE — 6370000000 HC RX 637 (ALT 250 FOR IP): Performed by: STUDENT IN AN ORGANIZED HEALTH CARE EDUCATION/TRAINING PROGRAM

## 2022-07-22 PROCEDURE — 74177 CT ABD & PELVIS W/CONTRAST: CPT

## 2022-07-22 PROCEDURE — 80076 HEPATIC FUNCTION PANEL: CPT

## 2022-07-22 RX ORDER — LIDOCAINE 4 G/G
1 PATCH TOPICAL DAILY
Status: DISCONTINUED | OUTPATIENT
Start: 2022-07-22 | End: 2022-07-22 | Stop reason: HOSPADM

## 2022-07-22 RX ORDER — LIDOCAINE 50 MG/G
1 PATCH TOPICAL DAILY
Qty: 7 PATCH | Refills: 0 | Status: SHIPPED | OUTPATIENT
Start: 2022-07-22 | End: 2022-07-29

## 2022-07-22 RX ORDER — CEFDINIR 300 MG/1
300 CAPSULE ORAL 2 TIMES DAILY
Qty: 14 CAPSULE | Refills: 0 | Status: SHIPPED | OUTPATIENT
Start: 2022-07-22 | End: 2022-07-29

## 2022-07-22 RX ORDER — CEFDINIR 300 MG/1
300 CAPSULE ORAL ONCE
Status: COMPLETED | OUTPATIENT
Start: 2022-07-22 | End: 2022-07-22

## 2022-07-22 RX ADMIN — IOPAMIDOL 80 ML: 755 INJECTION, SOLUTION INTRAVENOUS at 17:38

## 2022-07-22 RX ADMIN — CEFDINIR 300 MG: 300 CAPSULE ORAL at 19:09

## 2022-07-22 ASSESSMENT — ENCOUNTER SYMPTOMS
SHORTNESS OF BREATH: 0
RHINORRHEA: 0
NAUSEA: 0
COUGH: 0
ABDOMINAL PAIN: 0
BACK PAIN: 1
SORE THROAT: 0
DIARRHEA: 0
VOMITING: 0
EYES NEGATIVE: 1
CONSTIPATION: 0

## 2022-07-22 NOTE — ED TRIAGE NOTES
Patient presents to the ED with complaints of \"right hip pain\" that she states is \"more in my flank\". Patient denies trauma, states \"I think I have a UTI or a kidney stone\". Patient states she has a history of a kidney stone, but she does not know \"if I passed it or if it's still in there\". Patient denies burning with urination or malodorous urine. States she does feel as if she is emptying her bladder, but states that she just feels as if she is going more frequently. States she has been having pain \"for a few weeks\" but has not followed up with her PCP.  Hx of UTI as well as the kidney stone

## 2022-07-22 NOTE — ED PROVIDER NOTES
[facility] [address] [facility]          PHYSICIAN ASSISTANT NOTE       Date of evaluation: 7/22/2022    Chief Complaint     Flank Pain      History of Present Illness     Jennifer Ricardo is a [de-identified] y.o. female who presents with flank pain. Patient reports that for a long period of time, she has been having pain in her right lower back that radiates into her leg and stops at her knee. She was previously seen and told that she had a stone in her kidney and is here to have it checked as she has not had it checked in a while. She reports she is not going to her primary care physician currently due to [place] pandemic. States that she has been limping because of the pain in her right leg. The pain stops when sitting and is only painful when standing on her leg. Denies any trauma. Has been taking Tylenol for the pain with some relief. Last dose was yesterday evening. Endorses urgency for \"a while. \"  Denies any abdominal pain, but does request an x-ray of her stomach as she would like this to rule out any \"tumors\". Denies any changes in bowel or bladder habits. Patient denies any extremity numbness, weakness, paresthesias. Denies bowel or bladder incontinence or retention or saddle anesthesia. Denies any history of IV drug use. Denies fevers or chills. Review of Systems     Review of Systems   Constitutional:  Negative for chills and fever. HENT:  Negative for congestion, rhinorrhea and sore throat. Eyes: Negative. Respiratory:  Negative for cough and shortness of breath. Cardiovascular:  Negative for chest pain. Gastrointestinal:  Negative for abdominal pain, constipation, diarrhea, nausea and vomiting. Genitourinary:  Positive for urgency. Negative for dysuria, hematuria, vaginal bleeding, vaginal discharge and vaginal pain. Musculoskeletal:  Positive for back pain. Skin: Negative. Neurological:  Negative for weakness and numbness.       Past Medical, Surgical, Family, and Social History     She has a past medical history of Anal pruritus, Closed fracture of multiple ribs of right side with routine healing, Diastolic dysfunction, Falls, Gastrocnemius muscle rupture, Hemopneumothorax on right, Lung nodules, Postmenopausal atrophic vaginitis, Pruritic disorder, Seborrheic keratosis, and Shingles. She has a past surgical history that includes Tonsillectomy. Her family history includes Thyroid Cancer (age of onset: 52) in her daughter. She reports that she has never smoked. She has never used smokeless tobacco. She reports that she does not drink alcohol and does not use drugs. Medications     Previous Medications    ACETAMINOPHEN (TYLENOL) 325 MG TABLET    Take 975 mg by mouth every 8 hours as needed for Pain    CALCIUM & MAGNESIUM CARBONATES 311-232 MG CAPS    Take 1 tablet by mouth 2 times daily. CAPSICUM, CAYENNE, (CAYENNE PEPPER PO)    Take  by mouth. COENZYME Q10-FISH OIL-VIT E (CO-Q 10 OMEGA-3 FISH OIL) CAPS    Take 1 capsule by mouth daily. ESCITALOPRAM (LEXAPRO) 10 MG TABLET    TAKE 1 TABLET BY MOUTH EVERY DAY    FENOFIBRATE MICRONIZED (LOFIBRA) 134 MG CAPSULE    TAKE 1 CAPSULE BY MOUTH EVERY DAY IN THE MORNING BEFORE BREAKFAST    FEXOFENADINE HCL (ALLEGRA PO)    Take by mouth    HYDROCORTISONE 2.5 % CREAM    Apply topically 2 times daily Apply topically 2 times daily. LEVOTHYROXINE (SYNTHROID) 100 MCG TABLET    TAKE 1 TABLET BY MOUTH EVERY DAY    MAGNESIUM 100 MG CAPS    Take 1 tablet by mouth daily. NUTRITIONAL SUPPLEMENTS (JUICE PLUS FIBRE PO)    Take 1 tablet by mouth daily.     OMEPRAZOLE (PRILOSEC) 20 MG DELAYED RELEASE CAPSULE    TAKE 1 CAPSULE BY MOUTH DAILY    ONDANSETRON (ZOFRAN-ODT) 4 MG DISINTEGRATING TABLET    TAKE 1 TABLET BY MOUTH EVERY 8 HOURS AS NEEDED FOR NAUSEA AND VOMITING    SPECIALTY VITAMINS PRODUCTS (ONE-A-DAY BONE STRENGTH PO)    Take by mouth    TRIAMCINOLONE ACETONIDE (KENALOG) 0.1 % PASTE    APPLY TO ORAL LESION 3 TIMES A DAY AS NEEDED    VITAMIN C (ASCORBIC ACID) 500 MG TABLET    Take 500 mg by mouth daily    VITAMIN D (CHOLECALCIFEROL) 1000 UNIT TABS TABLET    Take 2,000 Units by mouth daily     ZINC PO    Take 1 tablet by mouth daily       Allergies     She is allergic to biaxin [clarithromycin], erythromycin, lescol, lipitor, macrodantin [nitrofurantoin], paxil [paroxetine], zoloft [sertraline hcl], bactrim [sulfamethoxazole-trimethoprim], biaxin [clarithromycin], ciprofloxacin, and erythromycin. Physical Exam     INITIAL VITALS: BP: (!) 188/81, Temp: 98.4 °F (36.9 °C), Heart Rate: 69, Resp: 18, SpO2: 97 %  Physical Exam  Constitutional:       General: She is not in acute distress. Appearance: Normal appearance. She is not ill-appearing, toxic-appearing or diaphoretic. HENT:      Head: Normocephalic and atraumatic. Eyes:      General:         Right eye: No discharge. Left eye: No discharge. Cardiovascular:      Rate and Rhythm: Normal rate and regular rhythm. Heart sounds: Normal heart sounds. No murmur heard. No friction rub. No gallop. Pulmonary:      Effort: Pulmonary effort is normal. No respiratory distress. Breath sounds: Normal breath sounds. No stridor. No wheezing, rhonchi or rales. Abdominal:      General: Abdomen is flat. There is no distension. Palpations: Abdomen is soft. There is no mass. Tenderness: There is no abdominal tenderness. There is no right CVA tenderness, left CVA tenderness, guarding or rebound. Hernia: No hernia is present. Musculoskeletal:         General: No swelling, tenderness, deformity or signs of injury. Comments: Moving all extremities spontaneously. No midline tenderness, step-offs, or deformities to cervical, thoracic, or lumbar spine. No paraspinal musculature tenderness. No erythema or edema or other obvious skin changes to back. Skin:     General: Skin is warm and dry.       Findings: No bruising, erythema, lesion or rash.   Neurological:      General: No focal deficit present. Mental Status: She is alert and oriented to person, place, and time. Sensory: No sensory deficit. Motor: No weakness. Comments: 5/5 strength and sensation intact in bilateral lower extremities. Antalgic gait.    Psychiatric:         Mood and Affect: Mood normal.         Behavior: Behavior normal.       Diagnostic Results     EKG   None    RADIOLOGY:  CT ABDOMEN PELVIS W IV CONTRAST Additional Contrast? None    (Results Pending)       LABS:   Results for orders placed or performed during the hospital encounter of 07/22/22   CBC with Auto Differential   Result Value Ref Range    WBC 6.4 4.0 - 11.0 K/uL    RBC 4.96 4.00 - 5.20 M/uL    Hemoglobin 13.4 12.0 - 16.0 g/dL    Hematocrit 41.2 36.0 - 48.0 %    MCV 83.2 80.0 - 100.0 fL    MCH 27.0 26.0 - 34.0 pg    MCHC 32.5 31.0 - 36.0 g/dL    RDW 15.4 12.4 - 15.4 %    Platelets 354 184 - 852 K/uL    MPV 10.2 5.0 - 10.5 fL    Neutrophils % 58.0 %    Lymphocytes % 26.0 %    Monocytes % 12.5 %    Eosinophils % 2.6 %    Basophils % 0.9 %    Neutrophils Absolute 3.7 1.7 - 7.7 K/uL    Lymphocytes Absolute 1.7 1.0 - 5.1 K/uL    Monocytes Absolute 0.8 0.0 - 1.3 K/uL    Eosinophils Absolute 0.2 0.0 - 0.6 K/uL    Basophils Absolute 0.1 0.0 - 0.2 K/uL   Basic Metabolic Panel w/ Reflex to MG   Result Value Ref Range    Sodium 137 136 - 145 mmol/L    Potassium reflex Magnesium 3.8 3.5 - 5.1 mmol/L    Chloride 100 99 - 110 mmol/L    CO2 27 21 - 32 mmol/L    Anion Gap 10 3 - 16    Glucose 91 70 - 99 mg/dL    BUN 23 (H) 7 - 20 mg/dL    Creatinine 0.9 0.6 - 1.2 mg/dL    GFR Non-African American >60 >60    GFR African American >60 >60    Calcium 9.5 8.3 - 10.6 mg/dL   Hepatic Function Panel   Result Value Ref Range    Total Protein 6.7 6.4 - 8.2 g/dL    Albumin 4.1 3.4 - 5.0 g/dL    Alkaline Phosphatase 78 40 - 129 U/L    ALT 13 10 - 40 U/L    AST 21 15 - 37 U/L    Total Bilirubin 0.4 0.0 - 1.0 mg/dL Bilirubin, Direct <0.2 0.0 - 0.3 mg/dL    Bilirubin, Indirect see below 0.0 - 1.0 mg/dL   Lipase   Result Value Ref Range    Lipase 53.0 13.0 - 60.0 U/L   Urinalysis   Result Value Ref Range    Color, UA Yellow Straw/Yellow    Clarity, UA Clear Clear    Glucose, Ur Negative Negative mg/dL    Bilirubin Urine Negative Negative    Ketones, Urine Negative Negative mg/dL    Specific Gravity, UA 1.020 1.005 - 1.030    Blood, Urine TRACE-INTACT (A) Negative    pH, UA 7.0 5.0 - 8.0    Protein, UA 30 (A) Negative mg/dL    Urobilinogen, Urine 0.2 <2.0 E.U./dL    Nitrite, Urine Negative Negative    Leukocyte Esterase, Urine MODERATE (A) Negative    Microscopic Examination YES     Urine Type NotGiven    Microscopic Urinalysis   Result Value Ref Range    WBC, UA 21-50 (A) 0 - 5 /HPF    RBC, UA 5-10 (A) 0 - 4 /HPF    Epithelial Cells, UA 2-5 0 - 5 /HPF    Bacteria, UA 2+ (A) None Seen /HPF       ED BEDSIDE ULTRASOUND:  None    RECENT VITALS:  BP: (!) 173/78, Temp: 98.4 °F (36.9 °C), Heart Rate: 69, Resp: 18, SpO2: 99 %     Procedures     Procedures  None    ED Course     Nursing Notes, Past Medical Hx,Past Surgical Hx, Social Hx, Allergies, and Family Hx were reviewed. The patient was given the following medications:  Orders Placed This Encounter   Medications    lidocaine 4 % external patch 1 patch    iopamidol (ISOVUE-370) 76 % injection 80 mL         CONSULTS:  None    MEDICAL DECISION MAKING / ASSESSMENT / Brigida Milligan is a [de-identified] y.o. female with history of anxiety, hypothyroidism, lumbar spinal stenosis, patellofemoral arthritis, and recurrent major depressive disorder who presents with pelvis right lower back pain that radiates into her right leg, stopping at her knee with a history of a stone in her kidney for which she is wanting evaluation of. On exam, patient is hypertensive with BP of 173/78 and no signs of respiratory distress. No abdominal tenderness or CVA tenderness.   No midline tenderness, step-offs, or deformities to cervical, thoracic, or lumbar spine. Strength and sensation intact in bilateral lower extremities with antalgic gait. On 6/7/22, patient was seen for left flank pain where it was noted that she has significant UTI and was given Omnicef. She did have a large kidney stone in right kidney. Final diagnosis wounds acute UTI, asymptomatic kidney stone, and left flank pain, suspected musculoskeletal etiology. Patient received lidoderm patch for the pain. Lipase and hepatic function panel WNL. No leukocytosis. No electrolyte abnormalities. As urine is leukocyte positive with 21-50 WBCs, urine culture sent and pending. Urine with 5-10 RBCs and history of kidney stone, so CT abdomen and pelvic ordered and is pending. Although patient has concern for abdominal cancer, she states there are no specific symptoms she is concerned about except for having a history of GERD and feeling she needs a more in-depth evaluation as she has not seen her PCP in awhile due to the [place] pandemic. At this time I am going off service and will be signing out care of the patient to my colleague JENNI Ling for further care. CT abdomen and pelvis is/are still pending. Oncoming provider responsibilities include following up on pending labs/tests, reassessing patient, and appropriate disposition. Please see medical record for further management and medical decision making. The patient was evaluated by myself and the ED Attending Physician, Dr. Sanchez Burden. All management and disposition plans were discussed and agreed upon. This note was dictated using voice-recognition software, which occasionally leads to inadvertent typographic errors. Clinical Impression     No diagnosis found.     Disposition     DISPOSITION     Pending     [place], [place]  07/22/22 [id number]

## 2022-07-22 NOTE — ED PROVIDER NOTES
[address] [facility] ENCOUNTER          PHYSICIAN ASSISTANT NOTE     Date of evaluation: 7/22/2022    ADDENDUM:      Care of this patient was assumed from [place], [place]. The patient was seen for Flank Pain  . The patient's initial evaluation and plan have been discussed with the prior provider who initially evaluated the patient. Nursing Notes, Past Medical Hx, Past Surgical Hx, Social Hx, Allergies, and Family Hx were all reviewed. Diagnostic Results     RADIOLOGY:  CT ABDOMEN PELVIS W IV CONTRAST Additional Contrast? None   Final Result      17 mm stone within the right renal pelvis without obstruction. Moderate-sized hiatal hernia. No acute intra-abdominal process.           LABS:   Results for orders placed or performed during the hospital encounter of 07/22/22   CBC with Auto Differential   Result Value Ref Range    WBC 6.4 4.0 - 11.0 K/uL    RBC 4.96 4.00 - 5.20 M/uL    Hemoglobin 13.4 12.0 - 16.0 g/dL    Hematocrit 41.2 36.0 - 48.0 %    MCV 83.2 80.0 - 100.0 fL    MCH 27.0 26.0 - 34.0 pg    MCHC 32.5 31.0 - 36.0 g/dL    RDW 15.4 12.4 - 15.4 %    Platelets 371 785 - 907 K/uL    MPV 10.2 5.0 - 10.5 fL    Neutrophils % 58.0 %    Lymphocytes % 26.0 %    Monocytes % 12.5 %    Eosinophils % 2.6 %    Basophils % 0.9 %    Neutrophils Absolute 3.7 1.7 - 7.7 K/uL    Lymphocytes Absolute 1.7 1.0 - 5.1 K/uL    Monocytes Absolute 0.8 0.0 - 1.3 K/uL    Eosinophils Absolute 0.2 0.0 - 0.6 K/uL    Basophils Absolute 0.1 0.0 - 0.2 K/uL   Basic Metabolic Panel w/ Reflex to MG   Result Value Ref Range    Sodium 137 136 - 145 mmol/L    Potassium reflex Magnesium 3.8 3.5 - 5.1 mmol/L    Chloride 100 99 - 110 mmol/L    CO2 27 21 - 32 mmol/L    Anion Gap 10 3 - 16    Glucose 91 70 - 99 mg/dL    BUN 23 (H) 7 - 20 mg/dL    Creatinine 0.9 0.6 - 1.2 mg/dL    GFR Non-African American >60 >60    GFR African American >60 >60    Calcium 9.5 8.3 - 10.6 mg/dL   Hepatic Function Panel   Result Value Ref Range    Total Protein 6.7 6.4 - 8.2 g/dL    Albumin 4.1 3.4 - 5.0 g/dL    Alkaline Phosphatase 78 40 - 129 U/L    ALT 13 10 - 40 U/L    AST 21 15 - 37 U/L    Total Bilirubin 0.4 0.0 - 1.0 mg/dL    Bilirubin, Direct <0.2 0.0 - 0.3 mg/dL    Bilirubin, Indirect see below 0.0 - 1.0 mg/dL   Lipase   Result Value Ref Range    Lipase 53.0 13.0 - 60.0 U/L   Urinalysis   Result Value Ref Range    Color, UA Yellow Straw/Yellow    Clarity, UA Clear Clear    Glucose, Ur Negative Negative mg/dL    Bilirubin Urine Negative Negative    Ketones, Urine Negative Negative mg/dL    Specific Gravity, UA 1.020 1.005 - 1.030    Blood, Urine TRACE-INTACT (A) Negative    pH, UA 7.0 5.0 - 8.0    Protein, UA 30 (A) Negative mg/dL    Urobilinogen, Urine 0.2 <2.0 E.U./dL    Nitrite, Urine Negative Negative    Leukocyte Esterase, Urine MODERATE (A) Negative    Microscopic Examination YES     Urine Type NotGiven    Microscopic Urinalysis   Result Value Ref Range    WBC, UA 21-50 (A) 0 - 5 /HPF    RBC, UA 5-10 (A) 0 - 4 /HPF    Epithelial Cells, UA 2-5 0 - 5 /HPF    Bacteria, UA 2+ (A) None Seen /HPF       RECENT VITALS:  BP: (!) 173/78, Temp: 98.4 °F (36.9 °C), Heart Rate: 69, Resp: 18, SpO2: 99 %     Procedures         ED Course     The patient was given the following medications:  Orders Placed This Encounter   Medications    lidocaine 4 % external patch 1 patch    iopamidol (ISOVUE-370) 76 % injection 80 mL    cefdinir (OMNICEF) 300 MG capsule     Sig: Take 1 capsule by mouth in the morning and 1 capsule before bedtime. Do all this for 7 days. Dispense:  14 capsule     Refill:  0    cefdinir (OMNICEF) capsule 300 mg     Order Specific Question:   Antimicrobial Indications     Answer:   Urinary Tract Infection    lidocaine (LIDODERM) 5 %     Sig: Place 1 patch onto the skin in the morning for 7 days. 12 hours on, 12 hours off. .     Dispense:  7 patch     Refill:  0       CONSULTS:  None    MEDICAL DECISION Radha Schneider / Timothy Givens / Penny Vallecillo Naila Gaines is a [de-identified] y.o. female with PMHx of lumbar stenosis, Hypothyroidism, kidney stone who presented to the ED with complaints of R lower back/flank pain with radiation into buttock and right leg. She states that this pain has been present for a long time. She also endorses chronic urinary urgency over the past few months. Previous provider's exam revealed mild RLQ abdominal tenderness. Labs obtained without acute findings. UA with trace blood and moderate leuks. Micro with 21-50 wbcs, 5-10 rbcs, 2+ bacteria. Urine sent to culture. CT A/P W IV contrast pending at time of shift change. This re-demonstrated a 1.7mm stone in the right renal pelvis without other acute findings. Will plan to treat UTI with Omnicef. Will refer patient to urology for evaluation of kidney stone. Patient will also be referred to her PCP for right lower back pain with sciatica. Patient instructed on tylenol and lidocaine patches for pain. Given lower back exercises. She is stable for discharge home with return precautions. This patient was also evaluated by the attending physician. All care plans were discussed and agreed upon. Clinical Impression     1. Urinary tract infection with hematuria, site unspecified    2.  Acute right-sided low back pain with right-sided sciatica        Disposition     PATIENT REFERRED TO:  Sasha Guevara MD  [facility] [address]  [address] [phone]    Schedule an appointment as soon as possible for a visit       [facility] [address] [place] [address]  [phone]  Schedule an appointment as soon as possible for a visit       [facility]  [address] [place] [id number] [phone]    If symptoms worsen    DISCHARGE MEDICATIONS:  Discharge Medication List as of 7/22/2022  7:09 PM        START taking these medications    Details   cefdinir (OMNICEF) 300 MG capsule Take 1 capsule by mouth in the morning and 1 capsule before bedtime. Do all this for 7 days. , Disp-14 capsule, R-0Print      lidocaine (LIDODERM) 5 % Place 1 patch onto the skin in the morning for 7 days. 12 hours on, 12 hours off. ., Disp-7 patch, R-0Print             DISPOSITION Decision To Discharge 07/22/2022 06:35:13 PM          Felicity Chang PA-C  07/23/22 0045

## 2022-07-22 NOTE — DISCHARGE INSTRUCTIONS
Take omincef twice daily for the next 7 days as prescribed. May continue tylenol 650mg every 6 hours as needed for pain. Remove lidocaine patch in 12 hours. Leave off for 12 hours. Then you may apply another. Follow up with your PCP regarding your back pain and a urologist.    Return to the ED with new or worsening symptoms.

## 2022-07-30 ENCOUNTER — TELEPHONE (OUTPATIENT)
Dept: FAMILY MEDICINE CLINIC | Age: 82
End: 2022-07-30

## 2022-07-30 DIAGNOSIS — E03.9 ACQUIRED HYPOTHYROIDISM: ICD-10-CM

## 2022-08-01 RX ORDER — LEVOTHYROXINE SODIUM 0.1 MG/1
TABLET ORAL
Qty: 90 TABLET | Refills: 0 | Status: SHIPPED | OUTPATIENT
Start: 2022-08-01 | End: 2022-11-01

## 2022-08-01 NOTE — TELEPHONE ENCOUNTER
Requested Prescriptions     Pending Prescriptions Disp Refills    levothyroxine (SYNTHROID) 100 MCG tablet [Pharmacy Med Name: LEVOTHYROXINE 100 MCG TABLET] 90 tablet 0     Sig: TAKE 1 TABLET BY MOUTH EVERY DAY     Last ov 9/21/21 VV  Last lab 7/22/22  Next ov 8/3/22

## 2022-08-07 NOTE — PROGRESS NOTES
Subjective:      Patient ID: Familia Sanchez [de-identified] y.o. female. The primary encounter diagnosis was Dysuria. Diagnoses of Sedative, hypnotic or anxiolytic dependence, uncomplicated (HCC), Moderate episode of recurrent major depressive disorder ([facility] [address] Renal insufficiency, mild, and Pure hypercholesterolemia were also pertinent to this visit. HPI    DUE COVID booster and Shingrix. Dysuria:  was in ER on 7/22/22 with right lower back and flank pain \"for a long time\". Chronic urinary urgency. UA + for leuk, RBC and bacteria. CT showed 1.7 cm stone right renal pelvis without obstruction and HH>   She was treated with Omnicef. Unfortunately urine cx was not done. Today:  had pain in right lower abdomen and right lower back - resolved with [facility] but is now back again x 3 days. + dysuria, frequency and urgency. No flank pain, hematuria, fever, nausea or vomiting. Does not always drink enough water. Also has urgency and urge incontinence for many years; would like to consider treatment. Sciatic pain since in the ER. Pain in right buttock radiating to just below the knee on the right. No weakness. Worse with standing or walking. No pain when sitting. Hurts to lie on either side but ok on her back. CAMI/depression: she was seen for home visit last summer; was not doing well. Taking more Ativan than prescribed and did not want to get out of bed per family. She refused psych evaluation. She was weaned off the Ativan; at appt one year ago was down to 1/2 BID and has not had Ativan since last September. She does take Lexapro. She states her anxiety is manageable. She has a chronic anxiety but not worse since off Ativan. She is sleeping well.   Appetite is         Lab Results   Component Value Date     07/22/2022    K 3.8 07/22/2022     07/22/2022    CO2 27 07/22/2022    BUN 23 (H) 07/22/2022    CREATININE 0.9 07/22/2022    GLUCOSE 91 07/22/2022    CALCIUM 9.5 07/22/2022    PROT 6.7 07/22/2022    LABALBU 4.1 07/22/2022    BILITOT 0.4 07/22/2022    ALKPHOS 78 07/22/2022    AST 21 07/22/2022    ALT 13 07/22/2022    LABGLOM >60 07/22/2022    GFRAA >60 07/22/2022    AGRATIO 1.5 07/04/2021    GLOB 2.6 07/04/2021        Lab Results   Component Value Date    WBC 6.4 07/22/2022    HGB 13.4 07/22/2022    HCT 41.2 07/22/2022    MCV 83.2 07/22/2022     07/22/2022     Lab Results   Component Value Date/Time    COLORU Yellow 07/22/2022 02:26 PM    NITRU Negative 07/22/2022 02:26 PM    GLUCOSEU Negative 07/22/2022 02:26 PM    GLUCOSEU Negative 03/29/2012 10:25 PM    KETUA Negative 07/22/2022 02:26 PM    UROBILINOGEN 0.2 07/22/2022 02:26 PM    BILIRUBINUR Negative 07/22/2022 02:26 PM    BILIRUBINUR Negative 08/05/2020 03:14 PM    BILIRUBINUR Negative 03/29/2012 10:25 PM     TSH   Date Value Ref Range Status   05/12/2021 4.53 (H) 0.27 - 4.20 uIU/mL Final   09/10/2020 0.54 0.27 - 4.20 uIU/mL Final   07/03/2019 1.75 0.27 - 4.20 uIU/mL Final   04/21/2015 1.34 0.27 - 4.20 uIU/mL Final   11/19/2012 2.19 0.35 - 5.5 uIU/ml Final   10/01/2012 0.41 0.35 - 5.5 uIU/ml Final   02/13/2012 0.46 0.35 - 5.5 uIU/ml Final     TSH, 3RD GENERATION   Date Value Ref Range Status   02/21/2021 3.82 0.45 - 4.12 uIU/mL Final      Lab Results   Component Value Date    CHOL 180 09/10/2020    CHOL 190 04/30/2019    CHOL 193 12/07/2017     Lab Results   Component Value Date    TRIG 73 09/10/2020    TRIG 81 04/30/2019    TRIG 94 12/07/2017     Lab Results   Component Value Date    HDL 71 (H) 09/10/2020    HDL 66 (H) 04/30/2019    HDL 69 (H) 12/07/2017     Lab Results   Component Value Date    LDLCALC 94 09/10/2020    LDLCALC 108 (H) 04/30/2019    LDLCALC 105 (H) 12/07/2017     Lab Results   Component Value Date    LABVLDL 15 09/10/2020    LABVLDL 16 04/30/2019    LABVLDL 19 12/07/2017     No results found for: [facility]   Outpatient Medications Marked as Taking for the 8/10/22 encounter (Office Visit) with Joselo Rollins, MD   Medication Sig Dispense Refill    levothyroxine (SYNTHROID) 100 MCG tablet TAKE 1 TABLET BY MOUTH EVERY DAY 90 tablet 0    triamcinolone acetonide (KENALOG) 0.1 % paste APPLY TO ORAL LESION 3 TIMES A DAY AS NEEDED 5 g 1    hydrocortisone 2.5 % cream Apply topically 2 times daily Apply topically 2 times daily. 15 g 2    escitalopram (LEXAPRO) 10 MG tablet TAKE 1 TABLET BY MOUTH EVERY DAY 90 tablet 1    fenofibrate micronized (LOFIBRA) 134 MG capsule TAKE 1 CAPSULE BY MOUTH EVERY DAY IN THE MORNING BEFORE BREAKFAST 90 capsule 1    ondansetron (ZOFRAN-ODT) 4 MG disintegrating tablet TAKE 1 TABLET BY MOUTH EVERY 8 HOURS AS NEEDED FOR NAUSEA AND VOMITING 30 tablet 5    ZINC PO Take 1 tablet by mouth daily      Fexofenadine HCl (ALLEGRA PO) Take by mouth      Specialty Vitamins Products (ONE-A-DAY BONE STRENGTH PO) Take by mouth      omeprazole (PRILOSEC) 20 MG delayed release capsule TAKE 1 CAPSULE BY MOUTH DAILY 90 capsule 3    acetaminophen (TYLENOL) 325 MG tablet Take 975 mg by mouth every 8 hours as needed for Pain      vitamin C (ASCORBIC ACID) 500 MG tablet Take 500 mg by mouth daily      vitamin D (CHOLECALCIFEROL) 1000 UNIT TABS tablet Take 2,000 Units by mouth daily       Coenzyme Q10-Fish Oil-Vit E (CO-Q 10 OMEGA-3 FISH OIL) CAPS Take 1 capsule by mouth daily. Magnesium 100 MG CAPS Take 1 tablet by mouth daily. Capsicum, Cayenne, (CAYENNE PEPPER PO) Take  by mouth. Nutritional Supplements (JUICE PLUS FIBRE PO) Take 1 tablet by mouth daily. Calcium & Magnesium Carbonates 311-232 MG CAPS Take 1 tablet by mouth 2 times daily.           Allergies   Allergen Reactions    Biaxin [Clarithromycin]     Erythromycin     Lescol     Lipitor     Macrodantin [Nitrofurantoin]     Paxil [Paroxetine]     Zoloft [Sertraline Hcl] Nausea Only and Other (See Comments)     C/o nausea and headaches     Bactrim [Sulfamethoxazole-Trimethoprim] Rash    Biaxin [Clarithromycin] Nausea Only    Ciprofloxacin Rash Pain under arms. Erythromycin Nausea Only       Patient Active Problem List   Diagnosis    Esophageal reflux    Anxiety state    Pure hypercholesterolemia    Hypothyroidism    Hydronephrosis    IBS (irritable bowel syndrome)    TMJ arthralgia    Lumbar spinal stenosis    Patellofemoral arthritis    Perennial allergic rhinitis    Eczema    Renal insufficiency, mild    Kidney stone    Benign head tremor    Severe episode of recurrent major depressive disorder, without psychotic features ([facility])    Sedative, hypnotic or anxiolytic dependence, uncomplicated    Gait apraxia       Past Medical History:   Diagnosis Date    Anal pruritus 7/21/2015    Closed fracture of multiple ribs of right side with routine healing 1/94/1543    Diastolic dysfunction 1/91/1237    Falls 07/13/2018    Gastrocnemius muscle rupture 7/18/2016    Hemopneumothorax on right 8/23/2018    Lung nodules 8/7/2017     CT chest 2/2018 stable; no follow up needed    Postmenopausal atrophic vaginitis 11/14/2011    Pruritic disorder 9/27/2013    Seborrheic keratosis 5/15/2013    Shingles 9/18/2014       Past Surgical History:   Procedure Laterality Date    TONSILLECTOMY          Family History   Problem Relation Age of Onset    Thyroid Cancer Daughter 52       Social History     Tobacco Use    Smoking status: Never    Smokeless tobacco: Never   Vaping Use    Vaping Use: Never used   Substance Use Topics    Alcohol use: No    Drug use: No            Review of Systems  Review of Systems    Objective:   Physical Exam  Vitals:    08/10/22 1533 08/10/22 1619   BP: (!) 155/86 122/78   Site: Right Upper Arm    Position: Sitting    Cuff Size: Medium Adult    Pulse: 77    SpO2: 95%    Weight: 177 lb (80.3 kg)    Height: 5' 3.39\" (1.61 m)        Physical Exam  NAD    Skin is warm and dry. No rash. Well hydrated  Alert and oriented x 3.   Mood and affect are mildly anxious   The neck is supple and free of adenopathy or masses, the thyroid is normal without enlargement or nodules. Chest: clear with no wheezes or rales. No retractions, or use of accessory muscles noted. Cardiovascular: PMI is not displaced, and no thrill noted. Regular rate and rhythm with no rub, murmur or gallop. No peripheral edema. The abdomen is soft without tenderness, guarding, mass, rebound or organomegaly. Aorta, femoral, DP and PT pulses intact. No lower back tenderness or spasm. Moderate loss of ROM left hip and mild right. Motor 4+/5 hip flexors, flexion and extension knees, dorsiflexion and plantar flexion feet. DTR 2/4 patella and Achilles tendons. Gait is antalgic bilateral.   Assessment:       Diagnosis Orders   1. Dysuria  POCT Urinalysis no Micro      2. Moderate episode of recurrent major depressive disorder (HCC)  escitalopram (LEXAPRO) 10 MG tablet  She says she is taking this every night should have needed a refill 6 mos ago; addressed compliance. Off Ativan x one year! 3. Acquired hypothyroidism  TSH with Reflex  Continue levothyroxine       4. Acute cystitis without hematuria  Culture, Urine    cefdinir (OMNICEF) 300 MG capsule  Importance of pushing fluids addressed      5. OAB (overactive bladder)  mirabegron (MYRBETRIQ) 25 MG TB24      6. Anxiety state  escitalopram (LEXAPRO) 10 MG tablet      7. Pure hypercholesterolemia  Lipid Panel    fenofibrate micronized (LOFIBRA) 134 MG capsule    She says she is taking this every night should have needed a refill 6 mos ago; addressed compliance. 8. Sciatica of right side  External Referral To Physical Therapy      9. Renal insufficiency, mild  Normal renal fxn at recent ER visit. 10. COVID booster and Shingrix encouraged. Plan:      Side effects of current medications reviewed and questions answered. Call or return to clinic prn if these symptoms worsen or fail to improve as anticipated. Schedule AWV next month.

## 2022-08-10 ENCOUNTER — OFFICE VISIT (OUTPATIENT)
Dept: FAMILY MEDICINE CLINIC | Age: 82
End: 2022-08-10
Payer: MEDICARE

## 2022-08-10 VITALS
DIASTOLIC BLOOD PRESSURE: 78 MMHG | HEART RATE: 77 BPM | OXYGEN SATURATION: 95 % | HEIGHT: 63 IN | BODY MASS INDEX: 31.36 KG/M2 | WEIGHT: 177 LBS | SYSTOLIC BLOOD PRESSURE: 122 MMHG

## 2022-08-10 DIAGNOSIS — E03.9 ACQUIRED HYPOTHYROIDISM: ICD-10-CM

## 2022-08-10 DIAGNOSIS — M54.31 SCIATICA OF RIGHT SIDE: ICD-10-CM

## 2022-08-10 DIAGNOSIS — F33.1 MODERATE EPISODE OF RECURRENT MAJOR DEPRESSIVE DISORDER (HCC): ICD-10-CM

## 2022-08-10 DIAGNOSIS — N30.00 ACUTE CYSTITIS WITHOUT HEMATURIA: ICD-10-CM

## 2022-08-10 DIAGNOSIS — N32.81 OAB (OVERACTIVE BLADDER): ICD-10-CM

## 2022-08-10 DIAGNOSIS — F41.1 ANXIETY STATE: Chronic | ICD-10-CM

## 2022-08-10 DIAGNOSIS — N28.9 RENAL INSUFFICIENCY, MILD: ICD-10-CM

## 2022-08-10 DIAGNOSIS — E78.00 PURE HYPERCHOLESTEROLEMIA: ICD-10-CM

## 2022-08-10 DIAGNOSIS — R30.0 DYSURIA: Primary | ICD-10-CM

## 2022-08-10 PROBLEM — F13.20 SEDATIVE, HYPNOTIC OR ANXIOLYTIC DEPENDENCE, UNCOMPLICATED (HCC): Status: RESOLVED | Noted: 2021-07-06 | Resolved: 2022-08-10

## 2022-08-10 LAB
BILIRUBIN, POC: NEGATIVE
BLOOD URINE, POC: ABNORMAL
CLARITY, POC: ABNORMAL
COLOR, POC: ABNORMAL
GLUCOSE URINE, POC: NEGATIVE
KETONES, POC: NEGATIVE
LEUKOCYTE EST, POC: ABNORMAL
NITRITE, POC: NEGATIVE
PH, POC: 1.02
PROTEIN, POC: 100
SPECIFIC GRAVITY, POC: 1.02
UROBILINOGEN, POC: 0.2

## 2022-08-10 PROCEDURE — 99214 OFFICE O/P EST MOD 30 MIN: CPT | Performed by: FAMILY MEDICINE

## 2022-08-10 PROCEDURE — 1123F ACP DISCUSS/DSCN MKR DOCD: CPT | Performed by: FAMILY MEDICINE

## 2022-08-10 PROCEDURE — 81002 URINALYSIS NONAUTO W/O SCOPE: CPT | Performed by: FAMILY MEDICINE

## 2022-08-10 RX ORDER — ESCITALOPRAM OXALATE 10 MG/1
TABLET ORAL
Qty: 90 TABLET | Refills: 1 | Status: SHIPPED | OUTPATIENT
Start: 2022-08-10

## 2022-08-10 RX ORDER — FENOFIBRATE 134 MG/1
CAPSULE ORAL
Qty: 90 CAPSULE | Refills: 1 | Status: SHIPPED | OUTPATIENT
Start: 2022-08-10

## 2022-08-10 RX ORDER — CEFDINIR 300 MG/1
300 CAPSULE ORAL 2 TIMES DAILY
Qty: 10 CAPSULE | Refills: 0 | Status: SHIPPED | OUTPATIENT
Start: 2022-08-10 | End: 2022-08-15

## 2022-08-10 ASSESSMENT — PATIENT HEALTH QUESTIONNAIRE - PHQ9
10. IF YOU CHECKED OFF ANY PROBLEMS, HOW DIFFICULT HAVE THESE PROBLEMS MADE IT FOR YOU TO DO YOUR WORK, TAKE CARE OF THINGS AT HOME, OR GET ALONG WITH OTHER PEOPLE: 0
7. TROUBLE CONCENTRATING ON THINGS, SUCH AS READING THE NEWSPAPER OR WATCHING TELEVISION: 0
4. FEELING TIRED OR HAVING LITTLE ENERGY: 0
3. TROUBLE FALLING OR STAYING ASLEEP: 1
8. MOVING OR SPEAKING SO SLOWLY THAT OTHER PEOPLE COULD HAVE NOTICED. OR THE OPPOSITE, BEING SO FIGETY OR RESTLESS THAT YOU HAVE BEEN MOVING AROUND A LOT MORE THAN USUAL: 0
SUM OF ALL RESPONSES TO PHQ QUESTIONS 1-9: 2
6. FEELING BAD ABOUT YOURSELF - OR THAT YOU ARE A FAILURE OR HAVE LET YOURSELF OR YOUR FAMILY DOWN: 0
SUM OF ALL RESPONSES TO PHQ9 QUESTIONS 1 & 2: 1
SUM OF ALL RESPONSES TO PHQ QUESTIONS 1-9: 2
9. THOUGHTS THAT YOU WOULD BE BETTER OFF DEAD, OR OF HURTING YOURSELF: 0
1. LITTLE INTEREST OR PLEASURE IN DOING THINGS: 0
2. FEELING DOWN, DEPRESSED OR HOPELESS: 1
SUM OF ALL RESPONSES TO PHQ QUESTIONS 1-9: 2
5. POOR APPETITE OR OVEREATING: 0
SUM OF ALL RESPONSES TO PHQ QUESTIONS 1-9: 2

## 2022-08-12 LAB
ORGANISM: ABNORMAL
URINE CULTURE, ROUTINE: ABNORMAL

## 2022-08-24 DIAGNOSIS — E78.00 PURE HYPERCHOLESTEROLEMIA: ICD-10-CM

## 2022-08-24 DIAGNOSIS — E03.9 ACQUIRED HYPOTHYROIDISM: ICD-10-CM

## 2022-08-24 LAB
CHOLESTEROL, TOTAL: 182 MG/DL (ref 0–199)
HDLC SERPL-MCNC: 61 MG/DL (ref 40–60)
LDL CHOLESTEROL CALCULATED: 103 MG/DL
TRIGL SERPL-MCNC: 89 MG/DL (ref 0–150)
TSH REFLEX: 0.36 UIU/ML (ref 0.27–4.2)
VLDLC SERPL CALC-MCNC: 18 MG/DL

## 2022-09-22 ENCOUNTER — TELEMEDICINE (OUTPATIENT)
Dept: FAMILY MEDICINE CLINIC | Age: 82
End: 2022-09-22
Payer: MEDICARE

## 2022-09-22 ENCOUNTER — TELEPHONE (OUTPATIENT)
Dept: FAMILY MEDICINE CLINIC | Age: 82
End: 2022-09-22

## 2022-09-22 DIAGNOSIS — J30.1 SEASONAL ALLERGIC RHINITIS DUE TO POLLEN: ICD-10-CM

## 2022-09-22 DIAGNOSIS — N30.00 ACUTE CYSTITIS WITHOUT HEMATURIA: Primary | ICD-10-CM

## 2022-09-22 PROCEDURE — 99441 PR PHYS/QHP TELEPHONE EVALUATION 5-10 MIN: CPT | Performed by: FAMILY MEDICINE

## 2022-09-22 RX ORDER — CEPHALEXIN 500 MG/1
500 CAPSULE ORAL 3 TIMES DAILY
Qty: 21 CAPSULE | Refills: 0 | Status: SHIPPED | OUTPATIENT
Start: 2022-09-22 | End: 2022-09-29

## 2022-09-22 ASSESSMENT — PATIENT HEALTH QUESTIONNAIRE - PHQ9
SUM OF ALL RESPONSES TO PHQ QUESTIONS 1-9: 0
9. THOUGHTS THAT YOU WOULD BE BETTER OFF DEAD, OR OF HURTING YOURSELF: 0
1. LITTLE INTEREST OR PLEASURE IN DOING THINGS: 0
3. TROUBLE FALLING OR STAYING ASLEEP: 0
SUM OF ALL RESPONSES TO PHQ9 QUESTIONS 1 & 2: 0
2. FEELING DOWN, DEPRESSED OR HOPELESS: 0
4. FEELING TIRED OR HAVING LITTLE ENERGY: 0
6. FEELING BAD ABOUT YOURSELF - OR THAT YOU ARE A FAILURE OR HAVE LET YOURSELF OR YOUR FAMILY DOWN: 0
5. POOR APPETITE OR OVEREATING: 0
SUM OF ALL RESPONSES TO PHQ QUESTIONS 1-9: 0
SUM OF ALL RESPONSES TO PHQ QUESTIONS 1-9: 0
10. IF YOU CHECKED OFF ANY PROBLEMS, HOW DIFFICULT HAVE THESE PROBLEMS MADE IT FOR YOU TO DO YOUR WORK, TAKE CARE OF THINGS AT HOME, OR GET ALONG WITH OTHER PEOPLE: 0
SUM OF ALL RESPONSES TO PHQ QUESTIONS 1-9: 0
8. MOVING OR SPEAKING SO SLOWLY THAT OTHER PEOPLE COULD HAVE NOTICED. OR THE OPPOSITE, BEING SO FIGETY OR RESTLESS THAT YOU HAVE BEEN MOVING AROUND A LOT MORE THAN USUAL: 0
7. TROUBLE CONCENTRATING ON THINGS, SUCH AS READING THE NEWSPAPER OR WATCHING TELEVISION: 0

## 2022-09-22 NOTE — TELEPHONE ENCOUNTER
[address] [facility] [address] [facility] [place] [address] [facility] Practice Staff Subject: Message to Provider     QUESTIONS   Information for Provider? The patient is stating that she thinks has a   UTI? The patient states that she knows this is what she has because she   gets them all of the time. The patient feels too sick to come to the   office for an appt and is also unable to bring in a UA. She is asking for   medicine for the UTI please call her back asap per patient request.   ---------------------------------------------------------------------------   --------------   [address]   [phone]; OK to leave message on voicemail   ---------------------------------------------------------------------------   --------------   SCRIPT ANSWERS   Relationship to Patient?  Self

## 2022-09-22 NOTE — TELEPHONE ENCOUNTER
----- Message from Adrian Iverson sent at 9/22/2022 10:43 AM EDT -----  Subject: Message to Provider    QUESTIONS  Information for Provider? Patient asking to have something called in for   her for a UTI. Patient has been having some cough/cold symptoms and is   concerned she might have COVID. Patient is sure she has a UTI.   ---------------------------------------------------------------------------  --------------  Yvette Martin INFO  [phone]; OK to leave message on voicemail  ---------------------------------------------------------------------------  --------------  SCRIPT ANSWERS  Relationship to Patient?  Self

## 2022-09-22 NOTE — TELEPHONE ENCOUNTER
Dose she have fever, flank pain, nausea, vomiting or blood in her urine? If yes, needs appt. VV is fine. If not I will rx antibx.

## 2022-09-22 NOTE — TELEPHONE ENCOUNTER
Pt stated she has the following going on, Nausea, diarrhea, dizzy, pain while urinating, no fever, not vomited, urine looks darker yellow.  Vv appt was made today    Thank you

## 2022-10-25 ENCOUNTER — TELEPHONE (OUTPATIENT)
Dept: FAMILY MEDICINE CLINIC | Age: 82
End: 2022-10-25

## 2022-10-25 NOTE — TELEPHONE ENCOUNTER
----- Message from Jessica Durand sent at 10/25/2022 12:01 PM EDT -----  Subject: Message to Provider    QUESTIONS  Information for Provider? pt. has sinus infection pt. is requesting a   medication / antibiotic for infection pt. is requesting asap please, call   pt. to discuss thank you   ---------------------------------------------------------------------------  --------------  [address]  [phone]; OK to leave message on voicemail  ---------------------------------------------------------------------------  --------------  SCRIPT ANSWERS  Relationship to Patient?  Self

## 2022-10-26 ENCOUNTER — TELEMEDICINE (OUTPATIENT)
Dept: FAMILY MEDICINE CLINIC | Age: 82
End: 2022-10-26
Payer: MEDICARE

## 2022-10-26 DIAGNOSIS — J06.9 VIRAL URI: ICD-10-CM

## 2022-10-26 DIAGNOSIS — N30.00 ACUTE CYSTITIS WITHOUT HEMATURIA: Primary | ICD-10-CM

## 2022-10-26 PROCEDURE — 99441 PR PHYS/QHP TELEPHONE EVALUATION 5-10 MIN: CPT | Performed by: FAMILY MEDICINE

## 2022-10-26 NOTE — PROGRESS NOTES
Татьяна Cooper is a [de-identified] y.o. female evaluated via telephone on 10/26/2022 for Sinusitis (X1 weeks ongoing, took OTC medications has not help. ), Cough, and Nasal Congestion  . Documentation:  I communicated with the patient and/or health care decision maker about MELVINI    Aylin Price notes URI sxs x 7- 8 days. Pain in the cheeks, nose is sore, HA. Nasal discharge is clear to thick. Occ cough, occ mildly productive clear mucous. No fever, chest pain, dyspnea. No nausea, vomiting, diarrhea   She also notes urinary frequency, urgency. No hematuria, back pain, constipation, diarrhea, pelvic pain.    Rx; Flonase   Last UTI 8/10/22, 2/2021    Patient Active Problem List   Diagnosis    Esophageal reflux    Anxiety state    Pure hypercholesterolemia    Hypothyroidism    IBS (irritable bowel syndrome)    TMJ arthralgia    Lumbar spinal stenosis    Perennial allergic rhinitis    Eczema    Renal insufficiency, mild    Kidney stone    Benign head tremor    Severe episode of recurrent major depressive disorder, without psychotic features ([address]    Gait apraxia     Past Medical History:   Diagnosis Date    Anal pruritus 7/21/2015    Closed fracture of multiple ribs of right side with routine healing 3/83/4352    Diastolic dysfunction 9/91/7998    Falls 07/13/2018    Gastrocnemius muscle rupture 7/18/2016    Hemopneumothorax on right 8/23/2018    Lung nodules 8/7/2017     CT chest 2/2018 stable; no follow up needed    Patellofemoral arthritis 7/21/2015    Postmenopausal atrophic vaginitis 11/14/2011    Pruritic disorder 9/27/2013    Seborrheic keratosis 5/15/2013    Sedative, hypnotic or anxiolytic dependence, uncomplicated 6/9/1062    Shingles 9/18/2014     Allergies   Allergen Reactions    Biaxin [Clarithromycin]     Erythromycin     Lescol     Lipitor     Macrodantin [Nitrofurantoin]     Paxil [Paroxetine]     Zoloft [Sertraline Hcl] Nausea Only and Other (See Comments)     C/o nausea and headaches     Bactrim [Sulfamethoxazole-Trimethoprim] Rash    Biaxin [Clarithromycin] Nausea Only    Ciprofloxacin Rash     Pain under arms. Erythromycin Nausea Only      Current Outpatient Medications on File Prior to Visit   Medication Sig Dispense Refill    mirabegron (MYRBETRIQ) 25 MG TB24 Take 1 tablet by mouth in the morning. 30 tablet 5    fenofibrate micronized (LOFIBRA) 134 MG capsule TAKE 1 CAPSULE BY MOUTH EVERY DAY IN THE MORNING BEFORE BREAKFAST 90 capsule 1    escitalopram (LEXAPRO) 10 MG tablet TAKE 1 TABLET BY MOUTH EVERY DAY 90 tablet 1    levothyroxine (SYNTHROID) 100 MCG tablet TAKE 1 TABLET BY MOUTH EVERY DAY 90 tablet 0    triamcinolone acetonide (KENALOG) 0.1 % paste APPLY TO ORAL LESION 3 TIMES A DAY AS NEEDED 5 g 1    hydrocortisone 2.5 % cream Apply topically 2 times daily Apply topically 2 times daily. 15 g 2    ondansetron (ZOFRAN-ODT) 4 MG disintegrating tablet TAKE 1 TABLET BY MOUTH EVERY 8 HOURS AS NEEDED FOR NAUSEA AND VOMITING 30 tablet 5    Fexofenadine HCl (ALLEGRA PO) Take by mouth      omeprazole (PRILOSEC) 20 MG delayed release capsule TAKE 1 CAPSULE BY MOUTH DAILY 90 capsule 3    acetaminophen (TYLENOL) 325 MG tablet Take 975 mg by mouth every 8 hours as needed for Pain       No current facility-administered medications on file prior to visit. .   Details of this discussion including any medical advice provided:    Diagnosis Orders   1. Acute cystitis without hematuria  cephALEXin (KEFLEX) 500 MG capsule  Push fluids. Follow up if worsening sxs, hematuria, N/V, fever or if not better in 2 to 3 days      2. Viral URI  Advised fluids, otc analgesia, and rest.  Follow up if symptoms are not resolving within 10 days or onset or if symptoms worsen. Side effects of current medications reviewed and questions answered. Call or return to clinic prn if these symptoms worsen or fail to improve as anticipated. Total Time: minutes: 5-10 minutes  7 minutes.      Jony Mariee was evaluated through a synchronous (real-time) audio encounter. Patient identification was verified at the start of the visit. She (or guardian if applicable) is aware that this is a billable service, which includes applicable co-pays. This visit was conducted with the patient's (and/or legal guardian's) verbal consent. She has not had a related appointment within my department in the past 7 days or scheduled within the next 24 hours. The patient was located at Home: [address] [place] [address]. The provider was located at [place] [id number] (Appt Dept): [address] [place] [address],  [address] [place] [address].     Note: not billable if this call serves to triage the patient into an appointment for the relevant concern    Markus Rollins MD

## 2022-10-27 RX ORDER — CEPHALEXIN 500 MG/1
500 CAPSULE ORAL 3 TIMES DAILY
Qty: 21 CAPSULE | Refills: 0 | Status: SHIPPED | OUTPATIENT
Start: 2022-10-27 | End: 2022-11-03

## 2022-11-01 DIAGNOSIS — E03.9 ACQUIRED HYPOTHYROIDISM: ICD-10-CM

## 2022-11-01 RX ORDER — LEVOTHYROXINE SODIUM 0.1 MG/1
TABLET ORAL
Qty: 90 TABLET | Refills: 2 | Status: SHIPPED | OUTPATIENT
Start: 2022-11-01

## 2022-11-01 NOTE — TELEPHONE ENCOUNTER
Requested Prescriptions     Pending Prescriptions Disp Refills    levothyroxine (SYNTHROID) 100 MCG tablet [Pharmacy Med Name: LEVOTHYROXINE 100 MCG TABLET] 90 tablet 0     Sig: TAKE 1 TABLET BY MOUTH EVERY DAY          Last OV; 10/26/2022  Last labs; 8/24/2022  No F/u

## 2022-11-03 ENCOUNTER — NURSE ONLY (OUTPATIENT)
Dept: FAMILY MEDICINE CLINIC | Age: 82
End: 2022-11-03
Payer: MEDICARE

## 2022-11-03 DIAGNOSIS — Z23 NEED FOR IMMUNIZATION AGAINST INFLUENZA: Primary | ICD-10-CM

## 2022-11-03 PROCEDURE — G0008 ADMIN INFLUENZA VIRUS VAC: HCPCS | Performed by: FAMILY MEDICINE

## 2022-11-03 PROCEDURE — 90694 VACC AIIV4 NO PRSRV 0.5ML IM: CPT | Performed by: FAMILY MEDICINE

## 2022-12-15 ENCOUNTER — TELEPHONE (OUTPATIENT)
Dept: FAMILY MEDICINE CLINIC | Age: 82
End: 2022-12-15

## 2022-12-15 DIAGNOSIS — N30.00 ACUTE CYSTITIS WITHOUT HEMATURIA: Primary | ICD-10-CM

## 2022-12-15 RX ORDER — CEPHALEXIN 500 MG/1
500 CAPSULE ORAL 3 TIMES DAILY
Qty: 21 CAPSULE | Refills: 0 | Status: SHIPPED | OUTPATIENT
Start: 2022-12-15 | End: 2022-12-22

## 2022-12-15 NOTE — TELEPHONE ENCOUNTER
Pt stated that her throat has been sore for a couple of days now. The roof of her mouth is sore, no fever, has not tested for covid. Pt also stated that she has a UTI. She has frequent urination and burning while urinating. She would like the following medication to be sent in IC Cephalexin 500MG 1 caps 3 times a day for 7 days.      Pt is willing to come in for a VV if she has to but declined an appt when offered by ghulam    Thank you

## 2023-01-02 PROBLEM — K44.9 LARGE HIATAL HERNIA: Status: ACTIVE | Noted: 2023-01-02

## 2023-01-04 ENCOUNTER — TELEPHONE (OUTPATIENT)
Dept: FAMILY MEDICINE CLINIC | Age: 83
End: 2023-01-04

## 2023-01-04 NOTE — TELEPHONE ENCOUNTER
Update to previous message sent, Pt stated the   name of the hiatal hernia medication is IC Pantoprazo, requesting call   back in regards to this medication

## 2023-01-04 NOTE — TELEPHONE ENCOUNTER
----- Message from Russ Quinteros sent at 1/3/2023  2:52 PM EST -----  Subject: Message to Provider    QUESTIONS  Information for Provider? Pt requesting a call back in regards to her   hiatal hernia medication that was prescribed by a provider in the ED.   ---------------------------------------------------------------------------  --------------  [address]  [phone]; OK to leave message on voicemail  ---------------------------------------------------------------------------  --------------  SCRIPT ANSWERS  Relationship to Patient?  Self

## 2023-01-10 ENCOUNTER — TELEPHONE (OUTPATIENT)
Dept: FAMILY MEDICINE CLINIC | Age: 83
End: 2023-01-10

## 2023-01-10 RX ORDER — SUCRALFATE ORAL 1 G/10ML
SUSPENSION ORAL
COMMUNITY
Start: 2023-01-02 | End: 2023-01-10 | Stop reason: SDUPTHER

## 2023-01-10 RX ORDER — SUCRALFATE ORAL 1 G/10ML
SUSPENSION ORAL
Qty: 1200 ML | Refills: 1 | Status: SHIPPED | OUTPATIENT
Start: 2023-01-10

## 2023-01-10 NOTE — TELEPHONE ENCOUNTER
Patient was in the hospital and was given  IC Sucralfate 1gm/10ml and is need of a refill.     [facility] [address] [facility] [address], [address] [facility] -  [phone]

## 2023-01-26 ENCOUNTER — TELEPHONE (OUTPATIENT)
Dept: FAMILY MEDICINE CLINIC | Age: 83
End: 2023-01-26

## 2023-01-26 NOTE — TELEPHONE ENCOUNTER
Patient has a hemorrhoid started 3-4 days ago. She bleeds bright red small amounts. She said it is very painful. Can something be called in? What can she do for this? Be referred? Please call patient to advise.     Thank you

## 2023-01-26 NOTE — TELEPHONE ENCOUNTER
----- Message from Jeannie Pavon sent at 1/26/2023  1:35 PM EST -----  Subject: Message to Provider    QUESTIONS  Information for Provider? would like to speak with Dr. Zunilda Abebe nurse  ---------------------------------------------------------------------------  --------------  [address] [employer]  [phone]; OK to leave message on voicemail  ---------------------------------------------------------------------------  --------------  SCRIPT ANSWERS  Relationship to Patient?  Self

## 2023-01-26 NOTE — TELEPHONE ENCOUNTER
Pt does not want to come in because she stated that this is very personal and does not feel comfortable coming in for an examination. Can something be called in?  Pt stated that she has no other sx    Thank you

## 2023-01-27 NOTE — TELEPHONE ENCOUNTER
Pt spoke to saira and stated that she is feeling much better and will use the Cortaid cream that karlo recommended for her.  She will call back next week to schedule an appt    Thank you

## 2023-03-06 ENCOUNTER — TELEPHONE (OUTPATIENT)
Dept: GYNECOLOGY | Age: 83
End: 2023-03-06

## 2023-03-06 NOTE — TELEPHONE ENCOUNTER
----- Message from Damaris Cadet sent at 3/6/2023  9:37 AM EST -----  Subject: Message to Provider    QUESTIONS  Information for Provider? Patient is calling because she have a sinus   infection and she was wondering if [facility] can send a antibiotic to   the [facility] [address] [place], [place] [phone], patient would like a call back. ---------------------------------------------------------------------------  --------------  Alexander WALKER  [phone]; OK to leave message on voicemail  ---------------------------------------------------------------------------  --------------  SCRIPT ANSWERS  Relationship to Patient?  Self

## 2023-03-07 NOTE — TELEPHONE ENCOUNTER
Advised patient she would need an appointment. Patient declined. She would rather wait and talk with doctor when she comes in on the 16th.

## 2023-03-29 ENCOUNTER — OFFICE VISIT (OUTPATIENT)
Dept: FAMILY MEDICINE CLINIC | Age: 83
End: 2023-03-29

## 2023-03-29 VITALS
TEMPERATURE: 97.8 F | DIASTOLIC BLOOD PRESSURE: 60 MMHG | RESPIRATION RATE: 14 BRPM | HEIGHT: 64 IN | OXYGEN SATURATION: 100 % | WEIGHT: 174 LBS | BODY MASS INDEX: 29.71 KG/M2 | SYSTOLIC BLOOD PRESSURE: 128 MMHG | HEART RATE: 61 BPM

## 2023-03-29 DIAGNOSIS — E78.00 PURE HYPERCHOLESTEROLEMIA: ICD-10-CM

## 2023-03-29 DIAGNOSIS — G89.29 CHRONIC PAIN OF BOTH KNEES: ICD-10-CM

## 2023-03-29 DIAGNOSIS — J01.00 ACUTE NON-RECURRENT MAXILLARY SINUSITIS: Primary | ICD-10-CM

## 2023-03-29 DIAGNOSIS — M77.8 TENDINITIS OF SHOULDER, UNSPECIFIED LATERALITY: ICD-10-CM

## 2023-03-29 DIAGNOSIS — F33.1 MODERATE EPISODE OF RECURRENT MAJOR DEPRESSIVE DISORDER (HCC): ICD-10-CM

## 2023-03-29 DIAGNOSIS — M25.561 CHRONIC PAIN OF BOTH KNEES: ICD-10-CM

## 2023-03-29 DIAGNOSIS — M25.562 CHRONIC PAIN OF BOTH KNEES: ICD-10-CM

## 2023-03-29 DIAGNOSIS — E03.9 ACQUIRED HYPOTHYROIDISM: ICD-10-CM

## 2023-03-29 RX ORDER — ESCITALOPRAM OXALATE 10 MG/1
TABLET ORAL
Qty: 90 TABLET | Refills: 1 | Status: CANCELLED | OUTPATIENT
Start: 2023-03-29

## 2023-03-29 RX ORDER — FENOFIBRATE 134 MG/1
CAPSULE ORAL
Qty: 90 CAPSULE | Refills: 1 | Status: CANCELLED | OUTPATIENT
Start: 2023-03-29

## 2023-03-29 RX ORDER — LEVOTHYROXINE SODIUM 0.1 MG/1
100 TABLET ORAL DAILY
Qty: 90 TABLET | Refills: 1 | Status: SHIPPED | OUTPATIENT
Start: 2023-03-29

## 2023-03-29 RX ORDER — CEFDINIR 300 MG/1
300 CAPSULE ORAL 2 TIMES DAILY
Qty: 20 CAPSULE | Refills: 0 | Status: SHIPPED | OUTPATIENT
Start: 2023-03-29 | End: 2023-04-08

## 2023-03-29 RX ORDER — FENOFIBRATE 134 MG/1
CAPSULE ORAL
Qty: 90 CAPSULE | Refills: 1 | OUTPATIENT
Start: 2023-03-29

## 2023-03-29 RX ORDER — FENOFIBRATE 134 MG/1
CAPSULE ORAL
Qty: 90 CAPSULE | Refills: 1 | Status: SHIPPED | OUTPATIENT
Start: 2023-03-29

## 2023-03-29 SDOH — ECONOMIC STABILITY: FOOD INSECURITY: WITHIN THE PAST 12 MONTHS, YOU WORRIED THAT YOUR FOOD WOULD RUN OUT BEFORE YOU GOT MONEY TO BUY MORE.: NEVER TRUE

## 2023-03-29 SDOH — ECONOMIC STABILITY: HOUSING INSECURITY
IN THE LAST 12 MONTHS, WAS THERE A TIME WHEN YOU DID NOT HAVE A STEADY PLACE TO SLEEP OR SLEPT IN A SHELTER (INCLUDING NOW)?: NO

## 2023-03-29 SDOH — ECONOMIC STABILITY: INCOME INSECURITY: HOW HARD IS IT FOR YOU TO PAY FOR THE VERY BASICS LIKE FOOD, HOUSING, MEDICAL CARE, AND HEATING?: NOT HARD AT ALL

## 2023-03-29 SDOH — ECONOMIC STABILITY: FOOD INSECURITY: WITHIN THE PAST 12 MONTHS, THE FOOD YOU BOUGHT JUST DIDN'T LAST AND YOU DIDN'T HAVE MONEY TO GET MORE.: NEVER TRUE

## 2023-03-29 ASSESSMENT — PATIENT HEALTH QUESTIONNAIRE - PHQ9
4. FEELING TIRED OR HAVING LITTLE ENERGY: 0
2. FEELING DOWN, DEPRESSED OR HOPELESS: 0
SUM OF ALL RESPONSES TO PHQ9 QUESTIONS 1 & 2: 0
8. MOVING OR SPEAKING SO SLOWLY THAT OTHER PEOPLE COULD HAVE NOTICED. OR THE OPPOSITE, BEING SO FIGETY OR RESTLESS THAT YOU HAVE BEEN MOVING AROUND A LOT MORE THAN USUAL: 0
7. TROUBLE CONCENTRATING ON THINGS, SUCH AS READING THE NEWSPAPER OR WATCHING TELEVISION: 0
6. FEELING BAD ABOUT YOURSELF - OR THAT YOU ARE A FAILURE OR HAVE LET YOURSELF OR YOUR FAMILY DOWN: 0
10. IF YOU CHECKED OFF ANY PROBLEMS, HOW DIFFICULT HAVE THESE PROBLEMS MADE IT FOR YOU TO DO YOUR WORK, TAKE CARE OF THINGS AT HOME, OR GET ALONG WITH OTHER PEOPLE: 0
5. POOR APPETITE OR OVEREATING: 0
9. THOUGHTS THAT YOU WOULD BE BETTER OFF DEAD, OR OF HURTING YOURSELF: 0
3. TROUBLE FALLING OR STAYING ASLEEP: 0
SUM OF ALL RESPONSES TO PHQ QUESTIONS 1-9: 0
1. LITTLE INTEREST OR PLEASURE IN DOING THINGS: 0

## 2023-03-29 NOTE — PROGRESS NOTES
plantar flexion feet. DTR 2/4 patella and Achilles tendons. Assessment:       Diagnosis Orders   1. Acute non-recurrent maxillary sinusitis  cefdinir (OMNICEF) 300 MG capsule      2. Acquired hypothyroidism  levothyroxine (SYNTHROID) 100 MCG tablet  Annual TSH      3. Moderate episode of recurrent major depressive disorder ([facility] [address]  She is doing very well off medications. Continue to monitor. 4. Pure hypercholesterolemia  fenofibrate micronized (LOFIBRA) 134 MG capsule  Continue diet; exercise encouraged. 5. Chronic pain of both knees  External Referral To Physical Therapy      6. Tendinitis of shoulder, unspecified laterality  External Referral To Physical Therapy       7. Encouraged shingles and COVID vaccination. Plan:      Side effects of current medications reviewed and questions answered. Follow up in 6 months or prn.

## 2023-03-29 NOTE — PATIENT INSTRUCTIONS
Λουτράκι MD Sophie  [address] [address] [facility] [address]  Phone: [phone]

## 2023-05-15 RX ORDER — TRIAMCINOLONE ACETONIDE 0.1 %
PASTE (GRAM) DENTAL
Qty: 5 G | Refills: 1 | OUTPATIENT
Start: 2023-05-15

## 2023-05-16 RX ORDER — TRIAMCINOLONE ACETONIDE 0.1 %
PASTE (GRAM) DENTAL
Qty: 5 G | Refills: 1 | OUTPATIENT
Start: 2023-05-16

## 2023-05-23 RX ORDER — TRIAMCINOLONE ACETONIDE 0.1 %
PASTE (GRAM) DENTAL
Qty: 5 G | Refills: 1 | Status: SHIPPED | OUTPATIENT
Start: 2023-05-23

## 2023-05-30 ENCOUNTER — HOSPITAL ENCOUNTER (EMERGENCY)
Age: 83
Discharge: HOME | End: 2023-05-30
Attending: EMERGENCY MEDICINE
Payer: MEDICARE

## 2023-05-30 VITALS
HEIGHT: 66 IN | OXYGEN SATURATION: 98 % | TEMPERATURE: 98.2 F | WEIGHT: 188.3 LBS | SYSTOLIC BLOOD PRESSURE: 195 MMHG | DIASTOLIC BLOOD PRESSURE: 75 MMHG | RESPIRATION RATE: 18 BRPM | HEART RATE: 71 BPM | BODY MASS INDEX: 30.26 KG/M2

## 2023-05-30 DIAGNOSIS — N30.00 ACUTE CYSTITIS WITHOUT HEMATURIA: Primary | ICD-10-CM

## 2023-05-30 LAB
BACTERIA URNS QL MICRO: ABNORMAL /HPF
BILIRUB UR QL STRIP.AUTO: NEGATIVE
CLARITY UR: CLEAR
COLOR UR: YELLOW
EPI CELLS #/AREA URNS HPF: ABNORMAL /HPF (ref 0–5)
GLUCOSE UR STRIP.AUTO-MCNC: NEGATIVE MG/DL
HGB UR QL STRIP.AUTO: ABNORMAL
KETONES UR STRIP.AUTO-MCNC: NEGATIVE MG/DL
LEUKOCYTE ESTERASE UR QL STRIP.AUTO: ABNORMAL
NITRITE UR QL STRIP.AUTO: NEGATIVE
PH UR STRIP.AUTO: 7 [PH] (ref 5–8)
PROT UR STRIP.AUTO-MCNC: 100 MG/DL
RBC #/AREA URNS HPF: ABNORMAL /HPF (ref 0–4)
RENAL EPI CELLS #/AREA UR COMP ASSIST: ABNORMAL /HPF (ref 0–1)
SP GR UR STRIP.AUTO: 1.02 (ref 1–1.03)
UA COMPLETE W REFLEX CULTURE PNL UR: ABNORMAL
UA DIPSTICK W REFLEX MICRO PNL UR: YES
URN SPEC COLLECT METH UR: ABNORMAL
UROBILINOGEN UR STRIP-ACNC: 0.2 E.U./DL
WBC #/AREA URNS HPF: ABNORMAL /HPF (ref 0–5)

## 2023-05-30 PROCEDURE — 81001 URINALYSIS AUTO W/SCOPE: CPT

## 2023-05-30 PROCEDURE — 99283 EMERGENCY DEPT VISIT LOW MDM: CPT

## 2023-05-30 PROCEDURE — 6370000000 HC RX 637 (ALT 250 FOR IP): Performed by: EMERGENCY MEDICINE

## 2023-05-30 RX ORDER — CEPHALEXIN 500 MG/1
500 CAPSULE ORAL ONCE
Status: COMPLETED | OUTPATIENT
Start: 2023-05-30 | End: 2023-05-30

## 2023-05-30 RX ORDER — CEPHALEXIN 500 MG/1
500 CAPSULE ORAL 4 TIMES DAILY
Qty: 12 CAPSULE | Refills: 0 | Status: SHIPPED | OUTPATIENT
Start: 2023-05-30 | End: 2023-06-02

## 2023-05-30 RX ADMIN — CEPHALEXIN 500 MG: 500 CAPSULE ORAL at 23:21

## 2023-05-30 ASSESSMENT — PAIN DESCRIPTION - LOCATION: LOCATION: VAGINA

## 2023-05-30 ASSESSMENT — PAIN - FUNCTIONAL ASSESSMENT: PAIN_FUNCTIONAL_ASSESSMENT: 0-10

## 2023-05-30 ASSESSMENT — PAIN DESCRIPTION - DESCRIPTORS: DESCRIPTORS: ACHING

## 2023-05-30 ASSESSMENT — PAIN SCALES - GENERAL: PAINLEVEL_OUTOF10: 5

## 2023-05-31 NOTE — ED TRIAGE NOTES
Pt comes to the ED reporting that she started to have an aching pain in her vagina today. Pt states she believes she has a UTI.

## 2023-05-31 NOTE — DISCHARGE INSTRUCTIONS
Take the full course of antibiotics as prescribed.   Please return to the ER for new or worsening nausea or vomiting, fevers or chills, increasing pain or inability to tolerate your antibiotics

## 2023-05-31 NOTE — ED PROVIDER NOTES
1 [facility]  EMERGENCY DEPARTMENT ENCOUNTER          ATTENDING PHYSICIAN NOTE       Date of evaluation: 5/30/2023    Chief Complaint     Urinary Tract Infection      History of Present Illness     Juaquin Calzada is a 82 y.o. female who presents with a complaint of dysuria. The patient states that she has a history of E. coli urinary tract infections and felt dysuria and vaginal pain while urinating earlier today. She denies nausea vomiting radiation of the pain to her flanks. She denies any vaginal discharge. She states this feels similar to prior UTIs. She denies fevers or chills, or fatigue    ASSESSMENT / PLAN  (MEDICAL DECISION MAKING)     INITIAL VITALS: BP: (!) 195/75, Temp: 98.2 °F (36.8 °C), Pulse: 71, Respirations: 18, SpO2: 98 %      Juaquin Calzada is a 82 y.o. female presenting with dysuria, and signs of early UTI based on positive leuk esterase, with prior Proteus UTIs noted. Will treat with Keflex here and for 3 days given this appears to be an uncomplicated UTI. No signs of pyelonephritis, or renal colic. She is well-appearing at time of discharge. .    Is this patient to be included in the SEP-1 core measure due to severe sepsis or septic shock? No Exclusion criteria - the patient is NOT to be included for SEP-1 Core Measure due to: 2+ SIRS criteria are not met    Medical Decision Making  Risk  Prescription drug management. Clinical Impression     1.  Acute cystitis without hematuria        Disposition     PATIENT REFERRED TO:  Kiana Izaguirre MD  [address] [place] [address] [phone]    Schedule an appointment as soon as possible for a visit       [facility]  [address] [place] [address]  [phone]    If symptoms worsen      DISCHARGE MEDICATIONS:  New Prescriptions    CEPHALEXIN (KEFLEX) 500 MG CAPSULE    Take 1 capsule by mouth 4 times daily for 3 days       DISPOSITION Discharge - Pending Orders Complete

## 2023-06-15 PROBLEM — F33.2 SEVERE EPISODE OF RECURRENT MAJOR DEPRESSIVE DISORDER, WITHOUT PSYCHOTIC FEATURES (HCC): Status: RESOLVED | Noted: 2021-05-02 | Resolved: 2023-06-15

## 2023-06-15 PROBLEM — R48.2 GAIT APRAXIA: Status: RESOLVED | Noted: 2021-09-21 | Resolved: 2023-06-15

## 2023-07-11 ENCOUNTER — TELEPHONE (OUTPATIENT)
Dept: FAMILY MEDICINE CLINIC | Age: 83
End: 2023-07-11

## 2023-07-11 NOTE — TELEPHONE ENCOUNTER
----- Message from [id number] [address] sent at 7/11/2023  1:41 PM EDT -----  Subject: Referral Request    Reason for referral request? Valarie Mehta would like a referral to a   Dermatologist. She has a mole that needs to be removed. Provider patient wants to be referred to(if known):     Provider Phone Number(if known):     Additional Information for Provider?   ---------------------------------------------------------------------------  --------------  [place] [facility]    [phone]; OK to leave message on voicemail  ---------------------------------------------------------------------------  --------------

## 2023-07-14 NOTE — TELEPHONE ENCOUNTER
Dermatologist at [facility] in this location are not accepting new patients. Please advise patient to contact her insurance for in network providers and we can send the referral information to whoever she prefers.

## 2023-07-14 NOTE — TELEPHONE ENCOUNTER
Advised patient she needs to check with insurance to see who is in network as we do not have any local [facility] providers to refer to zane. Also recommended [facility]-- patient will check with their office to see if they accept her insurance and will callback for referral placement if they do.

## 2023-07-19 DIAGNOSIS — E78.2 MIXED HYPERLIPIDEMIA: ICD-10-CM

## 2023-07-19 DIAGNOSIS — E03.9 ACQUIRED HYPOTHYROIDISM: ICD-10-CM

## 2023-07-19 LAB
ALBUMIN SERPL-MCNC: 4 G/DL (ref 3.4–5)
ALBUMIN/GLOB SERPL: 1.9 {RATIO} (ref 1.1–2.2)
ALP SERPL-CCNC: 94 U/L (ref 40–129)
ALT SERPL-CCNC: 17 U/L (ref 10–40)
ANION GAP SERPL CALCULATED.3IONS-SCNC: 11 MMOL/L (ref 3–16)
AST SERPL-CCNC: 25 U/L (ref 15–37)
BASOPHILS # BLD: 0 K/UL (ref 0–0.2)
BASOPHILS NFR BLD: 0.9 %
BILIRUB SERPL-MCNC: 0.3 MG/DL (ref 0–1)
BUN SERPL-MCNC: 21 MG/DL (ref 7–20)
CALCIUM SERPL-MCNC: 9.5 MG/DL (ref 8.3–10.6)
CHLORIDE SERPL-SCNC: 105 MMOL/L (ref 99–110)
CHOLEST SERPL-MCNC: 165 MG/DL (ref 0–199)
CO2 SERPL-SCNC: 24 MMOL/L (ref 21–32)
CREAT SERPL-MCNC: 0.9 MG/DL (ref 0.6–1.2)
DEPRECATED RDW RBC AUTO: 16 % (ref 12.4–15.4)
EOSINOPHIL # BLD: 0.3 K/UL (ref 0–0.6)
EOSINOPHIL NFR BLD: 5.1 %
GFR SERPLBLD CREATININE-BSD FMLA CKD-EPI: >60 ML/MIN/{1.73_M2}
GLUCOSE SERPL-MCNC: 91 MG/DL (ref 70–99)
HCT VFR BLD AUTO: 35.5 % (ref 36–48)
HDLC SERPL-MCNC: 61 MG/DL (ref 40–60)
HGB BLD-MCNC: 11.3 G/DL (ref 12–16)
LDLC SERPL CALC-MCNC: 80 MG/DL
LYMPHOCYTES # BLD: 1.4 K/UL (ref 1–5.1)
LYMPHOCYTES NFR BLD: 27.3 %
MCH RBC QN AUTO: 23.4 PG (ref 26–34)
MCHC RBC AUTO-ENTMCNC: 31.8 G/DL (ref 31–36)
MCV RBC AUTO: 73.5 FL (ref 80–100)
MONOCYTES # BLD: 0.7 K/UL (ref 0–1.3)
MONOCYTES NFR BLD: 13.7 %
NEUTROPHILS # BLD: 2.8 K/UL (ref 1.7–7.7)
NEUTROPHILS NFR BLD: 53 %
PLATELET # BLD AUTO: 277 K/UL (ref 135–450)
PMV BLD AUTO: 10.5 FL (ref 5–10.5)
POTASSIUM SERPL-SCNC: 4.5 MMOL/L (ref 3.5–5.1)
PROT SERPL-MCNC: 6.1 G/DL (ref 6.4–8.2)
RBC # BLD AUTO: 4.83 M/UL (ref 4–5.2)
SODIUM SERPL-SCNC: 140 MMOL/L (ref 136–145)
T4 FREE SERPL-MCNC: 2.4 NG/DL (ref 0.9–1.8)
TRIGL SERPL-MCNC: 119 MG/DL (ref 0–150)
TSH SERPL DL<=0.005 MIU/L-ACNC: 0.41 UIU/ML (ref 0.27–4.2)
VLDLC SERPL CALC-MCNC: 24 MG/DL
WBC # BLD AUTO: 5.3 K/UL (ref 4–11)

## 2023-07-26 ENCOUNTER — OFFICE VISIT (OUTPATIENT)
Dept: FAMILY MEDICINE CLINIC | Age: 83
End: 2023-07-26

## 2023-07-26 VITALS
HEIGHT: 66 IN | WEIGHT: 183.2 LBS | HEART RATE: 89 BPM | BODY MASS INDEX: 29.44 KG/M2 | TEMPERATURE: 97.1 F | OXYGEN SATURATION: 96 % | SYSTOLIC BLOOD PRESSURE: 130 MMHG | DIASTOLIC BLOOD PRESSURE: 74 MMHG

## 2023-07-26 DIAGNOSIS — Z00.00 MEDICARE ANNUAL WELLNESS VISIT, SUBSEQUENT: Primary | ICD-10-CM

## 2023-07-26 DIAGNOSIS — Z78.0 ASYMPTOMATIC POSTMENOPAUSAL STATE: ICD-10-CM

## 2023-07-26 DIAGNOSIS — M79.671 RIGHT FOOT PAIN: ICD-10-CM

## 2023-07-26 RX ORDER — LANOLIN ALCOHOL/MO/W.PET/CERES
325 CREAM (GRAM) TOPICAL
Qty: 90 TABLET | Refills: 0 | Status: SHIPPED | OUTPATIENT
Start: 2023-07-26

## 2023-07-26 ASSESSMENT — PATIENT HEALTH QUESTIONNAIRE - PHQ9
3. TROUBLE FALLING OR STAYING ASLEEP: 0
2. FEELING DOWN, DEPRESSED OR HOPELESS: 0
7. TROUBLE CONCENTRATING ON THINGS, SUCH AS READING THE NEWSPAPER OR WATCHING TELEVISION: 0
9. THOUGHTS THAT YOU WOULD BE BETTER OFF DEAD, OR OF HURTING YOURSELF: 0
SUM OF ALL RESPONSES TO PHQ QUESTIONS 1-9: 0
8. MOVING OR SPEAKING SO SLOWLY THAT OTHER PEOPLE COULD HAVE NOTICED. OR THE OPPOSITE, BEING SO FIGETY OR RESTLESS THAT YOU HAVE BEEN MOVING AROUND A LOT MORE THAN USUAL: 0
6. FEELING BAD ABOUT YOURSELF - OR THAT YOU ARE A FAILURE OR HAVE LET YOURSELF OR YOUR FAMILY DOWN: 0
SUM OF ALL RESPONSES TO PHQ QUESTIONS 1-9: 0
1. LITTLE INTEREST OR PLEASURE IN DOING THINGS: 0
10. IF YOU CHECKED OFF ANY PROBLEMS, HOW DIFFICULT HAVE THESE PROBLEMS MADE IT FOR YOU TO DO YOUR WORK, TAKE CARE OF THINGS AT HOME, OR GET ALONG WITH OTHER PEOPLE: 0
4. FEELING TIRED OR HAVING LITTLE ENERGY: 0
5. POOR APPETITE OR OVEREATING: 0
SUM OF ALL RESPONSES TO PHQ9 QUESTIONS 1 & 2: 0
SUM OF ALL RESPONSES TO PHQ QUESTIONS 1-9: 0
SUM OF ALL RESPONSES TO PHQ QUESTIONS 1-9: 0

## 2023-07-26 ASSESSMENT — LIFESTYLE VARIABLES
HOW MANY STANDARD DRINKS CONTAINING ALCOHOL DO YOU HAVE ON A TYPICAL DAY: PATIENT DOES NOT DRINK
HOW OFTEN DO YOU HAVE A DRINK CONTAINING ALCOHOL: NEVER

## 2023-07-26 NOTE — PROGRESS NOTES
Macrodantin [Nitrofurantoin]     Paxil [Paroxetine]     Zoloft [Sertraline Hcl] Nausea Only and Other (See Comments)     C/o nausea and headaches     Bactrim [Sulfamethoxazole-Trimethoprim] Rash    Biaxin [Clarithromycin] Nausea Only    Ciprofloxacin Rash     Pain under arms. Erythromycin Nausea Only     Prior to Visit Medications    Medication Sig Taking? Authorizing Provider   ferrous sulfate (FE TABS) 325 (65 Fe) MG EC tablet Take 1 tablet by mouth daily (with breakfast) Yes Timbo Garnett MD   sucralfate (CARAFATE) 1 GM/10ML suspension TAKE 10 MLS BY MOUTH 4 (FOUR) TIMES DAILY BEFORE MEALS AND NIGHTLY.  Yes Historical Provider, MD   pantoprazole (PROTONIX) 40 MG tablet Take 1 tablet by mouth every morning (before breakfast) Yes Timbo Garnett MD   triamcinolone acetonide (KENALOG) 0.1 % paste APPLY TO ORAL LESION 3 TIMES A DAY AS NEEDED Yes Timbo Garnett MD   fenofibrate micronized (LOFIBRA) 134 MG capsule TAKE 1 CAPSULE BY MOUTH EVERY DAY IN THE MORNING BEFORE BREAKFAST  Patient taking differently: at bedtime TAKE 1 CAPSULE BY MOUTH EVERY DAY IN THE MORNING BEFORE BREAKFAST Yes Jc Ramos MD   levothyroxine (SYNTHROID) 100 MCG tablet Take 1 tablet by mouth daily Yes Jc Ramos MD   Fexofenadine HCl (ALLEGRA PO) Take by mouth Yes Historical Provider, MD   acetaminophen (TYLENOL) 325 MG tablet Take 3 tablets by mouth every 8 hours as needed for Pain Yes Historical Provider, MD Faustin (Including outside providers/suppliers regularly involved in providing care):   Patient Care Team:  Timbo Garnett MD as PCP - General (Family Medicine)  Timbo Garnett MD as PCP - Empaneled Provider     Reviewed and updated this visit:  Tobacco  Allergies  Meds  Problems  Med Hx  Surg Hx  Soc Hx  Fam Hx

## 2023-07-31 DIAGNOSIS — E03.9 ACQUIRED HYPOTHYROIDISM: ICD-10-CM

## 2023-07-31 RX ORDER — LEVOTHYROXINE SODIUM 0.1 MG/1
100 TABLET ORAL DAILY
Qty: 90 TABLET | Refills: 1 | Status: SHIPPED | OUTPATIENT
Start: 2023-07-31

## 2023-10-05 ENCOUNTER — OFFICE VISIT (OUTPATIENT)
Dept: FAMILY MEDICINE CLINIC | Age: 83
End: 2023-10-05

## 2023-10-05 VITALS
TEMPERATURE: 97.1 F | HEIGHT: 66 IN | WEIGHT: 179.4 LBS | DIASTOLIC BLOOD PRESSURE: 78 MMHG | BODY MASS INDEX: 28.83 KG/M2 | OXYGEN SATURATION: 97 % | HEART RATE: 47 BPM | SYSTOLIC BLOOD PRESSURE: 106 MMHG

## 2023-10-05 DIAGNOSIS — J01.90 ACUTE BACTERIAL SINUSITIS: Primary | ICD-10-CM

## 2023-10-05 DIAGNOSIS — B96.89 ACUTE BACTERIAL SINUSITIS: Primary | ICD-10-CM

## 2023-10-05 RX ORDER — AMOXICILLIN AND CLAVULANATE POTASSIUM 875; 125 MG/1; MG/1
1 TABLET, FILM COATED ORAL 2 TIMES DAILY
Qty: 20 TABLET | Refills: 0 | Status: SHIPPED | OUTPATIENT
Start: 2023-10-05 | End: 2023-10-15

## 2023-10-05 NOTE — PROGRESS NOTES
Portions of this chart may have been created with voice recognition software. Occasional wrong-word or \"sound-alike\" substitutions may have occurred due to the inherent limitations of voice recognition software. Read the chart carefully and recognize, using context, where these substitutions have occurred        Chief Complaint     Sinusitis (Patient symptomatic for the past 1.5 week. Having lot of drainage, slight cough and facial pressure. Has been taking Tylenol, allegra and using flonase OTC w/ no relief.)               CLARA Avalos is a 82 y.o. female here for symptoms of a URI, possible sinusitis. Symptoms include bilateral ear pain, congestion, coryza, cough, fever, plugged sensation in both ears, and sore throat. Onset of   symptoms was 2 weeks  ago, gradually worsening since that time. She also c/o facial pain, nasal congestion, post nasal drip, productive cough with  clear ,mucoid colored sputum, purulent nasal discharge, and sinus pressure for the past 4 days . She is drinking plenty of fluids. Evaluation to date: none. Treatment to date:antihistamines, nasal steroids,  OTC cough suppressant, Acetaminophen, NSAID, which has been  somewhat effective.          REVIEW OF SYSTEMS:    Pertinent Symptoms noted in HPI          Current Outpatient Medications   Medication Sig Dispense Refill    amoxicillin-clavulanate (AUGMENTIN) 875-125 MG per tablet Take 1 tablet by mouth 2 times daily for 10 days 20 tablet 0    levothyroxine (SYNTHROID) 100 MCG tablet Take 1 tablet by mouth daily 90 tablet 1    ferrous sulfate (FE TABS) 325 (65 Fe) MG EC tablet Take 1 tablet by mouth daily (with breakfast) 90 tablet 0    pantoprazole (PROTONIX) 40 MG tablet Take 1 tablet by mouth every morning (before breakfast) 90 tablet 1    triamcinolone acetonide (KENALOG) 0.1 % paste APPLY TO ORAL LESION 3 TIMES A DAY AS NEEDED 5 g 1    fenofibrate micronized (LOFIBRA) 134 MG capsule TAKE 1 CAPSULE BY MOUTH EVERY DAY IN THE

## 2023-10-10 ENCOUNTER — TELEPHONE (OUTPATIENT)
Dept: FAMILY MEDICINE CLINIC | Age: 83
End: 2023-10-10

## 2023-10-10 NOTE — TELEPHONE ENCOUNTER
LMOM and advised to d/c medication. Pt advised to call back with symptoms update tomorrow. Message routed to PCP for further recommendations.

## 2023-10-10 NOTE — TELEPHONE ENCOUNTER
----- Message from Srinivasan Longo sent at 10/10/2023  2:29 PM EDT -----  Subject: Medication Problem    Medication: amoxicillin-clavulanate (AUGMENTIN) 875-125 MG per tablet  Dosage: 1 tablet twice daily  Ordering Provider: yoly    Question/Problem: Patient is having an allergic reaction to this   medication. She has itchy skin all over which started after she started   this medication. She uses [facility] in [facility]# [phone].       Pharmacy: [facility] [address], [address] [facility] [place] [facility] [phone]    ---------------------------------------------------------------------------  --------------  Anaid DONALD  [phone]; OK to leave message on voicemail  ---------------------------------------------------------------------------  --------------    SCRIPT ANSWERS  Relationship to Patient: Self

## 2023-10-11 RX ORDER — CEPHALEXIN 500 MG/1
500 CAPSULE ORAL 2 TIMES DAILY
Qty: 14 CAPSULE | Refills: 0 | Status: SHIPPED | OUTPATIENT
Start: 2023-10-11 | End: 2023-10-18

## 2023-10-11 NOTE — TELEPHONE ENCOUNTER
Pt states it is just all over itchy sensation but denies any rashes or skin issues. Advised patient to take alt that was sent to the pharmacy, complete entire course of abx and call us back if itchiness persists.  No further questions or concerns at this time

## 2023-10-30 RX ORDER — LANOLIN ALCOHOL/MO/W.PET/CERES
1 CREAM (GRAM) TOPICAL
Qty: 90 TABLET | Refills: 0 | Status: SHIPPED | OUTPATIENT
Start: 2023-10-30

## 2023-11-15 DIAGNOSIS — E78.00 PURE HYPERCHOLESTEROLEMIA: ICD-10-CM

## 2023-11-15 RX ORDER — FENOFIBRATE 134 MG/1
CAPSULE ORAL
Qty: 90 CAPSULE | Refills: 0 | Status: SHIPPED | OUTPATIENT
Start: 2023-11-15

## 2023-12-12 RX ORDER — PANTOPRAZOLE SODIUM 40 MG/1
40 TABLET, DELAYED RELEASE ORAL
Qty: 90 TABLET | Refills: 1 | Status: SHIPPED | OUTPATIENT
Start: 2023-12-12

## 2024-02-27 DIAGNOSIS — E78.00 PURE HYPERCHOLESTEROLEMIA: ICD-10-CM

## 2024-02-28 RX ORDER — FENOFIBRATE 134 MG/1
134 CAPSULE ORAL
Qty: 90 CAPSULE | Refills: 0 | Status: SHIPPED | OUTPATIENT
Start: 2024-02-28

## 2024-03-09 DIAGNOSIS — E78.00 PURE HYPERCHOLESTEROLEMIA: ICD-10-CM

## 2024-03-11 RX ORDER — FENOFIBRATE 134 MG/1
134 CAPSULE ORAL
Qty: 90 CAPSULE | Refills: 0 | OUTPATIENT
Start: 2024-03-11

## 2024-04-09 DIAGNOSIS — E03.9 ACQUIRED HYPOTHYROIDISM: ICD-10-CM

## 2024-04-10 RX ORDER — LEVOTHYROXINE SODIUM 0.1 MG/1
100 TABLET ORAL DAILY
Qty: 90 TABLET | Refills: 1 | OUTPATIENT
Start: 2024-04-10

## 2024-04-18 DIAGNOSIS — E03.9 ACQUIRED HYPOTHYROIDISM: ICD-10-CM

## 2024-04-18 RX ORDER — LEVOTHYROXINE SODIUM 0.1 MG/1
100 TABLET ORAL DAILY
Qty: 90 TABLET | Refills: 1 | OUTPATIENT
Start: 2024-04-18

## 2024-04-19 NOTE — TELEPHONE ENCOUNTER
Appointment scheduled. Pt stated this appointment scheduled is the soonest she can do as her  is in the hospital.     Pt is requesting medication to cover until appointment. Pt stated she has about 7 tablets left.     Pt inquired if she can take her 's prescription of the same medication, different dose. Advised for the pt not to do that and to just take her medication only.

## 2024-04-22 RX ORDER — LEVOTHYROXINE SODIUM 0.1 MG/1
100 TABLET ORAL DAILY
Qty: 90 TABLET | Refills: 0 | Status: SHIPPED | OUTPATIENT
Start: 2024-04-22

## 2024-05-02 ENCOUNTER — TELEPHONE (OUTPATIENT)
Dept: FAMILY MEDICINE CLINIC | Age: 84
End: 2024-05-02

## 2024-05-02 NOTE — TELEPHONE ENCOUNTER
Called Julita in regards to admissions paperwork that was faxed over to the office. Alejandra was unavailable. LVM that we have received forms for pt but will be unable to fill until we see her for an appt first. Advised once pt has been seen and discussed with PCP, completed forms will be faxed over to them.    Advised to callback with any questions or concerns.

## 2024-05-06 ENCOUNTER — OFFICE VISIT (OUTPATIENT)
Dept: FAMILY MEDICINE CLINIC | Age: 84
End: 2024-05-06
Payer: MEDICARE

## 2024-05-06 VITALS
DIASTOLIC BLOOD PRESSURE: 84 MMHG | HEART RATE: 81 BPM | TEMPERATURE: 96.9 F | SYSTOLIC BLOOD PRESSURE: 122 MMHG | BODY MASS INDEX: 28.73 KG/M2 | WEIGHT: 178 LBS | OXYGEN SATURATION: 96 %

## 2024-05-06 DIAGNOSIS — Z78.9 RESIDES IN LONG TERM CARE FACILITY: ICD-10-CM

## 2024-05-06 DIAGNOSIS — K58.0 IRRITABLE BOWEL SYNDROME WITH DIARRHEA: ICD-10-CM

## 2024-05-06 DIAGNOSIS — J30.89 PERENNIAL ALLERGIC RHINITIS: ICD-10-CM

## 2024-05-06 DIAGNOSIS — L30.9 ECZEMA, UNSPECIFIED TYPE: ICD-10-CM

## 2024-05-06 DIAGNOSIS — E03.9 ACQUIRED HYPOTHYROIDISM: ICD-10-CM

## 2024-05-06 DIAGNOSIS — E78.00 PURE HYPERCHOLESTEROLEMIA: ICD-10-CM

## 2024-05-06 DIAGNOSIS — G25.0 BENIGN HEAD TREMOR: ICD-10-CM

## 2024-05-06 DIAGNOSIS — R41.89 COGNITIVE DEFICITS: Primary | ICD-10-CM

## 2024-05-06 DIAGNOSIS — M48.061 SPINAL STENOSIS OF LUMBAR REGION WITHOUT NEUROGENIC CLAUDICATION: ICD-10-CM

## 2024-05-06 DIAGNOSIS — K21.9 GASTROESOPHAGEAL REFLUX DISEASE WITHOUT ESOPHAGITIS: ICD-10-CM

## 2024-05-06 PROBLEM — N20.0 KIDNEY STONE: Status: RESOLVED | Noted: 2017-06-13 | Resolved: 2024-05-06

## 2024-05-06 PROCEDURE — 99215 OFFICE O/P EST HI 40 MIN: CPT | Performed by: NURSE PRACTITIONER

## 2024-05-06 PROCEDURE — 1123F ACP DISCUSS/DSCN MKR DOCD: CPT | Performed by: NURSE PRACTITIONER

## 2024-05-06 SDOH — ECONOMIC STABILITY: FOOD INSECURITY: WITHIN THE PAST 12 MONTHS, YOU WORRIED THAT YOUR FOOD WOULD RUN OUT BEFORE YOU GOT MONEY TO BUY MORE.: NEVER TRUE

## 2024-05-06 SDOH — ECONOMIC STABILITY: INCOME INSECURITY: HOW HARD IS IT FOR YOU TO PAY FOR THE VERY BASICS LIKE FOOD, HOUSING, MEDICAL CARE, AND HEATING?: NOT HARD AT ALL

## 2024-05-06 SDOH — ECONOMIC STABILITY: FOOD INSECURITY: WITHIN THE PAST 12 MONTHS, THE FOOD YOU BOUGHT JUST DIDN'T LAST AND YOU DIDN'T HAVE MONEY TO GET MORE.: NEVER TRUE

## 2024-05-06 ASSESSMENT — ENCOUNTER SYMPTOMS
EYE ITCHING: 0
VOMITING: 0
CONSTIPATION: 0
ABDOMINAL DISTENTION: 0
RHINORRHEA: 0
WHEEZING: 0
EYE DISCHARGE: 0
STRIDOR: 0
EYE PAIN: 0
TROUBLE SWALLOWING: 0
ABDOMINAL PAIN: 0
SHORTNESS OF BREATH: 0
BACK PAIN: 0
CHEST TIGHTNESS: 0
SINUS PAIN: 0
NAUSEA: 0
SINUS PRESSURE: 0
EYE REDNESS: 0
DIARRHEA: 0
COUGH: 0

## 2024-05-06 ASSESSMENT — PATIENT HEALTH QUESTIONNAIRE - PHQ9
1. LITTLE INTEREST OR PLEASURE IN DOING THINGS: NOT AT ALL
4. FEELING TIRED OR HAVING LITTLE ENERGY: NOT AT ALL
2. FEELING DOWN, DEPRESSED OR HOPELESS: NOT AT ALL
SUM OF ALL RESPONSES TO PHQ QUESTIONS 1-9: 0
SUM OF ALL RESPONSES TO PHQ QUESTIONS 1-9: 0
6. FEELING BAD ABOUT YOURSELF - OR THAT YOU ARE A FAILURE OR HAVE LET YOURSELF OR YOUR FAMILY DOWN: NOT AT ALL
8. MOVING OR SPEAKING SO SLOWLY THAT OTHER PEOPLE COULD HAVE NOTICED. OR THE OPPOSITE, BEING SO FIGETY OR RESTLESS THAT YOU HAVE BEEN MOVING AROUND A LOT MORE THAN USUAL: NOT AT ALL
SUM OF ALL RESPONSES TO PHQ9 QUESTIONS 1 & 2: 0
SUM OF ALL RESPONSES TO PHQ QUESTIONS 1-9: 0
9. THOUGHTS THAT YOU WOULD BE BETTER OFF DEAD, OR OF HURTING YOURSELF: NOT AT ALL
7. TROUBLE CONCENTRATING ON THINGS, SUCH AS READING THE NEWSPAPER OR WATCHING TELEVISION: NOT AT ALL
10. IF YOU CHECKED OFF ANY PROBLEMS, HOW DIFFICULT HAVE THESE PROBLEMS MADE IT FOR YOU TO DO YOUR WORK, TAKE CARE OF THINGS AT HOME, OR GET ALONG WITH OTHER PEOPLE: NOT DIFFICULT AT ALL
SUM OF ALL RESPONSES TO PHQ QUESTIONS 1-9: 0
3. TROUBLE FALLING OR STAYING ASLEEP: NOT AT ALL
5. POOR APPETITE OR OVEREATING: NOT AT ALL

## 2024-05-06 NOTE — ASSESSMENT & PLAN NOTE
Chronic stable, may continue to take Tylenol as needed for discomfort.  Patient walks with a cane.

## 2024-05-06 NOTE — PROGRESS NOTES
Jackie Mitchell (:  1940) is a 84 y.o. female,Established patient, here for evaluation of the following chief complaint(s):  Mental Health Problem      ASSESSMENT/PLAN:  1. Cognitive deficits  Assessment & Plan:  Chronic uncontrolled.  MoCA score 18 out of 30, patient had no delayed recall or short-term memory and testing.  At this time she is appropriate for placement in assisted living.  2. Acquired hypothyroidism  Assessment & Plan:     Lab Results   Component Value Date    TSH 0.41 2023    OUC4HHP 3.82 2021     Chronic, stable at this time.  Patient is to continue on levothyroxine 100 mcg daily.  Patient is not having any negative side effects of the medications.  Will need annual checking of her TSH.  Orders:  -     TSH with Reflex to FT4 ([place] Only); Future  3. Pure hypercholesterolemia  Assessment & Plan:  Lab Results   Component Value Date    CHOL 165 2023    TRIG 119 2023    HDL 61 (H) 2023    LDL 80 2023    VLDL 24 2023     Chronic, stable.  Patient to continue with Lofibra daily.  She is receiving benefit of this medication with no negative side effects.    Orders:  -     CBC with Auto Differential; Future  -     Comprehensive Metabolic Panel; Future  4. Gastroesophageal reflux disease without esophagitis  Assessment & Plan:  Chronic stable controlled well with Protonix.  Continue with 40 mg daily.  5. Eczema, unspecified type  Assessment & Plan:  Patient states this is resolved.  6. Benign head tremor  Assessment & Plan:  Chronic stable, no medications.  7. Irritable bowel syndrome with diarrhea  Assessment & Plan:  Chronic stable.  No medications for this.  8. Spinal stenosis of lumbar region without neurogenic claudication  Assessment & Plan:  Chronic stable, may continue to take Tylenol as needed for discomfort.  Patient walks with a cane.  9. Perennial allergic rhinitis  Assessment & Plan:  Chronic, unstable.  Patient to continue with

## 2024-05-06 NOTE — ASSESSMENT & PLAN NOTE
Lab Results   Component Value Date    TSH 0.41 07/19/2023    CRB5AWW 3.82 02/21/2021     Chronic, stable at this time.  Patient is to continue on levothyroxine 100 mcg daily.  Patient is not having any negative side effects of the medications.  Will need annual checking of her TSH.

## 2024-05-06 NOTE — ASSESSMENT & PLAN NOTE
Chronic uncontrolled.  MoCA score 18 out of 30, patient had no delayed recall or short-term memory and testing.  At this time she is appropriate for placement in assisted living.

## 2024-05-06 NOTE — ASSESSMENT & PLAN NOTE
Lab Results   Component Value Date    CHOL 165 07/19/2023    TRIG 119 07/19/2023    HDL 61 (H) 07/19/2023    LDL 80 07/19/2023    VLDL 24 07/19/2023     Chronic, stable.  Patient to continue with Lofibra daily.  She is receiving benefit of this medication with no negative side effects.

## 2024-05-06 NOTE — ASSESSMENT & PLAN NOTE
Chronic, unstable.  Patient to continue with fexofenadine 180 mg daily.  Patient getting adequate relief and side effect from this

## 2024-06-24 RX ORDER — PANTOPRAZOLE SODIUM 40 MG/1
40 TABLET, DELAYED RELEASE ORAL
Qty: 90 TABLET | Refills: 1 | Status: SHIPPED | OUTPATIENT
Start: 2024-06-24

## 2024-09-20 ENCOUNTER — CARE COORDINATION (OUTPATIENT)
Dept: CARE COORDINATION | Age: 84
End: 2024-09-20